# Patient Record
Sex: FEMALE | Race: WHITE | NOT HISPANIC OR LATINO | Employment: OTHER | URBAN - METROPOLITAN AREA
[De-identification: names, ages, dates, MRNs, and addresses within clinical notes are randomized per-mention and may not be internally consistent; named-entity substitution may affect disease eponyms.]

---

## 2017-01-03 ENCOUNTER — ALLSCRIPTS OFFICE VISIT (OUTPATIENT)
Dept: OTHER | Facility: OTHER | Age: 77
End: 2017-01-03

## 2017-06-16 ENCOUNTER — HOSPITAL ENCOUNTER (OUTPATIENT)
Dept: RADIOLOGY | Facility: HOSPITAL | Age: 77
Discharge: HOME | End: 2017-06-16
Payer: MEDICARE

## 2017-06-16 ENCOUNTER — TRANSCRIBE ORDERS (OUTPATIENT)
Dept: RADIOLOGY | Facility: HOSPITAL | Age: 77
End: 2017-06-16

## 2017-06-16 ENCOUNTER — ALLSCRIPTS OFFICE VISIT (OUTPATIENT)
Dept: OTHER | Facility: OTHER | Age: 77
End: 2017-06-16

## 2017-06-16 DIAGNOSIS — R10.32 LLQ ABDOMINAL PAIN: Primary | ICD-10-CM

## 2017-06-16 DIAGNOSIS — R10.32 LLQ ABDOMINAL PAIN: ICD-10-CM

## 2017-06-16 DIAGNOSIS — M54.10 RADICULOPATHY, UNSPECIFIED SPINAL REGION: ICD-10-CM

## 2017-06-16 PROCEDURE — 72110 X-RAY EXAM L-2 SPINE 4/>VWS: CPT

## 2017-06-16 PROCEDURE — 73521 X-RAY EXAM HIPS BI 2 VIEWS: CPT

## 2017-06-20 ENCOUNTER — GENERIC CONVERSION - ENCOUNTER (OUTPATIENT)
Dept: OTHER | Facility: OTHER | Age: 77
End: 2017-06-20

## 2017-06-20 ENCOUNTER — TRANSCRIBE ORDERS (OUTPATIENT)
Dept: ADMINISTRATIVE | Facility: HOSPITAL | Age: 77
End: 2017-06-20

## 2017-06-20 DIAGNOSIS — K80.20 CALCULUS OF GALLBLADDER WITHOUT CHOLECYSTITIS WITHOUT OBSTRUCTION: Primary | ICD-10-CM

## 2017-06-26 ENCOUNTER — APPOINTMENT (OUTPATIENT)
Dept: PHYSICAL THERAPY | Facility: REHABILITATION | Age: 77
End: 2017-06-26
Payer: MEDICARE

## 2017-06-26 PROCEDURE — G8978 MOBILITY CURRENT STATUS: HCPCS

## 2017-06-26 PROCEDURE — 97161 PT EVAL LOW COMPLEX 20 MIN: CPT

## 2017-06-26 PROCEDURE — G8979 MOBILITY GOAL STATUS: HCPCS

## 2017-06-27 ENCOUNTER — GENERIC CONVERSION - ENCOUNTER (OUTPATIENT)
Dept: OTHER | Facility: OTHER | Age: 77
End: 2017-06-27

## 2017-06-28 ENCOUNTER — HOSPITAL ENCOUNTER (OUTPATIENT)
Dept: RADIOLOGY | Facility: HOSPITAL | Age: 77
Discharge: HOME | End: 2017-06-28
Payer: MEDICARE

## 2017-06-28 ENCOUNTER — APPOINTMENT (OUTPATIENT)
Dept: PHYSICAL THERAPY | Facility: REHABILITATION | Age: 77
End: 2017-06-28
Payer: MEDICARE

## 2017-06-28 DIAGNOSIS — K80.20 CALCULUS OF GALLBLADDER WITHOUT CHOLECYSTITIS WITHOUT OBSTRUCTION: ICD-10-CM

## 2017-06-28 PROCEDURE — 97140 MANUAL THERAPY 1/> REGIONS: CPT

## 2017-06-28 PROCEDURE — 97110 THERAPEUTIC EXERCISES: CPT

## 2017-06-28 PROCEDURE — 76705 ECHO EXAM OF ABDOMEN: CPT

## 2017-07-03 ENCOUNTER — GENERIC CONVERSION - ENCOUNTER (OUTPATIENT)
Dept: OTHER | Facility: OTHER | Age: 77
End: 2017-07-03

## 2017-07-05 ENCOUNTER — APPOINTMENT (OUTPATIENT)
Dept: PHYSICAL THERAPY | Facility: REHABILITATION | Age: 77
End: 2017-07-05
Payer: MEDICARE

## 2017-07-05 PROCEDURE — 97110 THERAPEUTIC EXERCISES: CPT

## 2017-07-05 PROCEDURE — 97140 MANUAL THERAPY 1/> REGIONS: CPT

## 2017-07-07 ENCOUNTER — APPOINTMENT (OUTPATIENT)
Dept: PHYSICAL THERAPY | Facility: REHABILITATION | Age: 77
End: 2017-07-07
Payer: MEDICARE

## 2017-07-07 PROCEDURE — 97140 MANUAL THERAPY 1/> REGIONS: CPT

## 2017-07-07 PROCEDURE — 97110 THERAPEUTIC EXERCISES: CPT

## 2017-07-11 ENCOUNTER — APPOINTMENT (OUTPATIENT)
Dept: PHYSICAL THERAPY | Facility: REHABILITATION | Age: 77
End: 2017-07-11
Payer: MEDICARE

## 2017-07-11 PROCEDURE — 97110 THERAPEUTIC EXERCISES: CPT

## 2017-07-11 PROCEDURE — 97140 MANUAL THERAPY 1/> REGIONS: CPT

## 2017-07-14 ENCOUNTER — APPOINTMENT (OUTPATIENT)
Dept: PHYSICAL THERAPY | Facility: REHABILITATION | Age: 77
End: 2017-07-14
Payer: MEDICARE

## 2017-07-14 PROCEDURE — 97110 THERAPEUTIC EXERCISES: CPT

## 2017-07-17 ENCOUNTER — APPOINTMENT (OUTPATIENT)
Dept: PHYSICAL THERAPY | Facility: REHABILITATION | Age: 77
End: 2017-07-17
Payer: MEDICARE

## 2017-07-17 PROCEDURE — 97110 THERAPEUTIC EXERCISES: CPT

## 2017-07-17 PROCEDURE — 97140 MANUAL THERAPY 1/> REGIONS: CPT

## 2017-07-18 ENCOUNTER — ALLSCRIPTS OFFICE VISIT (OUTPATIENT)
Dept: OTHER | Facility: OTHER | Age: 77
End: 2017-07-18

## 2017-07-19 ENCOUNTER — GENERIC CONVERSION - ENCOUNTER (OUTPATIENT)
Dept: OTHER | Facility: OTHER | Age: 77
End: 2017-07-19

## 2017-07-19 ENCOUNTER — APPOINTMENT (OUTPATIENT)
Dept: PHYSICAL THERAPY | Facility: REHABILITATION | Age: 77
End: 2017-07-19
Payer: MEDICARE

## 2017-07-19 ENCOUNTER — TRANSCRIBE ORDERS (OUTPATIENT)
Dept: ADMINISTRATIVE | Facility: HOSPITAL | Age: 77
End: 2017-07-19

## 2017-07-19 DIAGNOSIS — M16.12 PRIMARY OSTEOARTHRITIS OF LEFT HIP: Primary | ICD-10-CM

## 2017-08-03 ENCOUNTER — HOSPITAL ENCOUNTER (OUTPATIENT)
Dept: RADIOLOGY | Facility: HOSPITAL | Age: 77
Discharge: HOME | End: 2017-08-03
Attending: ORTHOPAEDIC SURGERY
Payer: MEDICARE

## 2017-08-03 DIAGNOSIS — M16.12 PRIMARY OSTEOARTHRITIS OF LEFT HIP: ICD-10-CM

## 2017-08-03 PROCEDURE — 77002 NEEDLE LOCALIZATION BY XRAY: CPT

## 2017-08-03 PROCEDURE — 20610 DRAIN/INJ JOINT/BURSA W/O US: CPT

## 2017-08-03 RX ORDER — BUPIVACAINE HYDROCHLORIDE 2.5 MG/ML
30 INJECTION, SOLUTION EPIDURAL; INFILTRATION; INTRACAUDAL
Status: COMPLETED | OUTPATIENT
Start: 2017-08-03 | End: 2017-08-03

## 2017-08-03 RX ORDER — METHYLPREDNISOLONE ACETATE 80 MG/ML
80 INJECTION, SUSPENSION INTRA-ARTICULAR; INTRALESIONAL; INTRAMUSCULAR; SOFT TISSUE
Status: COMPLETED | OUTPATIENT
Start: 2017-08-03 | End: 2017-08-03

## 2017-08-03 RX ORDER — LIDOCAINE HYDROCHLORIDE 10 MG/ML
20 INJECTION, SOLUTION INFILTRATION; PERINEURAL
Status: COMPLETED | OUTPATIENT
Start: 2017-08-03 | End: 2017-08-03

## 2017-08-03 RX ADMIN — BUPIVACAINE HYDROCHLORIDE 2 ML: 2.5 INJECTION, SOLUTION EPIDURAL; INFILTRATION; INTRACAUDAL at 13:35

## 2017-08-03 RX ADMIN — METHYLPREDNISOLONE ACETATE 80 MG: 80 INJECTION, SUSPENSION INTRA-ARTICULAR; INTRALESIONAL; INTRAMUSCULAR; SOFT TISSUE at 13:35

## 2017-08-03 RX ADMIN — IOHEXOL 3 ML: 300 INJECTION, SOLUTION INTRAVENOUS at 13:35

## 2017-08-03 RX ADMIN — LIDOCAINE HYDROCHLORIDE 2 ML: 10 INJECTION, SOLUTION INFILTRATION; PERINEURAL at 13:35

## 2017-10-17 ENCOUNTER — ALLSCRIPTS OFFICE VISIT (OUTPATIENT)
Dept: OTHER | Facility: OTHER | Age: 77
End: 2017-10-17

## 2017-10-17 ENCOUNTER — TRANSCRIBE ORDERS (OUTPATIENT)
Dept: ADMINISTRATIVE | Facility: HOSPITAL | Age: 77
End: 2017-10-17

## 2017-10-17 DIAGNOSIS — M16.12 OSTEOARTHRITIS OF LEFT HIP, UNSPECIFIED OSTEOARTHRITIS TYPE: Primary | ICD-10-CM

## 2017-10-18 NOTE — PROGRESS NOTES
Assessment  1  Lumbar radiculopathy (724 4) (M54 16)   2  Primary osteoarthritis of left hip (715 15) (M16 12)    Plan  Primary osteoarthritis of left hip    · * MRI LUMBAR SPINE WO CONTRAST; Status:Need Information - Financial  Authorization; Requested for:17Oct2017;     Discussion/Summary  Left hip DJD, who symptoms have abated with sterile injections as well as lumbar radiculopathy  Plan is as follows:    Weightbearing as tolerated  MRI of the lumbar/sacral region  We'll contact patient after MRI has been obtained  Discussed treatment plan with patient and they are in agreement with treatment plan  Thank you        History of Present Illness  HPI: 63-year-old female with left hip DJD, who presents for follow-up  Patient had sterile injections to the left hip, which she states greatly help alleviate his symptoms  A shin  States that this, time  Pain involving the anterior aspect of the quadriceps and the knee  She lays down flat  She has significant amount of pain, but when she sits in implant position, the symptoms resolved  Has any numbness, tingling, fevers, chills  Describes pain and also as well as some risk of fall secondary to this pain over the knee region/quadriceps region      Review of Systems    Constitutional: No fever, no chills, feels well, no tiredness, no recent weight gain or loss  Eyes: No complaints of eyesight problems, no red eyes  ENT: no loss of hearing, no nosebleeds, no sore throat  Cardiovascular: No complaints of chest pain, no palpitations, no leg claudication or lower extremity edema  Respiratory: no compliants of shortness of breath, no wheezing, no cough  Gastrointestinal: no complaints of abdominal pain, no constipation, no nausea or diarrhea, no vomiting, no bloody stools  Genitourinary: no complaints of dysuria, no incontinence  Musculoskeletal: as noted in HPI  Integumentary: no complaints of skin rash or lesion, no itching or dry skin, no skin wounds  Neurological: no complaints of headache, no confusion, no numbness or tingling, no dizziness  Endocrine: No complaints of muscle weakness, no feelings of weakness, no frequent urination, no excessive thirst    Psychiatric: No suicidal thoughts, no anxiety, no feelings of depression  Active Problems  1  Acute bronchitis with bronchospasm (466 0) (J20 9)   2  Acute sinusitis (461 9) (J01 90)   3  Cholelithiasis (574 20) (K80 20)   4  Decreased platelet count (620 4) (D69 6)   5  Diverticulosis (562 10) (K57 90)   6  Fatigue (780 79) (R53 83)   7  Fever (780 60) (R50 9)   8  Hematuria (599 70) (R31 9)   9  History of allergy (V15 09) (Z88 9)   10  History of sinusitis (V12 69) (Z87 09)   11  Hyperlipidemia (272 4) (E78 5)   12  Left groin pain (789 09) (R10 32)   13  Left hip pain (719 45) (M25 552)   14  Leukocytes in urine (791 7) (R82 99)   15  Lower back pain (724 2) (M54 5)   16  Lumbar radiculopathy (724 4) (M54 16)   17  Microscopic hematuria (599 72) (R31 29)   18  Primary osteoarthritis of left hip (715 15) (M16 12)   19  Sciatica (724 3) (M54 30)   20  Seborrheic keratosis (702 19) (L82 1)   21  Sinusitis (473 9) (J32 9)   22  Upper respiratory infection (465 9) (J06 9)   23  Urinary frequency (788 41) (R35 0)   24  UTI (urinary tract infection) (599 0) (N39 0)   25  Visit for screening mammogram (V76 12) (Z12 31)    Past Medical History   · History of Shingles (053 9) (B02 9)    The active problems and past medical history were reviewed and updated today  Surgical History   · History of Cataract Surgery   · History of Partial Colectomy - Sigmoid    The surgical history was reviewed and updated today         Family History  Mother    · Family history of Colon Cancer (V16 0)   · Family history of Diverticulitis   · Family history of Hyperlipidemia   · Family history of Lung Cancer (V16 1)  Father    · Family history of Lung Cancer (V16 1)   · Family history of Tuberculosis    The family history was reviewed and updated today  Social History   · Never A Smoker   · Never Drank Alcohol  The social history was reviewed and updated today  Current Meds   1  Claritin 10 MG Oral Tablet; Therapy: [id number] to Recorded    The medication list was reviewed and updated today  Allergies  1  Bactrim TABS   2  Zocor TABS    Vitals   Recorded: 67UNY1993 10:34AM   Heart Rate 72   Respiration 18   Systolic 008   Diastolic 71   Weight 323 lb    BMI Calculated 22 44   BSA Calculated 1 46     Physical Exam  Left hip: No abrasions, no open wounds, slightly positive Stinchfield test, no pain with logrolling or axial loading, positive straight leg raise test over the anterior distal thigh region, no mediolateral joint line tenderness of the knee, stable to coronal and sagittal plane stress of the knee, neurologically and vascular intact  Right hip:No abrasions, no open wounds, no tenderness to palpation, no pain with flexion, internal rotation, and abduction, negative Stinchfield test, no pain with logrolling or axial loading, good anterior tibialis, EHL, FHL function, sensation, intact to superficial and deep peroneal nerves, palpable dorsalis pedis pulse        Results/Data  I personally reviewed the films/images/results in the office today  My interpretation follows  X-ray Review Lumbosacral spine films show syndesmophyte formation on the left side of the L2-3/L3-L4 regions        Signatures   Electronically signed by : LES Verdugo ; Oct 17 2017 11:02AM EST                       (Author)

## 2017-10-25 ENCOUNTER — HOSPITAL ENCOUNTER (OUTPATIENT)
Dept: RADIOLOGY | Facility: HOSPITAL | Age: 77
Discharge: HOME | End: 2017-10-25
Attending: ORTHOPAEDIC SURGERY
Payer: MEDICARE

## 2017-10-25 DIAGNOSIS — M16.12 OSTEOARTHRITIS OF LEFT HIP, UNSPECIFIED OSTEOARTHRITIS TYPE: ICD-10-CM

## 2017-10-25 PROCEDURE — 72148 MRI LUMBAR SPINE W/O DYE: CPT

## 2018-01-11 NOTE — PROGRESS NOTES
Chief Complaint  Pt came in today to drop off a urine sample  Active Problems    1  Acute bronchitis with bronchospasm (466 0) (J20 9)   2  Acute sinusitis (461 9) (J01 90)   3  Decreased platelet count (095 1) (D69 6)   4  Diverticulosis (562 10) (K57 90)   5  Fatigue (780 79) (R53 83)   6  Hematuria (599 70) (R31 9)   7  History of allergy (V15 09) (Z88 9)   8  History of sinusitis (V12 69) (Z87 09)   9  Hyperlipidemia (272 4) (E78 5)   10  Leukocytes in urine (791 7) (R82 99)   11  Lower back pain (724 2) (M54 5)   12  Lumbar radiculopathy (724 4) (M54 16)   13  Microscopic hematuria (599 72) (R31 2)   14  Sciatica (724 3) (M54 30)   15  Seborrheic keratosis (702 19) (L82 1)   16  Upper respiratory infection (465 9) (J06 9)   17  UTI (urinary tract infection) (599 0) (N39 0)   18  Visit for screening mammogram (V76 12) (Z12 31)    Current Meds   1  Claritin 10 MG Oral Tablet; Therapy: [id number] to Recorded   2  Ipratropium Bromide 0 06 % Nasal Solution; USE 2 SPRAYS IN EACH NOSTRIL 3   TIMES DAILY; Therapy: 12FRB7004 to ([id number]); Last Rx:[id number] Ordered   3  Nitrofurantoin Monohyd Macro 100 MG Oral Capsule; TAKE 1 CAPSULE TWICE DAILY   UNTIL GONE;   Therapy: [id number] to (Evaluate:05Jun2016)  Requested for: 33DJZ8186; Last   Rx:[id number] Ordered    Allergies    1  Bactrim TABS   2  Zocor TABS    Plan   Urine Dip Non-Automated- POC; Status:Resulted - Requires Verification;   Done: 54KZP7133 12:00AM  Due:14Jun2017; Last Updated Shreyas Mcfarlane; 6/14/2016 8:23:26 PM;Ordered;    Yosef Banana; Ordered By:Andrew Gooden;      Discussion/Summary    No microscopic blood noted in urine        Signatures   Electronically signed by : Kaushik Rush MD; Ed 15 2016  8:19AM EST                       (Author)

## 2018-01-12 NOTE — RESULT NOTES
Verified Results  US ABDOMEN LIMITED [id number] 08:12AM Savita Herr     Test Name Result Flag Reference   US ABDOMEN LIMITED (Report)     RIGHT UPPER QUADRANT ULTRASOUND     INDICATION: Cholelithiasis detected on recent lumbar spine x-rays  COMPARISON: Lumbar spine x-ray 6/16/2017, CT study 2009     TECHNIQUE:  Real-time ultrasound of the right upper quadrant was performed with a curvilinear transducer with both volumetric sweeps and still imaging techniques  FINDINGS:     PANCREAS: Visualized portions of the pancreas are within normal limits  AORTA AND IVC: Visualized portions are normal for patient age  LIVER:   Size: Within normal range  The liver measures 13 3 cm in the midclavicular line  Contour: Surface contour is smooth  Parenchyma: Echogenicity and echotexture are within normal limits  There is a hyperechoic nodule within the hepatic parenchyma in the upper portion of the subcapsular right hepatic lobe  Limited imaging of the main portal vein shows it to be patent and hepatopetal       BILIARY:   The gallbladder is normal in caliber  No wall thickening or pericholecystic fluid  Multiple gallstones are present   No sonographic Cabello's sign  No intrahepatic biliary dilatation  CBD measures 4 mm  No choledocholithiasis  KIDNEY:    Right kidney measures 10 7 x 4 3 cm  Within normal limits  ASCITES:  None  IMPRESSION:       1  Multiple gallstones, without gallbladder wall thickening or biliary ductal dilatation  2  The liver is unremarkable with the exception of a hyperechoic nodule in the right hepatic lobe measuring 7 mm in diameter  This is almost certainly benign in a patient without a history of primary malignancy, frequently representing a small    hemangioma or lipoma   There is a similar sized nodule in similar location on a CT study as far back as 2007 again suggesting benign etiology       Workstation performed: [id number]     Signed by:   Leonela Burton Dee Wood MD   7/2/17

## 2018-01-13 VITALS
DIASTOLIC BLOOD PRESSURE: 70 MMHG | TEMPERATURE: 100.4 F | WEIGHT: 119 LBS | SYSTOLIC BLOOD PRESSURE: 132 MMHG | OXYGEN SATURATION: 98 % | BODY MASS INDEX: 23.36 KG/M2 | HEART RATE: 90 BPM | HEIGHT: 60 IN

## 2018-01-13 NOTE — RESULT NOTES
Verified Results  * XR SPINE LUMBAR MINIMUM 4 VIEWS NON INJURY [id number] 01:30PM Savita Herr     Test Name Result Flag Reference   XR SPINE LUMBAR MINIMUM 4 VIEWS (Report)     LUMBAR SPINE     INDICATION: 2 weeks of left groin pain  COMPARISON: 2/3/2013     VIEWS: AP, lateral, bilateral oblique and coned down projections     IMAGES: 5     FINDINGS:     Stable alignment  No spondylolisthesis or spondylolysis  Mild dextroscoliosis of the lower lumbar spine  There is no radiographic evidence of acute fracture or destructive osseous lesion  Mild facet arthropathy within the lower lumbar spine  At L2-3 there is loss of disc height with anterior and lateral endplate osteophyte formation slightly more pronounced than the prior exam      There are numerous tiny peripherally calcified gallstones present in the right upper quadrant  Multiple phleboliths within the pelvis  IMPRESSION:     Slight progression of lumbar degenerative change most pronounced at L2-3 with worsening loss of disc height and endplate osteophyte formation  Stable mild facet arthropathy within the lower lumbar spine  Cholelithiasis  Workstation performed: [id number]     Signed by:   Braydon Kunz DO   6/20/17     * XR HIPS BILATERAL 2 VW W PELVIS IF PERFORMED [id number] 01:30PM Savita Herr     Test Name Result Flag Reference   XR HIPS BILATERAL WITH AP PELVIS (Report)     BILATERAL HIPS AND PELVIS     INDICATION: Left groin pain for 2 weeks  COMPARISON: None     VIEWS: AP pelvis and coned down views of each hip     IMAGES: 5      FINDINGS:     No acute pelvic fracture or pathologic bone lesions  Visualized bony pelvis appears intact  LEFT HIP:   Moderate degenerative osteoarthritis of the left hip  Bony alignment is maintained  Soft tissues are unremarkable  RIGHT HIP:   Mild degenerative osteoarthritis of the right hip  Bony alignment is maintained  Soft tissues are unremarkable  IMPRESSION:     Degenerative osteoarthritis of the hips, left greater than right         Workstation performed: [id number]     Signed by:   Diann Kingsley DO   6/20/17

## 2018-01-14 VITALS
BODY MASS INDEX: 22.09 KG/M2 | HEIGHT: 60 IN | TEMPERATURE: 99 F | SYSTOLIC BLOOD PRESSURE: 112 MMHG | WEIGHT: 112.5 LBS | HEART RATE: 64 BPM | DIASTOLIC BLOOD PRESSURE: 72 MMHG | RESPIRATION RATE: 16 BRPM

## 2018-01-14 VITALS
DIASTOLIC BLOOD PRESSURE: 81 MMHG | HEART RATE: 65 BPM | WEIGHT: 111.25 LBS | SYSTOLIC BLOOD PRESSURE: 133 MMHG | HEIGHT: 60 IN | BODY MASS INDEX: 21.84 KG/M2 | RESPIRATION RATE: 18 BRPM

## 2018-01-14 VITALS
RESPIRATION RATE: 18 BRPM | DIASTOLIC BLOOD PRESSURE: 71 MMHG | WEIGHT: 113 LBS | HEART RATE: 72 BPM | SYSTOLIC BLOOD PRESSURE: 147 MMHG | BODY MASS INDEX: 22.44 KG/M2

## 2018-01-14 NOTE — RESULT NOTES
Verified Results  (1) URINALYSIS w URINE C/S REFLEX (will reflex a microscopy if leukocytes, occult blood, or nitrites are not within normal limits) [id number] 08:45PM Savita Herr     Test Name Result Flag Reference   COLOR Dk Yellow     CLARITY Turbid     PH UA 5 0  4 5-8 0   LEUKOCYTE ESTERASE UA Large A Negative   NITRITE UA Negative  Negative   PROTEIN  (2+) mg/dl A Negative   GLUCOSE UA Negative mg/dl  Negative   KETONES UA Negative mg/dl  Negative   UROBILINOGEN UA 0 2 E U /dl  0 2, 1 0 E U /dl   BILIRUBIN UA  A Negative   Interference- unable to analyze   The dipstick result may be falsely positive do to interfering substances  We recommend reliance upon serum bilirubin, liver & kidney function tests to guide patient care if clinically indicated     BLOOD UA Large A Negative   SPECIFIC GRAVITY UA 1 023  1 003-1 030     (1) URINALYSIS w URINE C/S REFLEX (will reflex a microscopy if leukocytes, occult blood, or nitrites are not within normal limits) [id number] 08:45PM Nemesio Savita     Test Name Result Flag Reference   AMORPHOUS URATES Innumerable /hpf     BACTERIA Occasional /hpf  None Seen, Occasional   EPITHELIAL CELLS None Seen /hpf  None Seen, Occasional   RBC UA 10-20 /hpf A None Seen   WBC UA Innumerable /hpf A None Seen

## 2018-01-18 NOTE — RESULT NOTES
Verified Results  (1) URINALYSIS w URINE C/S REFLEX (will reflex a microscopy if leukocytes, occult blood, or nitrites are not within normal limits) [id number] 08:45PM Savita Herr     Test Name Result Flag Reference   CLINICAL REPORT (Report)     Test:        Urine culture  Specimen Type:   Urine  Specimen Date:   5/31/2016 8:45 PM  Result Date:    6/3/2016 10:27 AM  Result Status:   Final result  Resulting Lab:   [facility] [place] [address]            Tel: [phone]      CULTURE                                       ------------------                                   40,000-49,000 cfu/ml Klebsiella pneumoniae      SUSCEPTIBILITY                                   ------------------                                                       Klebsiella pneumoniae  METHOD                 LORENA  -------------------------------------  --------------------------  AMPICILLIN ($$)             >16 00 ug/ml Resistant  AMPICILLIN + SULBACTAM ($)       <=8/4 ug/ml  Susceptible  AZTREONAM ($$$)             <=8 ug/ml   Susceptible  CEFAZOLIN ($)              <=8 00 ug/ml Susceptible  CIPROFLOXACIN ($)            <=1 00 ug/ml Susceptible  GENTAMICIN ($$)             <=4 ug/ml   Susceptible  LEVOFLOXACIN ($)            <=2 00 ug/ml Susceptible  NITROFURANTOIN             64 ug/ml   Intermediate  PIPERACILLIN + TAZOBACTAM ($$$)     <=16 ug/ml  Susceptible  TETRACYCLINE              >8 ug/ml   Resistant  TOBRAMYCIN ($)             <=4 ug/ml   Susceptible  TRIMETHOPRIM + SULFAMETHOXAZOLE ($$$)  <=2/38 ug/ml Susceptible

## 2019-03-26 ENCOUNTER — OFFICE VISIT (OUTPATIENT)
Dept: FAMILY MEDICINE CLINIC | Facility: CLINIC | Age: 79
End: 2019-03-26
Payer: MEDICARE

## 2019-03-26 VITALS
HEIGHT: 60 IN | HEART RATE: 80 BPM | BODY MASS INDEX: 21.28 KG/M2 | DIASTOLIC BLOOD PRESSURE: 82 MMHG | RESPIRATION RATE: 16 BRPM | WEIGHT: 108.4 LBS | TEMPERATURE: 98.4 F | SYSTOLIC BLOOD PRESSURE: 140 MMHG

## 2019-03-26 DIAGNOSIS — E78.00 ELEVATED CHOLESTEROL: ICD-10-CM

## 2019-03-26 DIAGNOSIS — R20.0 NUMBNESS: ICD-10-CM

## 2019-03-26 DIAGNOSIS — J32.9 SINUSITIS, UNSPECIFIED CHRONICITY, UNSPECIFIED LOCATION: Primary | ICD-10-CM

## 2019-03-26 DIAGNOSIS — R10.32 LEFT GROIN PAIN: ICD-10-CM

## 2019-03-26 DIAGNOSIS — M81.0 OSTEOPOROSIS, UNSPECIFIED OSTEOPOROSIS TYPE, UNSPECIFIED PATHOLOGICAL FRACTURE PRESENCE: ICD-10-CM

## 2019-03-26 DIAGNOSIS — R53.83 FATIGUE, UNSPECIFIED TYPE: ICD-10-CM

## 2019-03-26 PROCEDURE — 99214 OFFICE O/P EST MOD 30 MIN: CPT | Performed by: FAMILY MEDICINE

## 2019-03-26 RX ORDER — AMOXICILLIN 500 MG/1
500 CAPSULE ORAL EVERY 12 HOURS SCHEDULED
Qty: 14 CAPSULE | Refills: 0 | Status: SHIPPED | OUTPATIENT
Start: 2019-03-26 | End: 2019-04-02

## 2019-03-26 RX ORDER — LORATADINE 10 MG/1
TABLET ORAL
COMMUNITY
Start: 2014-10-08

## 2019-03-26 NOTE — PROGRESS NOTES
FAMILY PRACTICE OFFICE VISIT       NAME: Alexandro Jasso  AGE: 66 y o  SEX: female       : 1940        MRN: [id number]    DATE: 3/28/2019  TIME: 8:09 PM    Assessment and Plan     Problem List Items Addressed This Visit        Respiratory    Sinusitis - Primary    Relevant Medications    amoxicillin (AMOXIL) 500 mg capsule       Musculoskeletal and Integument    Osteoporosis    Relevant Orders    DXA bone density spine hip and pelvis       Other    Left groin pain    Relevant Orders    Ambulatory referral to Orthopedic Surgery    Numbness    Relevant Orders    TSH, 3rd generation with Free T4 reflex    Vitamin B12    Ambulatory referral to Orthopedic Surgery    Elevated cholesterol    Relevant Orders    Comprehensive metabolic panel    Lipid Panel with Direct LDL reflex      Other Visit Diagnoses     Fatigue, unspecified type        Relevant Orders    CBC and differential    Comprehensive metabolic panel    TSH, 3rd generation with Free T4 reflex        Sinusitis:  Patient presents with symptoms that appear consistent with sinusitis  Has been exposed to sick contacts  Will start patient on amoxicillin  Recommend nasal saline rinses  Continue with symptomatic treatment and supportive measures including adequate hydration  Return parameters discussed  History of left groin pain and right heel numbness:  Patient does not wish to have any further workup  She would like to 1st be evaluated Dr Pritchard  Referral provided  Denies bowel or bladder incontinence  History of elevated cholesterol: Will check lipid panel  Continue lifestyle modifications  History of osteoporosis:  Will check DEXA scan  Recommend muscle strengthening, weight-bearing exercises as well as calcium and vitamin-D supplementation  There are no Patient Instructions on file for this visit          Chief Complaint     Chief Complaint   Patient presents with    Cold Like Symptoms     Pt is here for headaches, sinus pressure, chills 5 + days       History of Present Illness     HPI  70-year-old female presents with a 5 day h/o sinus pressure, nasal congestion, chills, nasal drainage     has taken sudafed pe for 3 days, last taken this am at 6 30    has been sick   drinking enough water      Right heel numbness, notices it when she takes a bath  Would like to see dr Michelle Ramires first   does not want any testing at present    Review of Systems   Review of Systems   Constitutional: Positive for chills  HENT: Positive for congestion, rhinorrhea and sinus pressure  Musculoskeletal:        Right heel numbness   Neurological: Positive for headaches  Active Problem List     Patient Active Problem List   Diagnosis    Sinusitis    Left groin pain    Numbness    Elevated cholesterol    Osteoporosis       Past Medical History:  History reviewed  No pertinent past medical history  Past Surgical History:  History reviewed  No pertinent surgical history  Family History:  History reviewed  No pertinent family history      Social History:  Social History     Socioeconomic History    Marital status: /Civil Union     Spouse name: Not on file    Number of children: Not on file    Years of education: Not on file    Highest education level: Not on file   Occupational History    Not on file   Social Needs    Financial resource strain: Not on file    Food insecurity:     Worry: Not on file     Inability: Not on file    Transportation needs:     Medical: Not on file     Non-medical: Not on file   Tobacco Use    Smoking status: Never Smoker    Smokeless tobacco: Never Used   Substance and Sexual Activity    Alcohol use: Yes     Comment: occasional    Drug use: Never    Sexual activity: Not on file   Lifestyle    Physical activity:     Days per week: Not on file     Minutes per session: Not on file    Stress: Not on file   Relationships    Social connections:     Talks on phone: Not on file     Gets together: Not on file     Attends Gnosticism service: Not on file     Active member of club or organization: Not on file     Attends meetings of clubs or organizations: Not on file     Relationship status: Not on file    Intimate partner violence:     Fear of current or ex partner: Not on file     Emotionally abused: Not on file     Physically abused: Not on file     Forced sexual activity: Not on file   Other Topics Concern    Not on file   Social History Narrative    Not on file     I have reviewed the patient's medical history in detail; there are no changes to the history as noted in the electronic medical record  Objective     Vitals:    03/26/19 1506   BP: 140/82   Pulse:    Resp:    Temp:      Wt Readings from Last 3 Encounters:   03/26/19 49 2 kg (108 lb 6 4 oz)   10/25/17 51 3 kg (113 lb)   10/17/17 51 3 kg (113 lb)     Vitals:    03/26/19 1414 03/26/19 1506   BP: 162/90 140/82   Pulse: 80    Resp: 16    Temp: 98 4 °F (36 9 °C)    TempSrc: Tympanic    Weight: 49 2 kg (108 lb 6 4 oz)    Height: 5' (1 524 m)          Physical Exam   Constitutional: She appears well-developed and well-nourished  HENT:   Head: Normocephalic and atraumatic  Mouth/Throat: Oropharynx is clear and moist    Mild left ear effusion  TMs intact and clear  Nares with edema noted  Posterior oropharynx with drainage erythema noted  Tender palpation of maxillary, ethmoid and frontal sinuses bilaterally  Eyes: Pupils are equal, round, and reactive to light  Conjunctivae and EOM are normal    Neck: Normal range of motion  Neck supple  No thyromegaly present  Cardiovascular: Normal rate, regular rhythm and normal heart sounds  Pulmonary/Chest: Effort normal and breath sounds normal  She has no wheezes  Musculoskeletal: Normal range of motion  Lymphadenopathy:     She has no cervical adenopathy  Nursing note and vitals reviewed        Pertinent Laboratory/Diagnostic Studies:  Lab Results   Component Value Date    GLUCOSE 98 10/09/2014 BUN 17 10/09/2014    CREATININE 0 58 (L) 10/09/2014    CALCIUM 9 9 10/09/2014     10/09/2014    K 4 0 10/09/2014    CO2 29 10/09/2014     10/09/2014     Lab Results   Component Value Date    ALT 31 10/09/2014    AST 26 10/09/2014    ALKPHOS 106 10/09/2014    BILITOT 0 80 10/09/2014       Lab Results   Component Value Date    WBC 5 53 10/09/2014    HGB 14 6 10/09/2014    HCT 44 0 10/09/2014    MCV 92 10/09/2014     (L) 10/09/2014       No results found for: TSH    Lab Results   Component Value Date    CHOL 264 10/09/2014     Lab Results   Component Value Date    TRIG 183 10/09/2014     Lab Results   Component Value Date    HDL 55 10/09/2014     Lab Results   Component Value Date    LDLCALC 172 (H) 10/09/2014     No results found for: HGBA1C    Results for orders placed or performed in visit on 12/03/16   Urine culture   Result Value Ref Range    Urine Culture >100,000 cfu/ml Escherichia coli        Susceptibility    Escherichia coli - LORENA     Ampicillin ($$) <=8 00 Susceptible ug/ml     Aztreonam ($$$)  <=8 Susceptible ug/ml     Cefazolin ($) <=8 00 Susceptible ug/ml     Ciprofloxacin ($)  <=1 00 Susceptible ug/ml     Gentamicin ($$) <=4 Susceptible ug/ml     Levofloxacin ($) <=2 00 Susceptible ug/ml     Nitrofurantoin <=32 Susceptible ug/ml     Piperacillin + Tazobactam ($$$) <=16 Susceptible ug/ml     Tetracycline <=4 Susceptible ug/ml     Tobramycin ($) <=4 Susceptible ug/ml     Trimethoprim + Sulfamethoxazole ($$$) <=2/38 Susceptible ug/ml   Urinalysis with culture and sensitivity reflex   Result Value Ref Range    Color, UA Yellow     Clarity, UA Cloudy     Specific [place], UA 1 011 1 003 - 1 030    pH, UA 6 0 4 5 - 8 0    Leukocytes, UA Large (A) Negative    Nitrite, UA Negative Negative    Protein, UA 30 (1+) (A) Negative mg/dl    Glucose, UA Negative Negative mg/dl    Ketones, UA Negative Negative mg/dl    Urobilinogen, UA 0 2 0 2, 1 0 E U /dl E U /dl    Bilirubin, UA Negative Negative Blood, UA Large (A) Negative   Urine Microscopic   Result Value Ref Range    RBC, UA 30-50 (A) None Seen /hpf    WBC, UA Innumerable (A) None Seen /hpf    Epithelial Cells None Seen None Seen, Occasional /hpf    Bacteria, UA Occasional None Seen, Occasional /hpf       Orders Placed This Encounter   Procedures    DXA bone density spine hip and pelvis    CBC and differential    Comprehensive metabolic panel    Lipid Panel with Direct LDL reflex    TSH, 3rd generation with Free T4 reflex    Vitamin B12    Ambulatory referral to Orthopedic Surgery       ALLERGIES:  Allergies   Allergen Reactions    Bactrim [Sulfamethoxazole-Trimethoprim]     Simvastatin Myalgia       Current Medications     Current Outpatient Medications   Medication Sig Dispense Refill    loratadine (CLARITIN) 10 mg tablet Take by mouth      amoxicillin (AMOXIL) 500 mg capsule Take 1 capsule (500 mg total) by mouth every 12 (twelve) hours for 7 days 14 capsule 0     No current facility-administered medications for this visit            Health Maintenance     Health Maintenance   Topic Date Due    Depression Screening PHQ  1940    Medicare Annual Wellness Visit (AWV)  1940    DTaP,Tdap,and Td Vaccines (1 - Tdap) 09/22/1961    Fall Risk  09/22/2005    Urinary Incontinence Screening  09/22/2005    Pneumococcal PPSV23/PCV13 65+ Years / High and Highest Risk (2 of 2 - PCV13) 10/10/2009    BMI: Adult  03/26/2020    INFLUENZA VACCINE  Completed    HEPATITIS B VACCINES  Aged Out     Immunization History   Administered Date(s) Administered    INFLUENZA 09/27/2013, 11/11/2014, 10/28/2015, 10/05/2016, 10/23/2017, 10/17/2018    Influenza TIV (IM) 09/01/2009    Pneumococcal Polysaccharide PPV23 10/10/2008       Goyo Saha MD

## 2019-03-28 PROBLEM — J32.9 SINUSITIS: Status: ACTIVE | Noted: 2019-03-28

## 2019-03-28 PROBLEM — R10.32 LEFT GROIN PAIN: Status: ACTIVE | Noted: 2019-03-28

## 2019-03-28 PROBLEM — E78.00 ELEVATED CHOLESTEROL: Status: ACTIVE | Noted: 2019-03-28

## 2019-03-28 PROBLEM — M81.0 OSTEOPOROSIS: Status: ACTIVE | Noted: 2019-03-28

## 2019-03-28 PROBLEM — R20.0 NUMBNESS: Status: ACTIVE | Noted: 2019-03-28

## 2019-04-20 ENCOUNTER — TRANSCRIBE ORDERS (OUTPATIENT)
Dept: LAB | Facility: CLINIC | Age: 79
End: 2019-04-20

## 2019-04-20 ENCOUNTER — LAB (OUTPATIENT)
Dept: LAB | Facility: CLINIC | Age: 79
End: 2019-04-20
Payer: MEDICARE

## 2019-04-20 DIAGNOSIS — R53.83 FATIGUE, UNSPECIFIED TYPE: ICD-10-CM

## 2019-04-20 DIAGNOSIS — R20.0 NUMBNESS: ICD-10-CM

## 2019-04-20 DIAGNOSIS — E78.00 ELEVATED CHOLESTEROL: ICD-10-CM

## 2019-04-20 LAB
ALBUMIN SERPL BCP-MCNC: 3.9 G/DL (ref 3.5–5)
ALP SERPL-CCNC: 89 U/L (ref 46–116)
ALT SERPL W P-5'-P-CCNC: 18 U/L (ref 12–78)
ANION GAP SERPL CALCULATED.3IONS-SCNC: 5 MMOL/L (ref 4–13)
AST SERPL W P-5'-P-CCNC: 16 U/L (ref 5–45)
BASOPHILS # BLD AUTO: 0.07 THOUSANDS/ΜL (ref 0–0.1)
BASOPHILS NFR BLD AUTO: 1 % (ref 0–1)
BILIRUB SERPL-MCNC: 0.68 MG/DL (ref 0.2–1)
BUN SERPL-MCNC: 17 MG/DL (ref 5–25)
CALCIUM SERPL-MCNC: 9.1 MG/DL (ref 8.3–10.1)
CHLORIDE SERPL-SCNC: 109 MMOL/L (ref 100–108)
CHOLEST SERPL-MCNC: 249 MG/DL (ref 50–200)
CO2 SERPL-SCNC: 28 MMOL/L (ref 21–32)
CREAT SERPL-MCNC: 0.59 MG/DL (ref 0.6–1.3)
EOSINOPHIL # BLD AUTO: 0.23 THOUSAND/ΜL (ref 0–0.61)
EOSINOPHIL NFR BLD AUTO: 4 % (ref 0–6)
ERYTHROCYTE [DISTWIDTH] IN BLOOD BY AUTOMATED COUNT: 13 % (ref 11.6–15.1)
GFR SERPL CREATININE-BSD FRML MDRD: 88 ML/MIN/1.73SQ M
GLUCOSE P FAST SERPL-MCNC: 84 MG/DL (ref 65–99)
HCT VFR BLD AUTO: 44.8 % (ref 34.8–46.1)
HDLC SERPL-MCNC: 54 MG/DL (ref 40–60)
HGB BLD-MCNC: 14.5 G/DL (ref 11.5–15.4)
IMM GRANULOCYTES # BLD AUTO: 0.03 THOUSAND/UL (ref 0–0.2)
IMM GRANULOCYTES NFR BLD AUTO: 1 % (ref 0–2)
LDLC SERPL CALC-MCNC: 170 MG/DL (ref 0–100)
LYMPHOCYTES # BLD AUTO: 0.95 THOUSANDS/ΜL (ref 0.6–4.47)
LYMPHOCYTES NFR BLD AUTO: 16 % (ref 14–44)
MCH RBC QN AUTO: 30.2 PG (ref 26.8–34.3)
MCHC RBC AUTO-ENTMCNC: 32.4 G/DL (ref 31.4–37.4)
MCV RBC AUTO: 93 FL (ref 82–98)
MONOCYTES # BLD AUTO: 0.74 THOUSAND/ΜL (ref 0.17–1.22)
MONOCYTES NFR BLD AUTO: 13 % (ref 4–12)
NEUTROPHILS # BLD AUTO: 3.87 THOUSANDS/ΜL (ref 1.85–7.62)
NEUTS SEG NFR BLD AUTO: 65 % (ref 43–75)
NRBC BLD AUTO-RTO: 0 /100 WBCS
PLATELET # BLD AUTO: 147 THOUSANDS/UL (ref 149–390)
PMV BLD AUTO: 10.4 FL (ref 8.9–12.7)
POTASSIUM SERPL-SCNC: 3.7 MMOL/L (ref 3.5–5.3)
PROT SERPL-MCNC: 6.7 G/DL (ref 6.4–8.2)
RBC # BLD AUTO: 4.8 MILLION/UL (ref 3.81–5.12)
SODIUM SERPL-SCNC: 142 MMOL/L (ref 136–145)
T4 FREE SERPL-MCNC: 0.81 NG/DL (ref 0.76–1.46)
TRIGL SERPL-MCNC: 124 MG/DL
TSH SERPL DL<=0.05 MIU/L-ACNC: 4.81 UIU/ML (ref 0.36–3.74)
VIT B12 SERPL-MCNC: 522 PG/ML (ref 100–900)
WBC # BLD AUTO: 5.89 THOUSAND/UL (ref 4.31–10.16)

## 2019-04-20 PROCEDURE — 85025 COMPLETE CBC W/AUTO DIFF WBC: CPT

## 2019-04-20 PROCEDURE — 82607 VITAMIN B-12: CPT

## 2019-04-20 PROCEDURE — 84439 ASSAY OF FREE THYROXINE: CPT

## 2019-04-20 PROCEDURE — 80053 COMPREHEN METABOLIC PANEL: CPT

## 2019-04-20 PROCEDURE — 36415 COLL VENOUS BLD VENIPUNCTURE: CPT

## 2019-04-20 PROCEDURE — 84443 ASSAY THYROID STIM HORMONE: CPT

## 2019-04-20 PROCEDURE — 80061 LIPID PANEL: CPT

## 2019-04-21 DIAGNOSIS — E78.00 ELEVATED CHOLESTEROL: Primary | ICD-10-CM

## 2019-04-21 DIAGNOSIS — R79.89 ELEVATED TSH: ICD-10-CM

## 2019-04-22 ENCOUNTER — TELEPHONE (OUTPATIENT)
Dept: FAMILY MEDICINE CLINIC | Facility: CLINIC | Age: 79
End: 2019-04-22

## 2019-04-30 ENCOUNTER — CONSULT (OUTPATIENT)
Dept: OBGYN CLINIC | Facility: HOSPITAL | Age: 79
End: 2019-04-30
Payer: MEDICARE

## 2019-04-30 ENCOUNTER — HOSPITAL ENCOUNTER (OUTPATIENT)
Dept: RADIOLOGY | Facility: HOSPITAL | Age: 79
Discharge: HOME | End: 2019-04-30
Attending: ORTHOPAEDIC SURGERY
Payer: MEDICARE

## 2019-04-30 VITALS
BODY MASS INDEX: 21.01 KG/M2 | DIASTOLIC BLOOD PRESSURE: 80 MMHG | SYSTOLIC BLOOD PRESSURE: 143 MMHG | WEIGHT: 107 LBS | HEART RATE: 69 BPM | HEIGHT: 60 IN

## 2019-04-30 DIAGNOSIS — M16.12 PRIMARY OSTEOARTHRITIS OF ONE HIP, LEFT: ICD-10-CM

## 2019-04-30 DIAGNOSIS — M25.552 LEFT HIP PAIN: ICD-10-CM

## 2019-04-30 DIAGNOSIS — R20.0 NUMBNESS: ICD-10-CM

## 2019-04-30 DIAGNOSIS — R10.32 LEFT GROIN PAIN: ICD-10-CM

## 2019-04-30 DIAGNOSIS — M16.12 PRIMARY OSTEOARTHRITIS OF ONE HIP, LEFT: Primary | ICD-10-CM

## 2019-04-30 DIAGNOSIS — M54.16 RADICULOPATHY, LUMBAR REGION: ICD-10-CM

## 2019-04-30 PROCEDURE — 73502 X-RAY EXAM HIP UNI 2-3 VIEWS: CPT

## 2019-04-30 PROCEDURE — 99214 OFFICE O/P EST MOD 30 MIN: CPT | Performed by: ORTHOPAEDIC SURGERY

## 2019-05-03 ENCOUNTER — HOSPITAL ENCOUNTER (OUTPATIENT)
Dept: RADIOLOGY | Facility: HOSPITAL | Age: 79
Discharge: HOME | End: 2019-05-03
Payer: MEDICARE

## 2019-05-03 DIAGNOSIS — R20.0 NUMBNESS: ICD-10-CM

## 2019-05-03 PROCEDURE — 72148 MRI LUMBAR SPINE W/O DYE: CPT

## 2019-05-14 ENCOUNTER — HOSPITAL ENCOUNTER (OUTPATIENT)
Dept: RADIOLOGY | Facility: HOSPITAL | Age: 79
Discharge: HOME | End: 2019-05-14
Attending: ORTHOPAEDIC SURGERY
Payer: MEDICARE

## 2019-05-14 ENCOUNTER — HOSPITAL ENCOUNTER (OUTPATIENT)
Dept: RADIOLOGY | Facility: HOSPITAL | Age: 79
Discharge: HOME | End: 2019-05-14
Payer: MEDICARE

## 2019-05-14 DIAGNOSIS — M16.12 PRIMARY OSTEOARTHRITIS OF ONE HIP, LEFT: ICD-10-CM

## 2019-05-14 DIAGNOSIS — R10.32 LEFT GROIN PAIN: ICD-10-CM

## 2019-05-14 PROCEDURE — 77002 NEEDLE LOCALIZATION BY XRAY: CPT

## 2019-05-14 PROCEDURE — 20610 DRAIN/INJ JOINT/BURSA W/O US: CPT

## 2019-05-14 RX ORDER — BUPIVACAINE HYDROCHLORIDE 2.5 MG/ML
10 INJECTION, SOLUTION EPIDURAL; INFILTRATION; INTRACAUDAL
Status: DISCONTINUED | OUTPATIENT
Start: 2019-05-14 | End: 2019-05-15 | Stop reason: HOSPADM

## 2019-05-14 RX ORDER — LIDOCAINE HYDROCHLORIDE 10 MG/ML
20 INJECTION, SOLUTION INFILTRATION; PERINEURAL
Status: DISCONTINUED | OUTPATIENT
Start: 2019-05-14 | End: 2019-05-15 | Stop reason: HOSPADM

## 2019-05-14 RX ORDER — METHYLPREDNISOLONE ACETATE 80 MG/ML
80 INJECTION, SUSPENSION INTRA-ARTICULAR; INTRALESIONAL; INTRAMUSCULAR; SOFT TISSUE
Status: DISCONTINUED | OUTPATIENT
Start: 2019-05-14 | End: 2019-05-15 | Stop reason: HOSPADM

## 2019-05-14 RX ADMIN — IOHEXOL 3 ML: 300 INJECTION, SOLUTION INTRAVENOUS at 14:00

## 2019-05-20 ENCOUNTER — CLINICAL SUPPORT (OUTPATIENT)
Dept: PAIN MEDICINE | Facility: CLINIC | Age: 79
End: 2019-05-20
Payer: MEDICARE

## 2019-05-20 VITALS
HEIGHT: 60 IN | HEART RATE: 72 BPM | SYSTOLIC BLOOD PRESSURE: 143 MMHG | BODY MASS INDEX: 20.22 KG/M2 | WEIGHT: 103 LBS | DIASTOLIC BLOOD PRESSURE: 69 MMHG | TEMPERATURE: 98.1 F

## 2019-05-20 DIAGNOSIS — M54.16 RADICULOPATHY, LUMBAR REGION: Primary | ICD-10-CM

## 2019-05-20 DIAGNOSIS — M51.26 LUMBAR DISC HERNIATION: ICD-10-CM

## 2019-05-20 DIAGNOSIS — M47.816 LUMBAR SPONDYLOSIS: ICD-10-CM

## 2019-05-20 PROCEDURE — 99204 OFFICE O/P NEW MOD 45 MIN: CPT | Performed by: ANESTHESIOLOGY

## 2019-06-12 ENCOUNTER — HOSPITAL ENCOUNTER (OUTPATIENT)
Dept: RADIOLOGY | Facility: CLINIC | Age: 79
Discharge: HOME | End: 2019-06-12
Attending: ANESTHESIOLOGY | Admitting: ANESTHESIOLOGY
Payer: MEDICARE

## 2019-06-12 VITALS
SYSTOLIC BLOOD PRESSURE: 136 MMHG | TEMPERATURE: 98 F | DIASTOLIC BLOOD PRESSURE: 76 MMHG | RESPIRATION RATE: 20 BRPM | OXYGEN SATURATION: 98 % | HEART RATE: 64 BPM

## 2019-06-12 DIAGNOSIS — M54.16 RADICULOPATHY, LUMBAR REGION: ICD-10-CM

## 2019-06-12 PROCEDURE — 64483 NJX AA&/STRD TFRM EPI L/S 1: CPT | Performed by: ANESTHESIOLOGY

## 2019-06-12 RX ORDER — LIDOCAINE HYDROCHLORIDE 10 MG/ML
5 INJECTION, SOLUTION EPIDURAL; INFILTRATION; INTRACAUDAL; PERINEURAL ONCE
Status: COMPLETED | OUTPATIENT
Start: 2019-06-12 | End: 2019-06-12

## 2019-06-12 RX ORDER — PAPAVERINE HCL 150 MG
10 CAPSULE, EXTENDED RELEASE ORAL ONCE
Status: COMPLETED | OUTPATIENT
Start: 2019-06-12 | End: 2019-06-12

## 2019-06-12 RX ADMIN — DEXAMETHASONE SODIUM PHOSPHATE 10 MG: 10 INJECTION, SOLUTION INTRAMUSCULAR; INTRAVENOUS at 09:47

## 2019-06-12 RX ADMIN — LIDOCAINE HYDROCHLORIDE 2 ML: 20 INJECTION, SOLUTION EPIDURAL; INFILTRATION; INTRACAUDAL; PERINEURAL at 09:47

## 2019-06-12 RX ADMIN — IOHEXOL 1 ML: 300 INJECTION, SOLUTION INTRAVENOUS at 09:44

## 2019-06-12 RX ADMIN — LIDOCAINE HYDROCHLORIDE 2 ML: 10 INJECTION, SOLUTION EPIDURAL; INFILTRATION; INTRACAUDAL; PERINEURAL at 09:42

## 2019-06-19 ENCOUNTER — TELEPHONE (OUTPATIENT)
Dept: PAIN MEDICINE | Facility: CLINIC | Age: 79
End: 2019-06-19

## 2019-07-10 ENCOUNTER — OFFICE VISIT (OUTPATIENT)
Dept: PAIN MEDICINE | Facility: CLINIC | Age: 79
End: 2019-07-10
Payer: MEDICARE

## 2019-07-10 VITALS
SYSTOLIC BLOOD PRESSURE: 134 MMHG | HEIGHT: 60 IN | WEIGHT: 104 LBS | HEART RATE: 65 BPM | DIASTOLIC BLOOD PRESSURE: 66 MMHG | BODY MASS INDEX: 20.42 KG/M2

## 2019-07-10 DIAGNOSIS — R20.0 NUMBNESS AND TINGLING OF BOTH FEET: ICD-10-CM

## 2019-07-10 DIAGNOSIS — R10.32 LEFT GROIN PAIN: Primary | ICD-10-CM

## 2019-07-10 DIAGNOSIS — R20.2 NUMBNESS AND TINGLING OF BOTH FEET: ICD-10-CM

## 2019-07-10 DIAGNOSIS — M25.552 LEFT HIP PAIN: ICD-10-CM

## 2019-07-10 PROCEDURE — 99213 OFFICE O/P EST LOW 20 MIN: CPT | Performed by: NURSE PRACTITIONER

## 2019-07-10 RX ORDER — IBUPROFEN 600 MG/1
600 TABLET ORAL EVERY 8 HOURS PRN
Qty: 90 TABLET | Refills: 1 | Status: SHIPPED | OUTPATIENT
Start: 2019-07-10 | End: 2020-12-07 | Stop reason: HOSPADM

## 2019-07-10 NOTE — PROGRESS NOTES
Assessment:  1  Left groin pain    2  Left hip pain    3  Numbness and tingling of both feet        Plan:  Patient presents to office today as a follow-up after receiving a left L3 TFESI with Dr Ricky Lindo on June 12, 2019  The patient reports that she is unsure if the epidural helped with her pain because she was not having much pain at the time of her injection because her left intra-articular hip injection was providing relief to her pain  She denies pain at this time  1   Patient will continue to follow with orthopedics  2  Patient will continue ibuprofen 600 mg q 8 hours p r n   3  Patient is not interested in physical therapy  4  I offered to schedule the patient for EMG of the BLE for further evaluation of the patient's foot symptoms, however she declines  She states the pain is not bothersome enough to treat with a daily medication such as gabapentin at this time  5   We can repeat left intra-articular hip injection as needed should she wish to have the procedure scheduled with our office  If she does not find relief from repeat, may then considering repeating left L3 TFESI  6  At this time, the patient wishes to follow up on an as-needed basis  The patient was advised to contact the office should their symptoms worsen in the interim  The patient was agreeable and verbalized an understanding  Other than as stated above, the patient denies any interval changes in medications, medical condition, mental condition, symptoms, or allergies since the last office visit  M*Modal software was used to dictate this note  It may contain errors with dictating incorrect words or incorrect spelling  Please contact the provider directly with any questions  History of Present Illness:     The patient is a 66 y o  female last seen on 5/20/19 who presents for a follow up office visit in regards to chronic left groin pain and numbness and tingling in both feet secondary to left hip osteoarthritis, lumbar degenerative disc disease, lumbar spondylosis and stenosis  The patient denies bowel or bladder incontinence or saddle anesthesia  At today's office visit, the patient denies pain  She is being seen by our practice for pain that radiates into her left groin along with numbness and tingling in her feet  She has had intra-articular hip injections x2 which she states have helped significantly reduced her left groin pain  She is also status post left L3 TFESI with Dr Joelle Lion on June 12, 2019  She is unable to tell me if the injection helped improve her pain because she states the pain was already negligible at the time of the epidural injection from having the hip injection a month prior  She continues to not have any significant groin or back pain at this time  She also complains of bilateral foot numbness and tingling, right greater than left  She denies any radicular symptoms elsewhere in the legs or significant back pain  MRI of the lumbar spine reveals degenerative disc disease and spondylosis with a disc herniation at L3-4 eccentric to the left approximating the left L3 nerve root  She does have a disc bulge at L2-3 and L4-5 without any significant central or foraminal stenosis  There is a disc bulge at L5-S1 causing some slight right foraminal stenosis  Current pain medications includes:  Ibuprofen p r n  And Tylenol p r n     I have personally reviewed and/or updated the patient's past medical history, past surgical history, family history, social history, current medications, allergies, and vital signs today  Review of Systems:    Review of Systems   Respiratory: Negative for shortness of breath  Cardiovascular: Negative for chest pain  Gastrointestinal: Negative for constipation, diarrhea, nausea and vomiting  Musculoskeletal: Negative for arthralgias, gait problem, joint swelling and myalgias  Skin: Negative for rash     Neurological: Negative for dizziness, seizures and weakness  All other systems reviewed and are negative  Past Medical History:   Diagnosis Date    High cholesterol        Past Surgical History:   Procedure Laterality Date    FL INJECTION LEFT HIP (NON ARTHROGRAM)  5/14/2019       No family history on file  Social History     Occupational History    Not on file   Tobacco Use    Smoking status: Never Smoker    Smokeless tobacco: Never Used   Substance and Sexual Activity    Alcohol use: Yes     Comment: occasional    Drug use: Never    Sexual activity: Not on file         Current Outpatient Medications:     ibuprofen (MOTRIN) 600 mg tablet, Take 1 tablet (600 mg total) by mouth every 8 (eight) hours as needed for mild pain, Disp: 90 tablet, Rfl: 1    loratadine (CLARITIN) 10 mg tablet, Take by mouth, Disp: , Rfl:     Allergies   Allergen Reactions    Bactrim [Sulfamethoxazole-Trimethoprim]     Simvastatin Myalgia       Physical Exam:    /66   Pulse 65   Ht 5' (1 524 m)   Wt 47 2 kg (104 lb)   BMI 20 31 kg/m²     Constitutional:normal, well developed, well nourished, alert, in no distress and non-toxic and no overt pain behavior  Eyes:anicteric  HEENT:grossly intact  Neck:supple, symmetric, trachea midline and no masses   Pulmonary:even and unlabored  Cardiovascular:No edema or pitting edema present  Skin:Normal without rashes or lesions and well hydrated  Psychiatric:Mood and affect appropriate  Neurologic:Cranial Nerves II-XII grossly intact  Musculoskeletal:normal gait  Negative seated straight leg raise bilaterally      Imaging  No orders to display   MRI LUMBAR SPINE WITHOUT CONTRAST     INDICATION: R20 0: Anesthesia of skin     Bilateral leg paresthesia with low back pain and new right foot numbness      COMPARISON:  MRI of the lumbar spine from October 25, 2017      TECHNIQUE:  Sagittal T1, sagittal T2, sagittal inversion recovery, axial T1 and axial T2, coronal T2        IMAGE QUALITY:  Diagnostic     FINDINGS:     VERTEBRAL BODIES:  Mild dextroscoliotic curvature to the lumbar spine with the apex at the L3 level  There is mild leftward lateral subluxation of L2 on L3  Minimal degenerative retrolisthesis of L3 on L4 is noted  Vertebral bodies are maintained in   stature  Small hemangiomas are identified within the T11 vertebral body  Degenerative type II endplate Modic signal is noted at L2-L3 with Schmorl's node deformities  SACRUM:  Normal signal within the sacrum  No evidence of insufficiency or stress   fracture      DISTAL CORD AND CONUS:  Normal size and signal within the distal cord and conus        PARASPINAL SOFT TISSUES:  Dorsal lower lumbar and upper sacral paraspinal muscular atrophy  Cystic changes within the left kidney      LOWER THORACIC DISC SPACES:  Normal disc height and signal   No disc herniation, canal stenosis or foraminal narrowing      LUMBAR DISC SPACES:  Multilevel disc desiccation with disc height loss most notably at L2-L3      L1-L2:  Normal      L2-L3:  Minimal retrolisthesis, endplate osteophytic ridging, circumferential disc bulge without spinal canal or significant foraminal stenosis      L3-L4:  Slight uncovering the posterior disc margin, mild circumferential disc bulge, mild bilateral facet arthropathy and inflammatory fluid within the facet joints  Left foraminal and far lateral protrusion in close proximity to the exited left L3   nerve root, correlate for ipsilateral radiculopathy  There is mild bilateral foraminal stenosis      L4-L5:  Mild circumferential disc bulge and bilateral facet arthropathy  Patent spinal canal   No significant foraminal stenosis      L5-S1:  Left greater than right facet arthropathy, mild circumferential disc bulge  No significant spinal stenosis    Mild right inferior foraminal encroachment      Miscellaneous: Redemonstrated gallstones      IMPRESSION:     Stable mild scoliosis and multilevel degenerative discogenic disease and osseous spondylosis as described above with no greater than mild spinal stenosis at L2-L3 and mild inferior foraminal stenosis with a left foraminal and far lateral protrusion at   L3-L4 and close proximity to the exited left L3 nerve root, correlate for ipsilateral radiculopathy  The findings are not significantly changed when compared to the prior study  LEFT HIP     INDICATION:   M16 12: Unilateral primary osteoarthritis, left hip      COMPARISON:  6/16/2017     VIEWS:  XR HIP/PELV 2-3 VWS LEFT  W PELVIS IF PERFORMED         FINDINGS:     There is no acute fracture or dislocation      Osteoarthritis of the hips is noted  No significant change compared with the prior study  There is osteophytosis at the joint margins and joint narrowing      No lytic or blastic osseous lesions      Soft tissues are unremarkable      The visualized lumbar spine is unremarkable      IMPRESSION:     Grossly stable appearance of osteoarthritis of the hips compared with 6/16/2017        No orders of the defined types were placed in this encounter

## 2020-10-24 ENCOUNTER — OFFICE VISIT (OUTPATIENT)
Dept: URGENT CARE | Age: 80
End: 2020-10-24
Payer: MEDICARE

## 2020-10-24 VITALS — SYSTOLIC BLOOD PRESSURE: 138 MMHG | DIASTOLIC BLOOD PRESSURE: 64 MMHG | HEART RATE: 85 BPM | TEMPERATURE: 98.3 F

## 2020-10-24 DIAGNOSIS — H10.11 ALLERGIC CONJUNCTIVITIS OF RIGHT EYE: Primary | ICD-10-CM

## 2020-10-24 PROCEDURE — 99203 OFFICE O/P NEW LOW 30 MIN: CPT | Performed by: PHYSICIAN ASSISTANT

## 2020-10-24 PROCEDURE — G0463 HOSPITAL OUTPT CLINIC VISIT: HCPCS | Performed by: PHYSICIAN ASSISTANT

## 2020-10-24 RX ORDER — KETOROLAC TROMETHAMINE 5 MG/ML
1 SOLUTION OPHTHALMIC 2 TIMES DAILY
Qty: 0.7 ML | Refills: 0 | Status: SHIPPED | OUTPATIENT
Start: 2020-10-24 | End: 2020-12-16 | Stop reason: HOSPADM

## 2020-12-01 ENCOUNTER — HOSPITAL ENCOUNTER (INPATIENT)
Facility: HOSPITAL | Age: 80
LOS: 6 days | Discharge: HOME HEALTH | DRG: 375 | End: 2020-12-07
Attending: EMERGENCY MEDICINE | Admitting: FAMILY MEDICINE
Payer: MEDICARE

## 2020-12-01 ENCOUNTER — APPOINTMENT (EMERGENCY)
Dept: RADIOLOGY | Facility: HOSPITAL | Age: 80
DRG: 375 | End: 2020-12-01
Payer: MEDICARE

## 2020-12-01 DIAGNOSIS — M81.0 OSTEOPOROSIS, UNSPECIFIED OSTEOPOROSIS TYPE, UNSPECIFIED PATHOLOGICAL FRACTURE PRESENCE: ICD-10-CM

## 2020-12-01 DIAGNOSIS — K31.89 MASS OF STOMACH: Primary | ICD-10-CM

## 2020-12-01 DIAGNOSIS — M54.16 RADICULOPATHY, LUMBAR REGION: ICD-10-CM

## 2020-12-01 DIAGNOSIS — R55 NEAR SYNCOPE: ICD-10-CM

## 2020-12-01 DIAGNOSIS — R19.00 INTRAABDOMINAL MASS: ICD-10-CM

## 2020-12-01 DIAGNOSIS — K92.1 MELENA: ICD-10-CM

## 2020-12-01 DIAGNOSIS — D64.9 ANEMIA: ICD-10-CM

## 2020-12-01 DIAGNOSIS — M51.26 LUMBAR DISC HERNIATION: ICD-10-CM

## 2020-12-01 DIAGNOSIS — T80.1XXA IV INFILTRATE, INITIAL ENCOUNTER: ICD-10-CM

## 2020-12-01 LAB
ABO GROUP BLD: NORMAL
ALBUMIN SERPL BCP-MCNC: 2.7 G/DL (ref 3.5–5)
ALP SERPL-CCNC: 75 U/L (ref 46–116)
ALT SERPL W P-5'-P-CCNC: 19 U/L (ref 12–78)
ANION GAP SERPL CALCULATED.3IONS-SCNC: 5 MMOL/L (ref 4–13)
AST SERPL W P-5'-P-CCNC: 9 U/L (ref 5–45)
BASOPHILS # BLD AUTO: 0.07 THOUSANDS/ΜL (ref 0–0.1)
BASOPHILS NFR BLD AUTO: 1 % (ref 0–1)
BILIRUB SERPL-MCNC: 0.22 MG/DL (ref 0.2–1)
BLD GP AB SCN SERPL QL: NEGATIVE
BUN SERPL-MCNC: 41 MG/DL (ref 5–25)
CALCIUM ALBUM COR SERPL-MCNC: 9.6 MG/DL (ref 8.3–10.1)
CALCIUM SERPL-MCNC: 8.6 MG/DL (ref 8.3–10.1)
CHLORIDE SERPL-SCNC: 112 MMOL/L (ref 100–108)
CO2 SERPL-SCNC: 25 MMOL/L (ref 21–32)
CREAT SERPL-MCNC: 0.47 MG/DL (ref 0.6–1.3)
D DIMER PPP FEU-MCNC: 1.17 UG/ML FEU
EOSINOPHIL # BLD AUTO: 0.04 THOUSAND/ΜL (ref 0–0.61)
EOSINOPHIL NFR BLD AUTO: 0 % (ref 0–6)
ERYTHROCYTE [DISTWIDTH] IN BLOOD BY AUTOMATED COUNT: 12.8 % (ref 11.6–15.1)
GFR SERPL CREATININE-BSD FRML MDRD: 94 ML/MIN/1.73SQ M
GLUCOSE SERPL-MCNC: 131 MG/DL (ref 65–140)
HCT VFR BLD AUTO: 21.3 % (ref 34.8–46.1)
HGB BLD-MCNC: 6.6 G/DL (ref 11.5–15.4)
IMM GRANULOCYTES # BLD AUTO: 0.09 THOUSAND/UL (ref 0–0.2)
IMM GRANULOCYTES NFR BLD AUTO: 1 % (ref 0–2)
INR PPP: 1.07 (ref 0.84–1.19)
LIPASE SERPL-CCNC: 111 U/L (ref 73–393)
LYMPHOCYTES # BLD AUTO: 0.97 THOUSANDS/ΜL (ref 0.6–4.47)
LYMPHOCYTES NFR BLD AUTO: 7 % (ref 14–44)
MCH RBC QN AUTO: 28.1 PG (ref 26.8–34.3)
MCHC RBC AUTO-ENTMCNC: 31 G/DL (ref 31.4–37.4)
MCV RBC AUTO: 91 FL (ref 82–98)
MONOCYTES # BLD AUTO: 0.88 THOUSAND/ΜL (ref 0.17–1.22)
MONOCYTES NFR BLD AUTO: 7 % (ref 4–12)
NEUTROPHILS # BLD AUTO: 11.15 THOUSANDS/ΜL (ref 1.85–7.62)
NEUTS SEG NFR BLD AUTO: 84 % (ref 43–75)
NRBC BLD AUTO-RTO: 0 /100 WBCS
PLATELET # BLD AUTO: 284 THOUSANDS/UL (ref 149–390)
PMV BLD AUTO: 9.9 FL (ref 8.9–12.7)
POTASSIUM SERPL-SCNC: 4 MMOL/L (ref 3.5–5.3)
PROT SERPL-MCNC: 5.2 G/DL (ref 6.4–8.2)
PROTHROMBIN TIME: 13.9 SECONDS (ref 11.6–14.5)
RBC # BLD AUTO: 2.35 MILLION/UL (ref 3.81–5.12)
RH BLD: POSITIVE
SODIUM SERPL-SCNC: 142 MMOL/L (ref 136–145)
SPECIMEN EXPIRATION DATE: NORMAL
TROPONIN I SERPL-MCNC: <0.02 NG/ML
TSH SERPL DL<=0.05 MIU/L-ACNC: 2.89 UIU/ML (ref 0.36–3.74)
WBC # BLD AUTO: 13.2 THOUSAND/UL (ref 4.31–10.16)

## 2020-12-01 PROCEDURE — 84443 ASSAY THYROID STIM HORMONE: CPT | Performed by: EMERGENCY MEDICINE

## 2020-12-01 PROCEDURE — 84484 ASSAY OF TROPONIN QUANT: CPT | Performed by: EMERGENCY MEDICINE

## 2020-12-01 PROCEDURE — 85025 COMPLETE CBC W/AUTO DIFF WBC: CPT | Performed by: EMERGENCY MEDICINE

## 2020-12-01 PROCEDURE — 86923 COMPATIBILITY TEST ELECTRIC: CPT

## 2020-12-01 PROCEDURE — 85610 PROTHROMBIN TIME: CPT | Performed by: INTERNAL MEDICINE

## 2020-12-01 PROCEDURE — 74177 CT ABD & PELVIS W/CONTRAST: CPT

## 2020-12-01 PROCEDURE — 1123F ACP DISCUSS/DSCN MKR DOCD: CPT | Performed by: EMERGENCY MEDICINE

## 2020-12-01 PROCEDURE — 36415 COLL VENOUS BLD VENIPUNCTURE: CPT | Performed by: EMERGENCY MEDICINE

## 2020-12-01 PROCEDURE — 85379 FIBRIN DEGRADATION QUANT: CPT | Performed by: EMERGENCY MEDICINE

## 2020-12-01 PROCEDURE — 93005 ELECTROCARDIOGRAM TRACING: CPT

## 2020-12-01 PROCEDURE — 86901 BLOOD TYPING SEROLOGIC RH(D): CPT | Performed by: EMERGENCY MEDICINE

## 2020-12-01 PROCEDURE — P9016 RBC LEUKOCYTES REDUCED: HCPCS

## 2020-12-01 PROCEDURE — 83690 ASSAY OF LIPASE: CPT | Performed by: EMERGENCY MEDICINE

## 2020-12-01 PROCEDURE — 86850 RBC ANTIBODY SCREEN: CPT | Performed by: EMERGENCY MEDICINE

## 2020-12-01 PROCEDURE — 99285 EMERGENCY DEPT VISIT HI MDM: CPT

## 2020-12-01 PROCEDURE — 30233N1 TRANSFUSION OF NONAUTOLOGOUS RED BLOOD CELLS INTO PERIPHERAL VEIN, PERCUTANEOUS APPROACH: ICD-10-PCS | Performed by: EMERGENCY MEDICINE

## 2020-12-01 PROCEDURE — 96365 THER/PROPH/DIAG IV INF INIT: CPT

## 2020-12-01 PROCEDURE — 36430 TRANSFUSION BLD/BLD COMPNT: CPT

## 2020-12-01 PROCEDURE — 96361 HYDRATE IV INFUSION ADD-ON: CPT

## 2020-12-01 PROCEDURE — 80053 COMPREHEN METABOLIC PANEL: CPT | Performed by: EMERGENCY MEDICINE

## 2020-12-01 PROCEDURE — G1004 CDSM NDSC: HCPCS

## 2020-12-01 PROCEDURE — 99291 CRITICAL CARE FIRST HOUR: CPT | Performed by: EMERGENCY MEDICINE

## 2020-12-01 PROCEDURE — 96366 THER/PROPH/DIAG IV INF ADDON: CPT

## 2020-12-01 PROCEDURE — C9113 INJ PANTOPRAZOLE SODIUM, VIA: HCPCS | Performed by: EMERGENCY MEDICINE

## 2020-12-01 PROCEDURE — 86900 BLOOD TYPING SEROLOGIC ABO: CPT | Performed by: EMERGENCY MEDICINE

## 2020-12-01 RX ORDER — HEPARIN SODIUM 5000 [USP'U]/ML
5000 INJECTION, SOLUTION INTRAVENOUS; SUBCUTANEOUS EVERY 8 HOURS SCHEDULED
Status: DISCONTINUED | OUTPATIENT
Start: 2020-12-01 | End: 2020-12-02

## 2020-12-01 RX ADMIN — SODIUM CHLORIDE 8 MG/HR: 9 INJECTION, SOLUTION INTRAVENOUS at 21:34

## 2020-12-01 RX ADMIN — SODIUM CHLORIDE 80 MG: 9 INJECTION, SOLUTION INTRAVENOUS at 22:00

## 2020-12-01 RX ADMIN — IOHEXOL 100 ML: 350 INJECTION, SOLUTION INTRAVENOUS at 21:36

## 2020-12-01 RX ADMIN — SODIUM CHLORIDE 1000 ML: 0.9 INJECTION, SOLUTION INTRAVENOUS at 20:10

## 2020-12-01 NOTE — Clinical Note
Case was discussed with surgery and the patient's admission status was agreed to be Admission Status: inpatient status to the service of Dr Justin Albarado

## 2020-12-02 ENCOUNTER — APPOINTMENT (INPATIENT)
Dept: RADIOLOGY | Facility: HOSPITAL | Age: 80
DRG: 375 | End: 2020-12-02
Payer: MEDICARE

## 2020-12-02 PROBLEM — K25.5 GASTRIC PERFORATION (HCC): Status: ACTIVE | Noted: 2020-12-02

## 2020-12-02 PROBLEM — E78.5 HYPERLIPIDEMIA: Chronic | Status: ACTIVE | Noted: 2019-03-28

## 2020-12-02 PROBLEM — R19.00 INTRAABDOMINAL MASS: Status: ACTIVE | Noted: 2020-12-02

## 2020-12-02 PROBLEM — K92.1 MELENA: Status: ACTIVE | Noted: 2020-12-02

## 2020-12-02 PROBLEM — R55 SYNCOPE: Status: ACTIVE | Noted: 2020-12-02

## 2020-12-02 PROBLEM — E78.5 HYPERLIPIDEMIA: Status: ACTIVE | Noted: 2019-03-28

## 2020-12-02 PROBLEM — D64.9 ANEMIA: Status: ACTIVE | Noted: 2020-12-02

## 2020-12-02 LAB
ABO GROUP BLD BPU: NORMAL
ABO GROUP BLD BPU: NORMAL
ALBUMIN SERPL BCP-MCNC: 2.1 G/DL (ref 3.5–5)
ALP SERPL-CCNC: 57 U/L (ref 46–116)
ALT SERPL W P-5'-P-CCNC: 12 U/L (ref 12–78)
ANION GAP SERPL CALCULATED.3IONS-SCNC: 5 MMOL/L (ref 4–13)
AST SERPL W P-5'-P-CCNC: 9 U/L (ref 5–45)
ATRIAL RATE: 107 BPM
ATRIAL RATE: 108 BPM
ATRIAL RATE: 112 BPM
ATRIAL RATE: 116 BPM
BASOPHILS # BLD AUTO: 0.04 THOUSANDS/ΜL (ref 0–0.1)
BASOPHILS NFR BLD AUTO: 0 % (ref 0–1)
BILIRUB SERPL-MCNC: 0.95 MG/DL (ref 0.2–1)
BPU ID: NORMAL
BPU ID: NORMAL
BUN SERPL-MCNC: 36 MG/DL (ref 5–25)
CA-I BLD-SCNC: 1.08 MMOL/L (ref 1.12–1.32)
CA-I BLD-SCNC: 1.14 MMOL/L (ref 1.12–1.32)
CALCIUM ALBUM COR SERPL-MCNC: 9 MG/DL (ref 8.3–10.1)
CALCIUM SERPL-MCNC: 7.5 MG/DL (ref 8.3–10.1)
CHLORIDE SERPL-SCNC: 121 MMOL/L (ref 100–108)
CO2 SERPL-SCNC: 23 MMOL/L (ref 21–32)
CREAT SERPL-MCNC: 0.39 MG/DL (ref 0.6–1.3)
CROSSMATCH: NORMAL
CROSSMATCH: NORMAL
EOSINOPHIL # BLD AUTO: 0.01 THOUSAND/ΜL (ref 0–0.61)
EOSINOPHIL NFR BLD AUTO: 0 % (ref 0–6)
ERYTHROCYTE [DISTWIDTH] IN BLOOD BY AUTOMATED COUNT: 13.5 % (ref 11.6–15.1)
GFR SERPL CREATININE-BSD FRML MDRD: 100 ML/MIN/1.73SQ M
GLUCOSE SERPL-MCNC: 111 MG/DL (ref 65–140)
GLUCOSE SERPL-MCNC: 127 MG/DL (ref 65–140)
GLUCOSE SERPL-MCNC: 94 MG/DL (ref 65–140)
HCT VFR BLD AUTO: 24.1 % (ref 34.8–46.1)
HGB BLD-MCNC: 7.6 G/DL (ref 11.5–15.4)
HGB BLD-MCNC: 7.8 G/DL (ref 11.5–15.4)
HGB BLD-MCNC: 8.1 G/DL (ref 11.5–15.4)
IMM GRANULOCYTES # BLD AUTO: 0.12 THOUSAND/UL (ref 0–0.2)
IMM GRANULOCYTES NFR BLD AUTO: 1 % (ref 0–2)
INR PPP: 1.22 (ref 0.84–1.19)
LACTATE SERPL-SCNC: 1.1 MMOL/L (ref 0.5–2)
LYMPHOCYTES # BLD AUTO: 1.33 THOUSANDS/ΜL (ref 0.6–4.47)
LYMPHOCYTES NFR BLD AUTO: 11 % (ref 14–44)
MAGNESIUM SERPL-MCNC: 2.1 MG/DL (ref 1.6–2.6)
MAGNESIUM SERPL-MCNC: 2.1 MG/DL (ref 1.6–2.6)
MCH RBC QN AUTO: 29.1 PG (ref 26.8–34.3)
MCHC RBC AUTO-ENTMCNC: 33.6 G/DL (ref 31.4–37.4)
MCV RBC AUTO: 87 FL (ref 82–98)
MONOCYTES # BLD AUTO: 1.2 THOUSAND/ΜL (ref 0.17–1.22)
MONOCYTES NFR BLD AUTO: 10 % (ref 4–12)
NEUTROPHILS # BLD AUTO: 9.4 THOUSANDS/ΜL (ref 1.85–7.62)
NEUTS SEG NFR BLD AUTO: 78 % (ref 43–75)
NRBC BLD AUTO-RTO: 0 /100 WBCS
P AXIS: 78 DEGREES
P AXIS: 79 DEGREES
P AXIS: 82 DEGREES
P AXIS: 90 DEGREES
PHOSPHATE SERPL-MCNC: 2.3 MG/DL (ref 2.3–4.1)
PHOSPHATE SERPL-MCNC: 2.6 MG/DL (ref 2.3–4.1)
PLATELET # BLD AUTO: 177 THOUSANDS/UL (ref 149–390)
PLATELET # BLD AUTO: 221 THOUSANDS/UL (ref 149–390)
PMV BLD AUTO: 9.7 FL (ref 8.9–12.7)
PMV BLD AUTO: 9.9 FL (ref 8.9–12.7)
POTASSIUM SERPL-SCNC: 3.8 MMOL/L (ref 3.5–5.3)
PR INTERVAL: 122 MS
PR INTERVAL: 129 MS
PR INTERVAL: 133 MS
PR INTERVAL: 142 MS
PROT SERPL-MCNC: 4.1 G/DL (ref 6.4–8.2)
PROTHROMBIN TIME: 15.4 SECONDS (ref 11.6–14.5)
QRS AXIS: 49 DEGREES
QRS AXIS: 52 DEGREES
QRS AXIS: 55 DEGREES
QRS AXIS: 80 DEGREES
QRSD INTERVAL: 64 MS
QRSD INTERVAL: 71 MS
QRSD INTERVAL: 75 MS
QRSD INTERVAL: 75 MS
QT INTERVAL: 326 MS
QT INTERVAL: 354 MS
QT INTERVAL: 367 MS
QT INTERVAL: 392 MS
QTC INTERVAL: 453 MS
QTC INTERVAL: 475 MS
QTC INTERVAL: 490 MS
QTC INTERVAL: 536 MS
RBC # BLD AUTO: 2.78 MILLION/UL (ref 3.81–5.12)
SARS-COV-2 N GENE RESP QL NAA+PROBE: NEGATIVE
SODIUM SERPL-SCNC: 149 MMOL/L (ref 136–145)
T WAVE AXIS: 74 DEGREES
T WAVE AXIS: 80 DEGREES
T WAVE AXIS: 81 DEGREES
T WAVE AXIS: 82 DEGREES
UNIT DISPENSE STATUS: NORMAL
UNIT DISPENSE STATUS: NORMAL
UNIT PRODUCT CODE: NORMAL
UNIT PRODUCT CODE: NORMAL
UNIT RH: NORMAL
UNIT RH: NORMAL
VENTRICULAR RATE: 107 BPM
VENTRICULAR RATE: 108 BPM
VENTRICULAR RATE: 112 BPM
VENTRICULAR RATE: 116 BPM
WBC # BLD AUTO: 12.1 THOUSAND/UL (ref 4.31–10.16)

## 2020-12-02 PROCEDURE — 82330 ASSAY OF CALCIUM: CPT | Performed by: SURGERY

## 2020-12-02 PROCEDURE — 93010 ELECTROCARDIOGRAM REPORT: CPT | Performed by: INTERNAL MEDICINE

## 2020-12-02 PROCEDURE — 85049 AUTOMATED PLATELET COUNT: CPT | Performed by: SURGERY

## 2020-12-02 PROCEDURE — 85018 HEMOGLOBIN: CPT | Performed by: PHYSICIAN ASSISTANT

## 2020-12-02 PROCEDURE — 83735 ASSAY OF MAGNESIUM: CPT | Performed by: SURGERY

## 2020-12-02 PROCEDURE — C9113 INJ PANTOPRAZOLE SODIUM, VIA: HCPCS | Performed by: EMERGENCY MEDICINE

## 2020-12-02 PROCEDURE — 84100 ASSAY OF PHOSPHORUS: CPT | Performed by: SURGERY

## 2020-12-02 PROCEDURE — 74177 CT ABD & PELVIS W/CONTRAST: CPT

## 2020-12-02 PROCEDURE — 80053 COMPREHEN METABOLIC PANEL: CPT | Performed by: SURGERY

## 2020-12-02 PROCEDURE — 36415 COLL VENOUS BLD VENIPUNCTURE: CPT | Performed by: SURGERY

## 2020-12-02 PROCEDURE — 85025 COMPLETE CBC W/AUTO DIFF WBC: CPT | Performed by: SURGERY

## 2020-12-02 PROCEDURE — 99222 1ST HOSP IP/OBS MODERATE 55: CPT | Performed by: INTERNAL MEDICINE

## 2020-12-02 PROCEDURE — NC001 PR NO CHARGE

## 2020-12-02 PROCEDURE — U0003 INFECTIOUS AGENT DETECTION BY NUCLEIC ACID (DNA OR RNA); SEVERE ACUTE RESPIRATORY SYNDROME CORONAVIRUS 2 (SARS-COV-2) (CORONAVIRUS DISEASE [COVID-19]), AMPLIFIED PROBE TECHNIQUE, MAKING USE OF HIGH THROUGHPUT TECHNOLOGIES AS DESCRIBED BY CMS-2020-01-R: HCPCS | Performed by: PHYSICIAN ASSISTANT

## 2020-12-02 PROCEDURE — 82948 REAGENT STRIP/BLOOD GLUCOSE: CPT

## 2020-12-02 PROCEDURE — 87040 BLOOD CULTURE FOR BACTERIA: CPT | Performed by: SURGERY

## 2020-12-02 PROCEDURE — 99223 1ST HOSP IP/OBS HIGH 75: CPT | Performed by: SURGERY

## 2020-12-02 PROCEDURE — 93005 ELECTROCARDIOGRAM TRACING: CPT

## 2020-12-02 PROCEDURE — 83605 ASSAY OF LACTIC ACID: CPT | Performed by: SURGERY

## 2020-12-02 PROCEDURE — P9016 RBC LEUKOCYTES REDUCED: HCPCS

## 2020-12-02 PROCEDURE — G1004 CDSM NDSC: HCPCS

## 2020-12-02 PROCEDURE — 85610 PROTHROMBIN TIME: CPT | Performed by: SURGERY

## 2020-12-02 RX ORDER — ONDANSETRON 2 MG/ML
4 INJECTION INTRAMUSCULAR; INTRAVENOUS EVERY 4 HOURS PRN
Status: DISCONTINUED | OUTPATIENT
Start: 2020-12-02 | End: 2020-12-07 | Stop reason: HOSPADM

## 2020-12-02 RX ORDER — SODIUM CHLORIDE, SODIUM GLUCONATE, SODIUM ACETATE, POTASSIUM CHLORIDE, MAGNESIUM CHLORIDE, SODIUM PHOSPHATE, DIBASIC, AND POTASSIUM PHOSPHATE .53; .5; .37; .037; .03; .012; .00082 G/100ML; G/100ML; G/100ML; G/100ML; G/100ML; G/100ML; G/100ML
100 INJECTION, SOLUTION INTRAVENOUS CONTINUOUS
Status: DISCONTINUED | OUTPATIENT
Start: 2020-12-02 | End: 2020-12-04

## 2020-12-02 RX ORDER — HYDROMORPHONE HCL/PF 1 MG/ML
0.2 SYRINGE (ML) INJECTION
Status: DISCONTINUED | OUTPATIENT
Start: 2020-12-02 | End: 2020-12-05

## 2020-12-02 RX ORDER — HYDROMORPHONE HCL/PF 1 MG/ML
0.5 SYRINGE (ML) INJECTION
Status: DISCONTINUED | OUTPATIENT
Start: 2020-12-02 | End: 2020-12-05

## 2020-12-02 RX ORDER — CALCIUM GLUCONATE 20 MG/ML
2 INJECTION, SOLUTION INTRAVENOUS ONCE
Status: COMPLETED | OUTPATIENT
Start: 2020-12-02 | End: 2020-12-02

## 2020-12-02 RX ORDER — POTASSIUM CHLORIDE 14.9 MG/ML
20 INJECTION INTRAVENOUS ONCE
Status: COMPLETED | OUTPATIENT
Start: 2020-12-02 | End: 2020-12-02

## 2020-12-02 RX ADMIN — METRONIDAZOLE 500 MG: 500 INJECTION, SOLUTION INTRAVENOUS at 23:11

## 2020-12-02 RX ADMIN — CEFEPIME HYDROCHLORIDE 2000 MG: 2 INJECTION, POWDER, FOR SOLUTION INTRAVENOUS at 11:55

## 2020-12-02 RX ADMIN — METRONIDAZOLE 500 MG: 500 INJECTION, SOLUTION INTRAVENOUS at 02:45

## 2020-12-02 RX ADMIN — MICAFUNGIN SODIUM 100 MG: 50 INJECTION, POWDER, LYOPHILIZED, FOR SOLUTION INTRAVENOUS at 02:02

## 2020-12-02 RX ADMIN — POTASSIUM CHLORIDE 20 MEQ: 14.9 INJECTION, SOLUTION INTRAVENOUS at 08:58

## 2020-12-02 RX ADMIN — HYDROMORPHONE HYDROCHLORIDE 0.5 MG: 1 INJECTION, SOLUTION INTRAMUSCULAR; INTRAVENOUS; SUBCUTANEOUS at 22:28

## 2020-12-02 RX ADMIN — CEFEPIME HYDROCHLORIDE 2000 MG: 2 INJECTION, POWDER, FOR SOLUTION INTRAVENOUS at 03:15

## 2020-12-02 RX ADMIN — SODIUM CHLORIDE 500 ML: 0.9 INJECTION, SOLUTION INTRAVENOUS at 03:00

## 2020-12-02 RX ADMIN — HYDROMORPHONE HYDROCHLORIDE 0.2 MG: 1 INJECTION, SOLUTION INTRAMUSCULAR; INTRAVENOUS; SUBCUTANEOUS at 11:56

## 2020-12-02 RX ADMIN — METRONIDAZOLE 500 MG: 500 INJECTION, SOLUTION INTRAVENOUS at 07:56

## 2020-12-02 RX ADMIN — SODIUM CHLORIDE, SODIUM GLUCONATE, SODIUM ACETATE, POTASSIUM CHLORIDE, MAGNESIUM CHLORIDE, SODIUM PHOSPHATE, DIBASIC, AND POTASSIUM PHOSPHATE 125 ML/HR: .53; .5; .37; .037; .03; .012; .00082 INJECTION, SOLUTION INTRAVENOUS at 16:08

## 2020-12-02 RX ADMIN — ONDANSETRON 4 MG: 2 INJECTION INTRAMUSCULAR; INTRAVENOUS at 12:04

## 2020-12-02 RX ADMIN — Medication 1 SPRAY: at 17:12

## 2020-12-02 RX ADMIN — SODIUM CHLORIDE 8 MG/HR: 9 INJECTION, SOLUTION INTRAVENOUS at 06:10

## 2020-12-02 RX ADMIN — IOHEXOL 100 ML: 350 INJECTION, SOLUTION INTRAVENOUS at 13:08

## 2020-12-02 RX ADMIN — METRONIDAZOLE 500 MG: 500 INJECTION, SOLUTION INTRAVENOUS at 16:13

## 2020-12-02 RX ADMIN — CALCIUM GLUCONATE 2 G: 20 INJECTION, SOLUTION INTRAVENOUS at 08:51

## 2020-12-02 RX ADMIN — Medication 1 SPRAY: at 13:33

## 2020-12-02 RX ADMIN — HEPARIN SODIUM 5000 UNITS: 5000 INJECTION INTRAVENOUS; SUBCUTANEOUS at 06:44

## 2020-12-02 RX ADMIN — SODIUM CHLORIDE, SODIUM GLUCONATE, SODIUM ACETATE, POTASSIUM CHLORIDE, MAGNESIUM CHLORIDE, SODIUM PHOSPHATE, DIBASIC, AND POTASSIUM PHOSPHATE 125 ML/HR: .53; .5; .37; .037; .03; .012; .00082 INJECTION, SOLUTION INTRAVENOUS at 01:45

## 2020-12-02 RX ADMIN — SODIUM CHLORIDE 8 MG/HR: 9 INJECTION, SOLUTION INTRAVENOUS at 16:07

## 2020-12-02 NOTE — ED ATTENDING ATTESTATION
12/1/2020  ITiarra MD, saw and evaluated the patient  I have discussed the patient with the resident/non-physician practitioner and agree with the resident's/non-physician practitioner's findings, Plan of Care, and MDM as documented in the resident's/non-physician practitioner's note, except where noted  All available labs and Radiology studies were reviewed  I was present for key portions of any procedure(s) performed by the resident/non-physician practitioner and I was immediately available to provide assistance  At this point I agree with the current assessment done in the Emergency Department  I have conducted an independent evaluation of this patient a history and physical is as follows:    [de-identified] y/o F presenting after 2 syncopal vs near-syncopal episodes this morning, witnessed by  who lowered her to floor, no head strike  No seizure-like activity  Patient has had generalized weakness, LUQ abd pain, nausea, and dark tarry stool as well  She has recently been taking motrin and ASA for her abd pain  No chest pain or dyspnea  On exam patient is tired and pale appearing  Heart tachycardic, regular, no M/R/G  Lungs CTA/B  Abd soft/ND  There is epigastric tenderness with no rebound or guarding  Rectal exam by resident with melana, hemoccult positive  Hgb 6 6  Patient consented for transfusion, two large-bore IV's placed and fluids initiated  Started on pantoprazole  CT scan done which shows large mass in the stomach with air tracking from mass into fluid collection, with likely perforation  GI had planned for endoscopy in the morning, but we will now consult surgery as well      ED Course         Critical Care Time  CriticalCare Time  Performed by: Tiarra Santiago MD  Authorized by: Tiarra Santiago MD     Critical care provider statement:     Critical care time (minutes):  33    Critical care time was exclusive of:  Separately billable procedures and treating other patients and teaching time    Critical care was necessary to treat or prevent imminent or life-threatening deterioration of the following conditions:  Shock and circulatory failure    Critical care was time spent personally by me on the following activities:  Obtaining history from patient or surrogate, development of treatment plan with patient or surrogate, discussions with consultants, evaluation of patient's response to treatment, examination of patient, interpretation of cardiac output measurements, ordering and review of laboratory studies, ordering and performing treatments and interventions, ordering and review of radiographic studies and re-evaluation of patient's condition    I assumed direction of critical care for this patient from another provider in my specialty: no

## 2020-12-02 NOTE — PROGRESS NOTES
Pastoral Care Progress Note    2020  Patient: Mayuri Chinchilla : 1940  Admission Date & Time: 2020 1901  MRN: [id number] CSN: [id number]                     Chaplaincy Interventions Utilized:   Empowerment: Encouraged focus on present, Provided anxiety containment and Provided chaplaincy education    Exploration: Explored hope, Explored emotional needs & resources and Explored spiritual needs & resources    Relationship Building: Cultivated a relationship of care and support and Listened empathically    Ritual: Provided prayer    Chaplaincy Outcomes Achieved:  Expressed intermediate hope    Spiritual Coping Strategies Utilized:   Connectedness and Spiritual comfort       20 1400   Clinical Encounter Type   Visited With Patient   Routine Visit Introduction   Oriental orthodox Encounters   Oriental orthodox Needs Prayer

## 2020-12-02 NOTE — CONSULTS
Consultation - [facility]  Jenney Kehr [de-identified] y o  female MRN: [id number]  Unit/Bed#: ICU 04 Encounter: [id number]        Consults    Reason for Consult / Principal Problem:     Melena      ASSESSMENT AND PLAN:      80-year-old with diverticulosis/diverticulitis status post partial bowel resection 20 years ago,   Family history of colon cancer, presented with melena, weight loss  CT abdomen showed large perigastric mass  In the retroperitoneum with fistulous communication with the stomach  GI consulted for further management  Labs at admission show hemoglobin decreased from  14 5 in 20 19-6 6 admission  Patient received 2 units PRBC and hemoglobin increased to 8 1  Platelet normal   Normal renal function  INR 1 2     1  Melena with anemia  Likely source of bleed is fistulous communication between mass and stomach  And subsequent erosions/ ulceration of gastric mucosa  No signs, symptoms or imaging evidence of perforation/ peritonitis  Patient on PPI drip  Currently hemodynamically stable and appropriate response of hemoglobin to blood transfusions  We plan to do EGD later today pending results of repeat CT scan  2  Perigastric mass  Likely this is cancer given significant weight loss and  features on imaging  Fistulous connection with the stomach  We will do EGD and get biopsy for tissue diagnosis  Kash Meelndez MD  Gastroenterology Fellow  [address]  Date: December 2, 2020      ______________________________________________________________________    HPI:   80-year-old with diverticulosis/diverticulitis status post partial bowel resection 20 years ago, presented with melena, weight loss  CT abdomen showed large perigastric mass  In the retroperitoneum with fistulous communication with the stomach  GI consulted for further management  Patient says that she has been losing weight about 10-15 lb    She developed black tarry stools for the last 3 days   She denies any abdominal pain, nausea  He did have dry heaves yesterday  She had episode of dizziness and almost blackening out at home along with near fall  Overnight she had NG tube placed with blood tinged output  She takes ibuprofen at home  She has family history of colon cancer in her mother  Her last colonoscopy was in 2014 done by Dr Raz Hays  No polyp was found  Repeat was recommended in 5 years  Labs at admission show hemoglobin decreased from  14 5 in 20 19-6 6 admission  Patient received 2 units PRBC and hemoglobin increased to 8 1  Platelet normal   Normal renal function  INR 1 2  REVIEW OF SYSTEMS:    CONSTITUTIONAL: Denies any fever, chills, rigors, and weight loss  HEENT: No earache or tinnitus  Denies hearing loss or visual disturbances  CARDIOVASCULAR: No chest pain or palpitations  RESPIRATORY: Denies any cough, hemoptysis, shortness of breath or dyspnea on exertion  GASTROINTESTINAL: As noted in the History of Present Illness  GENITOURINARY: No problems with urination  Denies any hematuria or dysuria  NEUROLOGIC: No dizziness or vertigo, denies headaches  MUSCULOSKELETAL: Denies any muscle or joint pain  SKIN: Denies skin rashes or itching  ENDOCRINE: Denies excessive thirst  Denies intolerance to heat or cold  PSYCHOSOCIAL: Denies depression or anxiety  Denies any recent memory loss  Historical Information   Past Medical History:   Diagnosis Date    High cholesterol      Past Surgical History:   Procedure Laterality Date    FL INJECTION LEFT HIP (NON ARTHROGRAM)  5/14/2019     Social History   Social History     Substance and Sexual Activity   Alcohol Use Yes    Comment: occasional     Social History     Substance and Sexual Activity   Drug Use Never     Social History     Tobacco Use   Smoking Status Never Smoker   Smokeless Tobacco Never Used     History reviewed  No pertinent family history      Meds/Allergies     Medications Prior to Admission Medication    ibuprofen (MOTRIN) 600 mg tablet    ketorolac (ACULAR) 0 5 % ophthalmic solution    loratadine (CLARITIN) 10 mg tablet     Current Facility-Administered Medications   Medication Dose Route Frequency    cefepime (MAXIPIME) 2 g/50 mL dextrose IVPB  2,000 mg Intravenous Q12H    heparin (porcine) subcutaneous injection 5,000 Units  5,000 Units Subcutaneous Q8H [address]    HYDROmorphone (DILAUDID) injection 0 2 mg  0 2 mg Intravenous Q3H PRN    HYDROmorphone (DILAUDID) injection 0 5 mg  0 5 mg Intravenous Q3H PRN    HYDROmorphone (DILAUDID) injection 0 5 mg  0 5 mg Intravenous Q3H PRN    iohexol (OMNIPAQUE) 240 MG/ML solution 50 mL  50 mL Oral 90 min pre-procedure    metroNIDAZOLE (FLAGYL) IVPB (premix) 500 mg 100 mL  500 mg Intravenous Q8H    micafungin (MYCAMINE) 100 mg in sodium chloride 0 9 % 100 mL IVPB  100 mg Intravenous Q24H    multi-electrolyte (PLASMALYTE-A/ISOLYTE-S PH 7 4) IV solution  125 mL/hr Intravenous Continuous    ondansetron (ZOFRAN) injection 4 mg  4 mg Intravenous Q4H PRN    pantoprazole (PROTONIX) 80 mg in sodium chloride 0 9 % 100 mL infusion  8 mg/hr Intravenous Continuous       Allergies   Allergen Reactions    Bactrim [Sulfamethoxazole-Trimethoprim]     Simvastatin Myalgia           Objective     Blood pressure 146/64, pulse (!) 112, temperature 98 8 °F (37 1 °C), temperature source Oral, resp  rate 15, weight 47 6 kg (105 lb), SpO2 100 %  Body mass index is 20 51 kg/m²        Intake/Output Summary (Last 24 hours) at 12/2/2020 1114  Last data filed at 12/2/2020 1059  Gross per 24 hour   Intake 3659 08 ml   Output 800 ml   Net 2859 08 ml         PHYSICAL EXAM:      General Appearance:   Alert, cooperative, no distress   HEENT:   Normocephalic, atraumatic, anicteric      Neck:  Supple, symmetrical, trachea midline   Lungs:   Clear to auscultation bilaterally; no rales, rhonchi or wheezing; respirations unlabored    Heart[de-identified]   Regular rate and rhythm; no murmur, rub, or gallop     Abdomen:   Soft, non-tender, non-distended; normal bowel sounds; no masses, no organomegaly    Genitalia:   Deferred    Rectal:   Deferred    Extremities:  No cyanosis, clubbing or edema    Pulses:  2+ and symmetric all extremities    Skin:  No jaundice, rashes, or lesions    Lymph nodes:  No palpable cervical lymphadenopathy        Lab Results:   Admission on 12/01/2020   Component Date Value    WBC 12/01/2020 13 20*    RBC 12/01/2020 2 35*    Hemoglobin 12/01/2020 6 6*    Hematocrit 12/01/2020 21 3*    MCV 12/01/2020 91     MCH 12/01/2020 28 1     MCHC 12/01/2020 31 0*    RDW 12/01/2020 12 8     MPV 12/01/2020 9 9     Platelets 17/52/0879 284     nRBC 12/01/2020 0     Neutrophils Relative 12/01/2020 84*    Immat GRANS % 12/01/2020 1     Lymphocytes Relative 12/01/2020 7*    Monocytes Relative 12/01/2020 7     Eosinophils Relative 12/01/2020 0     Basophils Relative 12/01/2020 1     Neutrophils Absolute 12/01/2020 11 15*    Immature Grans Absolute 12/01/2020 0 09     Lymphocytes Absolute 12/01/2020 0 97     Monocytes Absolute 12/01/2020 0 88     Eosinophils Absolute 12/01/2020 0 04     Basophils Absolute 12/01/2020 0 07     Sodium 12/01/2020 142     Potassium 12/01/2020 4 0     Chloride 12/01/2020 112*    CO2 12/01/2020 25     ANION GAP 12/01/2020 5     BUN 12/01/2020 41*    Creatinine 12/01/2020 0 47*    Glucose 12/01/2020 131     Calcium 12/01/2020 8 6     Corrected Calcium 12/01/2020 9 6     AST 12/01/2020 9     ALT 12/01/2020 19     Alkaline Phosphatase 12/01/2020 75     Total Protein 12/01/2020 5 2*    Albumin 12/01/2020 2 7*    Total Bilirubin 12/01/2020 0 22     eGFR 12/01/2020 94     Lipase 12/01/2020 111     D-Dimer, Quant 12/01/2020 1 17*    Troponin I 12/01/2020 <0 02     TSH 3RD GENERATON 12/01/2020 2 890     Antibody Screen 12/01/2020 Negative     Specimen Expiration Date 12/01/2020 88096050     Unit Product Code 12/02/2020 U0481R83     Unit Number 12/02/2020 [id number]     Unit ABO 12/02/2020 O     Unit RH 12/02/2020 POS     Crossmatch 12/02/2020 Compatible     Unit Dispense Status 12/02/2020 Presumed Trans     Unit Product Code 12/02/2020 [id number]     Unit Number 12/02/2020 [id number]     Unit ABO 12/02/2020 O     Unit RH 12/02/2020 POS     Crossmatch 12/02/2020 Compatible     Unit Dispense Status 12/02/2020 Presumed Trans     Protime 12/01/2020 13 9     INR 12/01/2020 1 07     ABO Grouping 12/01/2020 O     Rh Factor 12/01/2020 Positive     Platelets 36/56/5845 221     MPV 12/02/2020 9 9     LACTIC ACID 12/02/2020 1 1     Calcium, Ionized 12/02/2020 1 14     Magnesium 12/02/2020 2 1     Phosphorus 12/02/2020 2 6     Blood Culture 12/02/2020 Received in Microbiology Lab  Culture in Progress   Blood Culture 12/02/2020 Received in Microbiology Lab  Culture in Progress       WBC 12/02/2020 12 10*    RBC 12/02/2020 2 78*    Hemoglobin 12/02/2020 8 1*    Hematocrit 12/02/2020 24 1*    MCV 12/02/2020 87     MCH 12/02/2020 29 1     MCHC 12/02/2020 33 6     RDW 12/02/2020 13 5     MPV 12/02/2020 9 7     Platelets 77/92/6288 177     nRBC 12/02/2020 0     Neutrophils Relative 12/02/2020 78*    Immat GRANS % 12/02/2020 1     Lymphocytes Relative 12/02/2020 11*    Monocytes Relative 12/02/2020 10     Eosinophils Relative 12/02/2020 0     Basophils Relative 12/02/2020 0     Neutrophils Absolute 12/02/2020 9 40*    Immature Grans Absolute 12/02/2020 0 12     Lymphocytes Absolute 12/02/2020 1 33     Monocytes Absolute 12/02/2020 1 20     Eosinophils Absolute 12/02/2020 0 01     Basophils Absolute 12/02/2020 0 04     Protime 12/02/2020 15 4*    INR 12/02/2020 1 22*    Sodium 12/02/2020 149*    Potassium 12/02/2020 3 8     Chloride 12/02/2020 121*    CO2 12/02/2020 23     ANION GAP 12/02/2020 5     BUN 12/02/2020 36*    Creatinine 12/02/2020 0 39*    Glucose 12/02/2020 127     Calcium 12/02/2020 7 5*  Corrected Calcium 12/02/2020 9 0     AST 12/02/2020 9     ALT 12/02/2020 12     Alkaline Phosphatase 12/02/2020 57     Total Protein 12/02/2020 4 1*    Albumin 12/02/2020 2 1*    Total Bilirubin 12/02/2020 0 95     eGFR 12/02/2020 100     Phosphorus 12/02/2020 2 3     Magnesium 12/02/2020 2 1     Calcium, Ionized 12/02/2020 1 08*    Ventricular Rate 12/02/2020 107     Atrial Rate 12/02/2020 107     ID Interval 12/02/2020 142     QRSD Interval 12/02/2020 75     QT Interval 12/02/2020 367     QTC Interval 12/02/2020 490     P Axis 12/02/2020 79     QRS Axis 12/02/2020 55     T Wave Axis 12/02/2020 82     Ventricular Rate 12/02/2020 112     Atrial Rate 12/02/2020 112     ID Interval 12/02/2020 133     QRSD Interval 12/02/2020 71     QT Interval 12/02/2020 392     QTC Interval 12/02/2020 536     P Axis 12/02/2020 78     QRS Axis 12/02/2020 49     T Wave Axis 12/02/2020 81     Ventricular Rate 12/02/2020 108     Atrial Rate 12/02/2020 108     ID Interval 12/02/2020 129     QRSD Interval 12/02/2020 75     QT Interval 12/02/2020 354     QTC Interval 12/02/2020 475     P Axis 12/02/2020 82     QRS Axis 12/02/2020 52     T Wave Axis 12/02/2020 74     Ventricular Rate 12/01/2020 116     Atrial Rate 12/01/2020 116     ID Interval 12/01/2020 122     QRSD Interval 12/01/2020 64     QT Interval 12/01/2020 326     QTC Interval 12/01/2020 453     P Axis 12/01/2020 90     QRS Axis 12/01/2020 80     T Wave Axis 12/01/2020 80        Imaging Studies: I have personally reviewed pertinent imaging studies

## 2020-12-02 NOTE — CONSULTS
Acceptance Note - Surgical Critical Care   Anand Saldaña [de-identified] y o  female MRN: [id number]  Unit/Bed#: ED 10 Encounter: [id number]      -------------------------------------------------------------------------------------------------------------  Chief Complaint:   Dizziness, abdominal pain, nausea    History of Present Illness     Anand Saldaña is a [de-identified] y o  female with PMHx of diverticulitis, prior partial bowel resection > 21 y ago w Dr Sujey García, HLD, back pain, neuropathy, who presents with syncopal episode earlier this afternoon  Pt also endorsed recent episodes of melena, night sweats, and approximately 15 lb weight loss over the past few months  Noted left sided abd pain over the past few months which comes and goes  Noted some dry heaving today, but denied any vomiting  Noted h/o colon cancer in her family  CT imaging demonstrating large 14 x 9 9cm mass involving lesser sac/ stomach assoc w free fluid free air  On arrival pts abd is completely soft, nontender, non distended  Pt is tachycardic, but otherwise stable  Hgb 6 6 on arrival  Pt receiving 1 u prbc tonight  Will plan operative intervention per surgical oncology in the AM  Pt agreeable to intubation but would like to withhold on chest compressions  History obtained from the patient   -------------------------------------------------------------------------------------------------------------  Assessment and Plan:    Neuro:    Diagnosis: no active issues   Analgesia: iv dilaudid prn  0 25; 0 5 for mild and moderate pain  · Delirium PPx  ? 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, w perforation- free fluid- free air  o Plan: NPO, OR in AM   Bowel Regimen: NO    :    Diagnosis: no active issues  o Plan: monitor strict I/Os  o Place moran  - Monitor UOP and renal function    F/E/N:    F: isolyte @ 100cc/h   E: monitor electrolytes and replete PRN   N: NPO      Heme/Onc:    Diagnosis: chronic blood loss anemia  o Plan: transf   DVT PPx: SQH, SCDs      Endo:    Diagnosis: no active issues  Plan: monitor BG q 6 h, will add SSI if needed for -180 perioperative setting    ID:    Diagnosis: upper GI perforation  o Plan: broad spectrum abx, and antifungal  Cefepime, flagyl, micafungin    - Monitor fever curve and WBC count  MSK/Skin:    Diagnosis: at risk for pressure ulcer  o Plan: frequent turning/repositioning and pressure offloading      Disposition: Admit to Critical Care   Code Status: No Order  --------------------------------------------------------------------------------------------------------------  Review of Systems   Constitutional: Positive for activity change and unexpected weight change  Negative for chills, fatigue and fever  Fatigue   HENT: Negative  Eyes: Negative  Respiratory: Negative  Cardiovascular: Negative  Gastrointestinal: Positive for abdominal pain and nausea  Negative for abdominal distention, constipation and diarrhea  Endocrine: Negative  Genitourinary: Negative  Musculoskeletal: Negative  Skin: Negative  Allergic/Immunologic: Negative  Neurological: Negative  Hematological: Negative  Psychiatric/Behavioral: Negative  A 12-point, complete review of systems was reviewed and negative except as stated above     Physical Exam  Vitals signs reviewed  HENT:      Head: Normocephalic and atraumatic  Nose: Nose normal       Mouth/Throat:      Mouth: Mucous membranes are moist    Eyes:      General: No scleral icterus  Pupils: Pupils are equal, round, and reactive to light  Neck:      Musculoskeletal: Normal range of motion and neck supple     Cardiovascular: Rate and Rhythm: Normal rate  Pulses: Normal pulses  Pulmonary:      Effort: Pulmonary effort is normal    Abdominal:      General: There is no distension  Palpations: Abdomen is soft  There is no mass  Tenderness: There is no abdominal tenderness  There is no guarding or rebound  Hernia: No hernia is present  Genitourinary:     Comments: deferred  Musculoskeletal: Normal range of motion  General: No swelling  Skin:     General: Skin is warm  Capillary Refill: Capillary refill takes 2 to 3 seconds  Neurological:      General: No focal deficit present  Mental Status: She is alert and oriented to person, place, and time  Psychiatric:         Mood and Affect: Mood normal        Gen:  NAD  HENT: NC/AT, MMM, EOMI  CV:  RRR  Lung:  nl effort  Abd:  soft, NT/ND  : Place moran  Ext:  no edema  Skin:  no rashes  Neuro: A&Ox3, M/S grossly intact  Lines: Peripheral IV  --------------------------------------------------------------------------------------------------------------  Vitals:   Vitals:    12/01/20 1911 12/01/20 2121 12/01/20 2304   BP: 135/55 131/66 109/56   BP Location:  Left arm    Pulse: (!) 120 (!) 113 (!) 126   Resp: 18 20 21   Temp:   98 2 °F (36 8 °C)   TempSrc:   Oral   SpO2: 93% 99% 99%   Weight: 47 6 kg (105 lb)       Temp  Min: 98 2 °F (36 8 °C)  Max: 98 2 °F (36 8 °C)  IBW: -92 5 kg     Body mass index is 20 51 kg/m²      Laboratory and Diagnostics:  Results from last 7 days   Lab Units 12/01/20 2009   WBC Thousand/uL 13 20*   HEMOGLOBIN g/dL 6 6*   HEMATOCRIT % 21 3*   PLATELETS Thousands/uL 284   NEUTROS PCT % 84*   MONOS PCT % 7     Results from last 7 days   Lab Units 12/01/20 2009   SODIUM mmol/L 142   POTASSIUM mmol/L 4 0   CHLORIDE mmol/L 112*   CO2 mmol/L 25   ANION GAP mmol/L 5   BUN mg/dL 41*   CREATININE mg/dL 0 47*   CALCIUM mg/dL 8 6   GLUCOSE RANDOM mg/dL 131   ALT U/L 19   AST U/L 9   ALK PHOS U/L 75   ALBUMIN g/dL 2 7*   TOTAL BILIRUBIN mg/dL 0 22          Results from last 7 days   Lab Units 12/01/20 2009   INR  1 07      Results from last 7 days   Lab Units 12/01/20 2009   TROPONIN I ng/mL <0 02         ABG:    VBG:          Micro:        EKG: I have personally reviewed pertinent reports  Imaging: I have personally reviewed pertinent reports  Historical Information   Past Medical History:   Diagnosis Date    High cholesterol      Past Surgical History:   Procedure Laterality Date    FL INJECTION LEFT HIP (NON ARTHROGRAM)  5/14/2019     Social History   Social History     Substance and Sexual Activity   Alcohol Use Yes    Comment: occasional     Social History     Substance and Sexual Activity   Drug Use Never     Social History     Tobacco Use   Smoking Status Never Smoker   Smokeless Tobacco Never Used       Family History:   History reviewed  No pertinent family history  I have reviewed this patient's family history and commented on sigificant items within the HPI      Medications:  Current Facility-Administered Medications   Medication Dose Route Frequency    pantoprazole (PROTONIX) 80 mg in sodium chloride 0 9 % 100 mL infusion  8 mg/hr Intravenous Continuous     Home medications:  Prior to Admission Medications   Prescriptions Last Dose Informant Patient Reported? Taking?   ibuprofen (MOTRIN) 600 mg tablet   No No   Sig: Take 1 tablet (600 mg total) by mouth every 8 (eight) hours as needed for mild pain   ketorolac (ACULAR) 0 5 % ophthalmic solution   No No   Sig: Administer 1 drop to the right eye 2 (two) times a day for 7 days   loratadine (CLARITIN) 10 mg tablet   Yes No   Sig: Take by mouth      Facility-Administered Medications: None     Allergies:   Allergies   Allergen Reactions    Bactrim [Sulfamethoxazole-Trimethoprim]     Simvastatin Myalgia     ------------------------------------------------------------------------------------------------------------  Advance Directive and Living Will:      Power of : POLST:    ------------------------------------------------------------------------------------------------------------  Anticipated Length of Stay is > 2 midnights      Elijah Clark MD        Portions of the record may have been created with voice recognition software  Occasional wrong word or "sound a like" substitutions may have occurred due to the inherent limitations of voice recognition software    Read the chart carefully and recognize, using context, where substitutions have occurred

## 2020-12-02 NOTE — PROGRESS NOTES
STUDENT NOTE  Progress Note  ---------------------------------------------------------------------------------------  HPI/24hr events: Ms Wesley Stahl is an [de-identified] yo F with PMH diverticulitis (bowel resection > 20 yrs ago), HLD, chronic back pain, and neuropathy admitted to ICU following presentation to ED for syncopal episode in the context of recent nausea, vomiting, melanna, and 15 lb weight loss  CT reveals 14x10 cm mass of stomach and lesser sac with free fluid and air suggesting perforation  Upon arrival to ED, HgB of 6 6  Patient has received 2 units packed RBCs  Patient has been persistently tachycardic, likely in the context of dehydration  She received 1 IV bolus and has been on continuous IV fluids  NG tube placed this morning, draining 300 cc of reddish fluid  Repeat radiology today, possible surgical candidate    ---------------------------------------------------------------------------------------  SUBJECTIVE  Patient is visited at bedside this morning  She is pleasant and resting comfortably in bed  Patient is oriented to person, place, and time  She denies abdominal pain, nausea, vomiting  She has been out of bed and to the bathroom once  She denies any issues with urination  She denies any episodes of melanna since arrival at the hospital  She has no appetite, but reports being very thirsty  Ms Wesley Stahl is concerned about the parameters under which she will be or remain intubated  She is ok with being intubated "as long as it takes to heal from the surgery" but does not want to be on it once that time has passed     ---------------------------------------------------------------------------------------    OBJECTIVE     24 Hour Vitals    - Temp: 98 6 (97 9-98 6)  - BP: 114/58 ((114-135)/(51-68))  - HR: 104 (104-132)  - RR: 15 (13-20)  - SpO2: 99 ()    Pertinent Laboratory and Diagnostics:  - HgB: 8 1 > 6 6  - WBCs: 12 1 > 13 2  - Neutrophils: 78 > 84  - BUN 36 > 41  - Cr 0 39 > 0 47    Micro  - Blood culture: pending     Imaging:  - CT abdomen w/ contrast: mass of lesser sac with free air and fluid collection suggestive of perforation    Physical Exam  - General Appearance: thin, pale, resting comfortably in bed  - Psych: well oriented, not anxious or depressed at this time  - CVS  o HR: rapid  o Rhythm: regular  o Pulses: +2 in bilateral wrists  - Pulm  o Work of breathing: normal  o Ascultation: clear bilaterally  - Abdominal  o No distention, tenderness, rebound tenderness  o Bowel sounds present  - MSK  o No LE edema    Intake and Output    Intake/Output Summary (Last 24 hours) at 12/2/2020 1015  Last data filed at 12/2/2020 0801  Gross per 24 hour   Intake 3309 08 ml   Output 800 ml   Net 2509 08 ml        Scheduled Meds:  Current Facility-Administered Medications   Medication Dose Route Frequency Provider Last Rate    cefepime  2,000 mg Intravenous Q12H Kateryna Bruno MD 2,000 mg (12/02/20 0315)    heparin (porcine)  5,000 Units Subcutaneous Q8H [facility] Kateryna Bruno MD      HYDROmorphone  0 2 mg Intravenous Q3H PRN Kateryna Bruno MD      HYDROmorphone  0 5 mg Intravenous Q3H PRN Kateryna Bruno MD      HYDROmorphone  0 5 mg Intravenous Q3H PRN Kateryna Bruno MD      iohexol  50 mL Oral 90 min pre-procedure Angela Reis PA-C      metroNIDAZOLE  500 mg Intravenous Q8H Kateryna Bruno MD Stopped (12/02/20 0851)    micafungin  100 mg Intravenous Q24H Kateryna Bruno  mg (12/02/20 0202)    multi-electrolyte  125 mL/hr Intravenous Continuous Kateryna Bruno  mL/hr (12/02/20 0756)    ondansetron  4 mg Intravenous Q4H PRN Kateryna Bruno MD      pantoprozole (PROTONIX) infusion (Continuous)  8 mg/hr Intravenous Continuous Michelle Loredo MD 8 mg/hr (12/02/20 0610)    potassium chloride  20 mEq Intravenous Once Kateryna Bruno MD 20 mEq (12/02/20 0858)     PRN Meds:    HYDROmorphone    HYDROmorphone    HYDROmorphone    ondansetron     Allergies   Allergies   Allergen Reactions  Bactrim [Sulfamethoxazole-Trimethoprim]     Simvastatin Myalgia     ---------------------------------------------------------------------------------------    ASSESSMENT AND PLAN    Neuro:   - Diagnosis: delirium prevention  o Regulate sleep wake cycle  o Continue to orient patient   - Diagnosis: pain management  o Moderate: IV hydromorphone 0 3 mg PRN  o Severe: IV hydromorphone 0 5 mg PRN  o Breakthrough: IV hydromorphone 0 5 mg PRN     Pulm:  - No acute diagnoses  o Nasal cannula PRN for SpO2 <90%    GI:   - Diagnosis: 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; 14x10 cm mass of lesser sac with likely perforation  o NPO  o Repeat CT imaging  - Diagnosis: melena  o Continuous protonix    :   - No acute diagnoses  o Continue to monitor I/Os  o Continue to monitor renal function    Fluids/Electrolytes/N:   - Fluids: continuous isolyte 125 mL/hr  - Electrolytes: Replete electrolytes as needed  - Diet: NPO     Heme/Onc:   - Diagnosis: DVT prophylaxis  o SCDs  o SQH following surgery     Endo:   - No acute diagnoses   - Monitor blood glucose; consider sliding scale insulin     ID:  - Diagnosis: likely upper GI perforation   -  Trend WBCs   -  Trend temperature   -  Cefepime    -  Flagyl   -  Micafugin  - Diagnosis: unknow COVID status   - COVID swab    MSK/Skin:   - Diagnosis: decubitus ulcer prophylaxis   - frequent repositioning    Other:  - Contact son for updates  - Patient does not wish for chest compressions   - Patient is agreeable to intubation and ventilation while healing from surgery, but not indefinitely      ---------------------------------------------------------------------------------------    Jorden Flores

## 2020-12-02 NOTE — ED PROVIDER NOTES
History  Chief Complaint   Patient presents with    Vomiting     pt states that starting around 0800 this morning she had nausea, vomiting and diarrhea 2 episodes of near syncope one this morning and once prior to arrival       80-year-old female with history of hyperlipidemia, chronic back pain, neuropathy who presents for evaluation of near syncope  Patient reports that she felt at baseline upon awakening morning, however, proceeded to have a near syncopal event while walking into the kitchen  She reports feeling lightheaded that is unsure if she completely lost consciousness or not  She reports that her  was standing nearby and he saw her starting to fall to the ground was able to catch her  She then returned to her baseline mental status but continued to feel generally weak throughout the day today  This evening, her son reported that she had a similar episode while sitting on the couch  He told her that her head fell backwards and her eyes seemed to roll to the back of her head  This lasted for a few seconds and she then awoke returning to her baseline  After this episode, she began to feel nauseous and had multiple episodes of dry heaving  She also had multiple episodes of loose, nonbloody stools  She also reports a history of approximately 6 months of intermittent left-sided abdominal discomfort that has occurred approximately 2-3 times per week  She denies chest pain, shortness of breath, fever  Prior to Admission Medications   Prescriptions Last Dose Informant Patient Reported?  Taking?   ibuprofen (MOTRIN) 600 mg tablet   No No   Sig: Take 1 tablet (600 mg total) by mouth every 8 (eight) hours as needed for mild pain   ketorolac (ACULAR) 0 5 % ophthalmic solution   No No   Sig: Administer 1 drop to the right eye 2 (two) times a day for 7 days   loratadine (CLARITIN) 10 mg tablet   Yes No   Sig: Take by mouth      Facility-Administered Medications: None       Past Medical History: Diagnosis Date    High cholesterol        Past Surgical History:   Procedure Laterality Date    FL INJECTION LEFT HIP (NON ARTHROGRAM)  5/14/2019       History reviewed  No pertinent family history  I have reviewed and agree with the history as documented  E-Cigarette/Vaping     E-Cigarette/Vaping Substances     Social History     Tobacco Use    Smoking status: Never Smoker    Smokeless tobacco: Never Used   Substance Use Topics    Alcohol use: Yes     Comment: occasional    Drug use: Never        Review of Systems   Constitutional: Negative for chills and fever  HENT: Negative for congestion, rhinorrhea and sore throat  Eyes: Negative for visual disturbance  Respiratory: Negative for cough, chest tightness and shortness of breath  Cardiovascular: Negative for chest pain and leg swelling  Gastrointestinal: Positive for diarrhea and nausea  Negative for abdominal distention, abdominal pain, blood in stool, constipation and vomiting  Genitourinary: Negative for dysuria, flank pain, frequency, hematuria and urgency  Musculoskeletal: Negative for back pain and gait problem  Skin: Negative for pallor and rash  Neurological: Positive for syncope, weakness (Generalized) and light-headedness  Negative for headaches  All other systems reviewed and are negative        Physical Exam  ED Triage Vitals   Temperature Pulse Respirations Blood Pressure SpO2   12/01/20 2304 12/01/20 1911 12/01/20 1911 12/01/20 1911 12/01/20 1911   98 2 °F (36 8 °C) (!) 120 18 135/55 93 %      Temp Source Heart Rate Source Patient Position - Orthostatic VS BP Location FiO2 (%)   12/01/20 2304 12/01/20 2121 12/01/20 2121 12/01/20 2121 --   Oral Monitor Lying Left arm       Pain Score       12/01/20 2121       No Pain             Orthostatic Vital Signs  Vitals:    12/01/20 2304 12/01/20 2322 12/02/20 0022 12/02/20 0100   BP: 109/56 115/60 121/57 121/57   Pulse: (!) 126 (!) 124 (!) 120 (!) 112   Patient Position - Orthostatic VS:  Lying Lying Lying       Physical Exam  Vitals signs and nursing note reviewed  Constitutional:       General: She is not in acute distress  Appearance: She is well-developed  She is not ill-appearing  HENT:      Head: Normocephalic and atraumatic  Nose: Nose normal       Mouth/Throat:      Mouth: Mucous membranes are moist       Pharynx: No oropharyngeal exudate or posterior oropharyngeal erythema  Eyes:      Extraocular Movements: Extraocular movements intact  Conjunctiva/sclera: Conjunctivae normal       Pupils: Pupils are equal, round, and reactive to light  Neck:      Musculoskeletal: Normal range of motion and neck supple  No neck rigidity  Cardiovascular:      Rate and Rhythm: Regular rhythm  Tachycardia present  Heart sounds: No murmur  No friction rub  No gallop  Pulmonary:      Effort: Pulmonary effort is normal       Breath sounds: Normal breath sounds  No wheezing, rhonchi or rales  Abdominal:      General: Bowel sounds are normal  There is no distension  Palpations: Abdomen is soft  Tenderness: There is no abdominal tenderness  Musculoskeletal: Normal range of motion  General: No swelling or tenderness  Skin:     General: Skin is warm and dry  Findings: No rash  Comments: Patient appears slightly pale  Neurological:      Mental Status: She is alert and oriented to person, place, and time  Comments: Speech normal, no dysarthria  GCS 15  Cranial nerves 2-12 intact  4/5 strength in bilateral upper and lower extremities  No sensory deficits in the upper or lower extremities  Normal finger-nose       Psychiatric:         Behavior: Behavior normal          ED Medications  Medications   pantoprazole (PROTONIX) 80 mg in sodium chloride 0 9 % 100 mL infusion (8 mg/hr Intravenous New Bag 12/1/20 6923)   heparin (porcine) subcutaneous injection 5,000 Units (5,000 Units Subcutaneous Not Given 12/2/20 0024) multi-electrolyte (PLASMALYTE-A/ISOLYTE-S PH 7 4) IV solution (has no administration in time range)   cefepime (MAXIPIME) 2 g/50 mL dextrose IVPB (has no administration in time range)   metroNIDAZOLE (FLAGYL) IVPB (premix) 500 mg 100 mL (has no administration in time range)   micafungin (MYCAMINE) 100 mg in sodium chloride 0 9 % 100 mL IVPB (has no administration in time range)   HYDROmorphone (DILAUDID) injection 0 2 mg (has no administration in time range)   HYDROmorphone (DILAUDID) injection 0 5 mg (has no administration in time range)   HYDROmorphone (DILAUDID) injection 0 5 mg (has no administration in time range)   ondansetron (ZOFRAN) injection 4 mg (has no administration in time range)   sodium chloride 0 9 % bolus 1,000 mL (has no administration in time range)   sodium chloride 0 9 % bolus 1,000 mL (0 mL Intravenous Stopped 12/1/20 2156)   pantoprazole (PROTONIX) 80 mg in sodium chloride 0 9 % 100 mL IVPB (0 mg Intravenous Stopped 12/1/20 2219)   iohexol (OMNIPAQUE) 350 MG/ML injection (MULTI-DOSE) 100 mL (100 mL Intravenous Given 12/1/20 2136)       Diagnostic Studies  Results Reviewed     Procedure Component Value Units Date/Time    Lactic acid, plasma [[id number]]  (Normal) Collected: 12/02/20 0012    Lab Status: Final result Specimen: Blood from Arm, Left Updated: 12/02/20 0049     LACTIC ACID 1 1 mmol/L     Narrative:      Result may be elevated if tourniquet was used during collection      Magnesium [[id number]]  (Normal) Collected: 12/02/20 0012    Lab Status: Final result Specimen: Blood from Arm, Left Updated: 12/02/20 0044     Magnesium 2 1 mg/dL     Phosphorus [[id number]]  (Normal) Collected: 12/02/20 0012    Lab Status: Final result Specimen: Blood from Arm, Left Updated: 12/02/20 0044     Phosphorus 2 6 mg/dL     Calcium, ionized [[id number]]  (Normal) Collected: 12/02/20 0012    Lab Status: Final result Specimen: Blood from Arm, Left Updated: 12/02/20 0036     Calcium, Ionized 1 14 mmol/L Platelet count [[id number]]  (Normal) Collected: 12/02/20 0012    Lab Status: Final result Specimen: Blood from Arm, Left Updated: 12/02/20 0028     Platelets 642 Thousands/uL      MPV 9 9 fL     Blood culture [[id number]]     Lab Status: No result Specimen: Blood     Blood culture [[id number]]     Lab Status: No result Specimen: Blood     Protime-INR [[id number]]  (Normal) Collected: 12/01/20 2009    Lab Status: Final result Specimen: Blood from Arm, Left Updated: 12/01/20 2227     Protime 13 9 seconds      INR 1 07    TSH, 3rd generation with Free T4 reflex [[id number]]  (Normal) Collected: 12/01/20 2009    Lab Status: Final result Specimen: Blood from Arm, Left Updated: 12/01/20 2108     TSH 3RD GENERATON 2 890 uIU/mL     Narrative:      Patients undergoing fluorescein dye angiography may retain small amounts of fluorescein in the body for 48-72 hours post procedure  Samples containing fluorescein can produce falsely depressed TSH values  If the patient had this procedure,a specimen should be resubmitted post fluorescein clearance        Troponin I [[id number]]  (Normal) Collected: 12/01/20 2009    Lab Status: Final result Specimen: Blood from Arm, Left Updated: 12/01/20 2102     Troponin I <0 02 ng/mL     Comprehensive metabolic panel [[id number]]  (Abnormal) Collected: 12/01/20 2009    Lab Status: Final result Specimen: Blood from Arm, Left Updated: 12/01/20 2101     Sodium 142 mmol/L      Potassium 4 0 mmol/L      Chloride 112 mmol/L      CO2 25 mmol/L      ANION GAP 5 mmol/L      BUN 41 mg/dL      Creatinine 0 47 mg/dL      Glucose 131 mg/dL      Calcium 8 6 mg/dL      Corrected Calcium 9 6 mg/dL      AST 9 U/L      ALT 19 U/L      Alkaline Phosphatase 75 U/L      Total Protein 5 2 g/dL      Albumin 2 7 g/dL      Total Bilirubin 0 22 mg/dL      eGFR 94 ml/min/1 73sq m     Narrative:      [place] guidelines for Chronic Kidney Disease (CKD):     Stage 1 with normal or high GFR (GFR > 90 mL/min/1 73 square meters)    Stage 2 Mild CKD (GFR = 60-89 mL/min/1 73 square meters)    Stage 3A Moderate CKD (GFR = 45-59 mL/min/1 73 square meters)    Stage 3B Moderate CKD (GFR = 30-44 mL/min/1 73 square meters)    Stage 4 Severe CKD (GFR = 15-29 mL/min/1 73 square meters)    Stage 5 End Stage CKD (GFR <15 mL/min/1 73 square meters)  Note: GFR calculation is accurate only with a steady state creatinine    Lipase [[id number]]  (Normal) Collected: 12/01/20 2009    Lab Status: Final result Specimen: Blood from Arm, Left Updated: 12/01/20 2101     Lipase 111 u/L     D-Dimer [[id number]]  (Abnormal) Collected: 12/01/20 2009    Lab Status: Final result Specimen: Blood from Arm, Left Updated: 12/01/20 2101     D-Dimer, Quant 1 17 ug/ml FEU     CBC and differential [[id number]]  (Abnormal) Collected: 12/01/20 2009    Lab Status: Final result Specimen: Blood from Arm, Left Updated: 12/01/20 2041     WBC 13 20 Thousand/uL      RBC 2 35 Million/uL      Hemoglobin 6 6 g/dL      Hematocrit 21 3 %      MCV 91 fL      MCH 28 1 pg      MCHC 31 0 g/dL      RDW 12 8 %      MPV 9 9 fL      Platelets 167 Thousands/uL      nRBC 0 /100 WBCs      Neutrophils Relative 84 %      Immat GRANS % 1 %      Lymphocytes Relative 7 %      Monocytes Relative 7 %      Eosinophils Relative 0 %      Basophils Relative 1 %      Neutrophils Absolute 11 15 Thousands/µL      Immature Grans Absolute 0 09 Thousand/uL      Lymphocytes Absolute 0 97 Thousands/µL      Monocytes Absolute 0 88 Thousand/µL      Eosinophils Absolute 0 04 Thousand/µL      Basophils Absolute 0 07 Thousands/µL     POCT urinalysis dipstick [[id number]]     Lab Status: No result Specimen: Urine                  CT abdomen pelvis with contrast   Final Result by Pura Ballard DO (12/01 2227)      Large mass involving the lesser sac of the stomach with air tracking from the superior portion of the mass into a fluid collection suggestive of a perforation          I personally discussed this study with Ladonna Marie on 12/1/2020 at 10:24 PM                             Workstation performed: [id number]               Procedures  Procedures      ED Course  ED Course as of Dec 02 0107   Tue Dec 01, 2020   2042 Hemoglobin(!!): 6 6   2043 CBC and differential(!!)   2147 D/w Dr Xochitl Yeung from GI  He agrees with current management  Scheduled for EGD in the AM       0728 RAD: mass in stomach, air tracking outside the mass into a fluid collection, appears perforated      2233 Red surgery tiger texted regarding patient  They will evaluate  2246 Patient and her son updated on CT read  Informed that general surgery will be down to evaluate her and discuss options  All questions answered  MDM  Number of Diagnoses or Management Options  Anemia: new and requires workup  Mass of stomach: new and requires workup  Near syncope: new and requires workup  Diagnosis management comments: 26-year-old female who presents for evaluation near syncope  Patient tachycardic but otherwise stable on arrival to the emergency department  Differential diagnoses include but not limited to anemia, electrolyte abnormality, PE, cardiac arrhythmia  Plan to obtain cardiac workup, D-dimer, CBC  Will also obtain abdominal labs and CT abdomen pelvis given patient's nausea and diarrhea tonight  Labs show anemia with hemoglobin of 6 6 which is significantly decreased from her baseline  Patient consented for blood transfusion, will transfuse 2 units of PRBCs  This likely accounts for her near syncopal symptoms  On further questioning, patient admits to dark stools recently and reports use of ibuprofen as well as aspirin to control her pain  Hemoccult-positive with gross melena  Started on Protonix  GI made aware of patient  D-dimer elevated, however, given patient's significant anemia likely accounting for her symptoms low suspicion of PE at this time  Will refrain from CT PE study    CT abdomen pelvis showing a large mass within the stomach with possible perforation  This was discussed with red surgery  Patient evaluated by them and subsequently admitted to surgical Oncology  Amount and/or Complexity of Data Reviewed  Clinical lab tests: ordered and reviewed  Tests in the radiology section of CPT®: ordered and reviewed  Decide to obtain previous medical records or to obtain history from someone other than the patient: yes  Review and summarize past medical records: yes  Discuss the patient with other providers: yes    Risk of Complications, Morbidity, and/or Mortality  Presenting problems: high  Diagnostic procedures: low  Management options: high    Patient Progress  Patient progress: stable        Disposition  Final diagnoses:   Near syncope   Anemia   Mass of stomach     Time reflects when diagnosis was documented in both MDM as applicable and the Disposition within this note     Time User Action Codes Description Comment    12/1/2020  9:18 PM Gianna Luis Antonio Add [K92 1] Melena     12/2/2020  1:03 AM Colt Pheasant Add [R55] Near syncope     12/2/2020  1:04 AM Colt Pheasant Add [D64 9] Anemia     12/2/2020  1:04 AM Colt Pheasant Modify [K92 1] Melena     12/2/2020  1:04 AM Colt Pheasant Modify [D64 9] Anemia     12/2/2020  1:04 AM Colt Pheasant Add [K31 89] Mass of stomach       ED Disposition     ED Disposition Condition Date/Time Comment    Admit Stable Wed Dec 2, 2020  1:03 AM Case was discussed with surgery and the patient's admission status was agreed to be Admission Status: inpatient status to the service of Dr Marty Lott  Follow-up Information    None         Patient's Medications   Discharge Prescriptions    No medications on file     Outpatient Discharge Orders   EGD   Standing Status: Future Standing Exp  Date: 12/01/21       PDMP Review     None           ED Provider  Attending physically available and evaluated Jovani Nora   I managed the patient along with the ED Attending      Electronically Signed by         Cheng Choudhury MD  12/02/20 1169

## 2020-12-02 NOTE — PROGRESS NOTES
12/02/20 1500   Clinical Encounter Type   Visited With Patient   Yazidi Encounters   Yazidi Needs Prayer   Sacramental Encounters   Sacrament of Sick-Anointing Anointed

## 2020-12-02 NOTE — H&P
H&P Exam - Surgical Oncology    Mayuri Chinchilla [de-identified] y o  female MRN: [id number]  Unit/Bed#: ED 10 Encounter: [id number]    Assessment/Plan     Assessment:  Pt is an [de-identified] y/o F w melena, wt loss, syncopal event assoc w nausea and abd pain and CT demonstrating incidental 14x 10cm mass in lesser sac/ stomach complicated by perforation  VSS  Afebrile  Tachycardic  abd is soft, nontender, non distended  Labs:   Wbc 13 2  Hgb: 6 6  Cr: 0 47    Plan:  NPO/ NGT  Transfuse 2 U PRBC  IVF isolyte @ 125  protonix gtt  Broad spectrum abx and antifungals  SQH, DVT ppx  Possibly to OR for ex lap, resection of mass on 12/2    History of Present Illness   History, ROS and PFSH unobtainable from any source due to none  HPI:  Mayuri Chinchilla is a [de-identified] y o  female with PMHx of diverticulitis, prior partial bowel resection > 21 y ago w Dr Ramona Kaplan, HLD, back pain, neuropathy, who presents with syncopal episode earlier this afternoon  Pt also endorsed recent episodes of melena, night sweats, and approximately 15 lb weight loss over the past few months  Noted left sided abd pain over the past few months which comes and goes  Noted some dry heaving today, but denied any vomiting  Noted h/o colon cancer in her family  CT imaging demonstrating large 14 x 9 9cm mass involving lesser sac/ stomach assoc w free fluid free air  On arrival pts abd is completely soft, nontender, non distended  Pt is tachycardic, but otherwise stable  Hgb 6 6 on arrival  Pt receiving 1 u prbc tonight  Will plan operative intervention per surgical oncology in the AM  Pt agreeable to intubation but would like to withhold on chest compressions  Review of Systems   Constitutional: Positive for activity change and fatigue  HENT: Negative  Eyes: Negative  Respiratory: Negative  Cardiovascular: Negative  Gastrointestinal: Positive for abdominal pain, blood in stool and nausea  Endocrine: Negative  Genitourinary: Negative      Musculoskeletal: Negative  Skin: Negative  Allergic/Immunologic: Negative  Neurological: Positive for dizziness and syncope  Hematological: Negative  Psychiatric/Behavioral: Negative  Historical Information   Past Medical History:   Diagnosis Date    High cholesterol      Past Surgical History:   Procedure Laterality Date    FL INJECTION LEFT HIP (NON ARTHROGRAM)  5/14/2019     Social History   Social History     Substance and Sexual Activity   Alcohol Use Yes    Comment: occasional     Social History     Substance and Sexual Activity   Drug Use Never     Social History     Tobacco Use   Smoking Status Never Smoker   Smokeless Tobacco Never Used     E-Cigarette/Vaping     E-Cigarette/Vaping Substances     Family History: non-contributory    Meds/Allergies   all current active meds have been reviewed  Allergies   Allergen Reactions    Bactrim [Sulfamethoxazole-Trimethoprim]     Simvastatin Myalgia       Objective   Vitals: Blood pressure 121/57, pulse (!) 120, temperature 97 9 °F (36 6 °C), temperature source Oral, resp  rate 18, weight 47 6 kg (105 lb), SpO2 98 %  ,Body mass index is 20 51 kg/m²  Intake/Output Summary (Last 24 hours) at 12/2/2020 0038  Last data filed at 12/1/2020 2219  Gross per 24 hour   Intake 1100 ml   Output    Net 1100 ml     Invasive Devices     Peripheral Intravenous Line            Peripheral IV 12/01/20 Left Antecubital less than 1 day    Peripheral IV 12/01/20 Right Antecubital less than 1 day                Physical Exam  Vitals signs reviewed  Constitutional:       Appearance: Normal appearance  HENT:      Head: Normocephalic and atraumatic  Nose: Nose normal       Mouth/Throat:      Mouth: Mucous membranes are moist    Eyes:      General: No scleral icterus  Pupils: Pupils are equal, round, and reactive to light  Neck:      Musculoskeletal: Normal range of motion and neck supple  Cardiovascular:      Rate and Rhythm: Normal rate  Pulses: Normal pulses  Pulmonary:      Effort: Pulmonary effort is normal    Abdominal:      General: There is no distension  Palpations: Abdomen is soft  There is no mass  Tenderness: There is no abdominal tenderness  There is no guarding or rebound  Hernia: No hernia is present  Genitourinary:     Comments: deferred  Musculoskeletal: Normal range of motion  Skin:     Capillary Refill: Capillary refill takes 2 to 3 seconds  Neurological:      General: No focal deficit present  Mental Status: She is alert and oriented to person, place, and time  Psychiatric:         Mood and Affect: Mood normal          Lab Results:   I have personally reviewed pertinent reports  , Coags:   Lab Results   Component Value Date    INR 1 07 12/01/2020   , Creatinine:   Lab Results   Component Value Date    CREATININE 0 47 (L) 12/01/2020   , Lipid Panel: No results found for: CHOL, CBC with diff:   Lab Results   Component Value Date    WBC 13 20 (H) 12/01/2020    HGB 6 6 (LL) 12/01/2020    HCT 21 3 (L) 12/01/2020    MCV 91 12/01/2020     12/02/2020    MCH 28 1 12/01/2020    MCHC 31 0 (L) 12/01/2020    RDW 12 8 12/01/2020    MPV 9 9 12/02/2020    NRBC 0 12/01/2020   , BMP/CMP:   Lab Results   Component Value Date    SODIUM 142 12/01/2020    K 4 0 12/01/2020     (H) 12/01/2020    CO2 25 12/01/2020    BUN 41 (H) 12/01/2020    CREATININE 0 47 (L) 12/01/2020    CALCIUM 8 6 12/01/2020    AST 9 12/01/2020    ALT 19 12/01/2020    ALKPHOS 75 12/01/2020    EGFR 94 12/01/2020   , Coags:   Lab Results   Component Value Date    INR 1 07 12/01/2020     Imaging: I have personally reviewed pertinent reports  EKG, Pathology, and Other Studies: I have personally reviewed pertinent reports  Code Status: Level 2 - DNAR: but accepts endotracheal intubation  Advance Directive and Living Will:      Power of :    POLST:      Counseling / Coordination of Care  Counseling/Coordination of Care:  Total floor / unit time spent today 30 minutes  Greater than 50% of total time was spent with the patient and / or family counseling and / or coordination of care   A description of the counseling / coordination of care: 30

## 2020-12-03 ENCOUNTER — ANESTHESIA (INPATIENT)
Dept: GASTROENTEROLOGY | Facility: HOSPITAL | Age: 80
DRG: 375 | End: 2020-12-03
Payer: MEDICARE

## 2020-12-03 ENCOUNTER — APPOINTMENT (INPATIENT)
Dept: GASTROENTEROLOGY | Facility: HOSPITAL | Age: 80
DRG: 375 | End: 2020-12-03
Payer: MEDICARE

## 2020-12-03 ENCOUNTER — TELEPHONE (OUTPATIENT)
Dept: RADIOLOGY | Facility: HOSPITAL | Age: 80
End: 2020-12-03

## 2020-12-03 ENCOUNTER — ANESTHESIA EVENT (INPATIENT)
Dept: GASTROENTEROLOGY | Facility: HOSPITAL | Age: 80
DRG: 375 | End: 2020-12-03
Payer: MEDICARE

## 2020-12-03 VITALS — HEART RATE: 113 BPM

## 2020-12-03 LAB
ANION GAP SERPL CALCULATED.3IONS-SCNC: 6 MMOL/L (ref 4–13)
BASOPHILS # BLD AUTO: 0.04 THOUSANDS/ΜL (ref 0–0.1)
BASOPHILS NFR BLD AUTO: 0 % (ref 0–1)
BUN SERPL-MCNC: 19 MG/DL (ref 5–25)
CA-I BLD-SCNC: 1.11 MMOL/L (ref 1.12–1.32)
CALCIUM SERPL-MCNC: 7.8 MG/DL (ref 8.3–10.1)
CHLORIDE SERPL-SCNC: 114 MMOL/L (ref 100–108)
CO2 SERPL-SCNC: 25 MMOL/L (ref 21–32)
CREAT SERPL-MCNC: 0.39 MG/DL (ref 0.6–1.3)
EOSINOPHIL # BLD AUTO: 0.05 THOUSAND/ΜL (ref 0–0.61)
EOSINOPHIL NFR BLD AUTO: 1 % (ref 0–6)
ERYTHROCYTE [DISTWIDTH] IN BLOOD BY AUTOMATED COUNT: 14.5 % (ref 11.6–15.1)
ERYTHROCYTE [DISTWIDTH] IN BLOOD BY AUTOMATED COUNT: 14.6 % (ref 11.6–15.1)
ERYTHROCYTE [DISTWIDTH] IN BLOOD BY AUTOMATED COUNT: 14.7 % (ref 11.6–15.1)
GFR SERPL CREATININE-BSD FRML MDRD: 100 ML/MIN/1.73SQ M
GLUCOSE SERPL-MCNC: 100 MG/DL (ref 65–140)
GLUCOSE SERPL-MCNC: 103 MG/DL (ref 65–140)
GLUCOSE SERPL-MCNC: 111 MG/DL (ref 65–140)
GLUCOSE SERPL-MCNC: 91 MG/DL (ref 65–140)
GLUCOSE SERPL-MCNC: 98 MG/DL (ref 65–140)
HCT VFR BLD AUTO: 20.5 % (ref 34.8–46.1)
HCT VFR BLD AUTO: 26.3 % (ref 34.8–46.1)
HCT VFR BLD AUTO: 26.7 % (ref 34.8–46.1)
HGB BLD-MCNC: 6.9 G/DL (ref 11.5–15.4)
HGB BLD-MCNC: 8.8 G/DL (ref 11.5–15.4)
HGB BLD-MCNC: 9 G/DL (ref 11.5–15.4)
IMM GRANULOCYTES # BLD AUTO: 0.11 THOUSAND/UL (ref 0–0.2)
IMM GRANULOCYTES NFR BLD AUTO: 1 % (ref 0–2)
LYMPHOCYTES # BLD AUTO: 1.13 THOUSANDS/ΜL (ref 0.6–4.47)
LYMPHOCYTES NFR BLD AUTO: 12 % (ref 14–44)
MAGNESIUM SERPL-MCNC: 2.1 MG/DL (ref 1.6–2.6)
MCH RBC QN AUTO: 29.2 PG (ref 26.8–34.3)
MCH RBC QN AUTO: 29.3 PG (ref 26.8–34.3)
MCH RBC QN AUTO: 29.8 PG (ref 26.8–34.3)
MCHC RBC AUTO-ENTMCNC: 33.5 G/DL (ref 31.4–37.4)
MCHC RBC AUTO-ENTMCNC: 33.7 G/DL (ref 31.4–37.4)
MCHC RBC AUTO-ENTMCNC: 33.7 G/DL (ref 31.4–37.4)
MCV RBC AUTO: 87 FL (ref 82–98)
MCV RBC AUTO: 88 FL (ref 82–98)
MCV RBC AUTO: 88 FL (ref 82–98)
MONOCYTES # BLD AUTO: 0.87 THOUSAND/ΜL (ref 0.17–1.22)
MONOCYTES NFR BLD AUTO: 10 % (ref 4–12)
NEUTROPHILS # BLD AUTO: 6.93 THOUSANDS/ΜL (ref 1.85–7.62)
NEUTS SEG NFR BLD AUTO: 76 % (ref 43–75)
NRBC BLD AUTO-RTO: 0 /100 WBCS
PHOSPHATE SERPL-MCNC: 2.6 MG/DL (ref 2.3–4.1)
PLATELET # BLD AUTO: 152 THOUSANDS/UL (ref 149–390)
PLATELET # BLD AUTO: 155 THOUSANDS/UL (ref 149–390)
PLATELET # BLD AUTO: 168 THOUSANDS/UL (ref 149–390)
PMV BLD AUTO: 10.7 FL (ref 8.9–12.7)
PMV BLD AUTO: 9.6 FL (ref 8.9–12.7)
PMV BLD AUTO: 9.7 FL (ref 8.9–12.7)
POTASSIUM SERPL-SCNC: 3.8 MMOL/L (ref 3.5–5.3)
RBC # BLD AUTO: 2.36 MILLION/UL (ref 3.81–5.12)
RBC # BLD AUTO: 3 MILLION/UL (ref 3.81–5.12)
RBC # BLD AUTO: 3.02 MILLION/UL (ref 3.81–5.12)
SODIUM SERPL-SCNC: 145 MMOL/L (ref 136–145)
WBC # BLD AUTO: 11.06 THOUSAND/UL (ref 4.31–10.16)
WBC # BLD AUTO: 13.68 THOUSAND/UL (ref 4.31–10.16)
WBC # BLD AUTO: 9.13 THOUSAND/UL (ref 4.31–10.16)

## 2020-12-03 PROCEDURE — 0DJ08ZZ INSPECTION OF UPPER INTESTINAL TRACT, VIA NATURAL OR ARTIFICIAL OPENING ENDOSCOPIC: ICD-10-PCS | Performed by: INTERNAL MEDICINE

## 2020-12-03 PROCEDURE — 82330 ASSAY OF CALCIUM: CPT | Performed by: SURGERY

## 2020-12-03 PROCEDURE — C9113 INJ PANTOPRAZOLE SODIUM, VIA: HCPCS | Performed by: EMERGENCY MEDICINE

## 2020-12-03 PROCEDURE — NC001 PR NO CHARGE

## 2020-12-03 PROCEDURE — 80048 BASIC METABOLIC PNL TOTAL CA: CPT | Performed by: SURGERY

## 2020-12-03 PROCEDURE — 99232 SBSQ HOSP IP/OBS MODERATE 35: CPT | Performed by: SURGERY

## 2020-12-03 PROCEDURE — 82948 REAGENT STRIP/BLOOD GLUCOSE: CPT

## 2020-12-03 PROCEDURE — P9016 RBC LEUKOCYTES REDUCED: HCPCS

## 2020-12-03 PROCEDURE — 85025 COMPLETE CBC W/AUTO DIFF WBC: CPT | Performed by: SURGERY

## 2020-12-03 PROCEDURE — 99233 SBSQ HOSP IP/OBS HIGH 50: CPT | Performed by: SURGERY

## 2020-12-03 PROCEDURE — 30233N1 TRANSFUSION OF NONAUTOLOGOUS RED BLOOD CELLS INTO PERIPHERAL VEIN, PERCUTANEOUS APPROACH: ICD-10-PCS | Performed by: PHYSICIAN ASSISTANT

## 2020-12-03 PROCEDURE — 43235 EGD DIAGNOSTIC BRUSH WASH: CPT | Performed by: INTERNAL MEDICINE

## 2020-12-03 PROCEDURE — 99448 NTRPROF PH1/NTRNET/EHR 21-30: CPT | Performed by: PHYSICIAN ASSISTANT

## 2020-12-03 PROCEDURE — 85027 COMPLETE CBC AUTOMATED: CPT | Performed by: STUDENT IN AN ORGANIZED HEALTH CARE EDUCATION/TRAINING PROGRAM

## 2020-12-03 PROCEDURE — 85027 COMPLETE CBC AUTOMATED: CPT | Performed by: SURGERY

## 2020-12-03 PROCEDURE — 83735 ASSAY OF MAGNESIUM: CPT | Performed by: SURGERY

## 2020-12-03 PROCEDURE — 84100 ASSAY OF PHOSPHORUS: CPT | Performed by: SURGERY

## 2020-12-03 RX ORDER — HEPARIN SODIUM 5000 [USP'U]/ML
5000 INJECTION, SOLUTION INTRAVENOUS; SUBCUTANEOUS EVERY 8 HOURS SCHEDULED
Status: DISCONTINUED | OUTPATIENT
Start: 2020-12-03 | End: 2020-12-05

## 2020-12-03 RX ORDER — SUCCINYLCHOLINE/SOD CL,ISO/PF 100 MG/5ML
SYRINGE (ML) INTRAVENOUS AS NEEDED
Status: DISCONTINUED | OUTPATIENT
Start: 2020-12-03 | End: 2020-12-03

## 2020-12-03 RX ORDER — PANTOPRAZOLE SODIUM 40 MG/1
40 INJECTION, POWDER, FOR SOLUTION INTRAVENOUS DAILY
Status: DISCONTINUED | OUTPATIENT
Start: 2020-12-04 | End: 2020-12-05

## 2020-12-03 RX ORDER — HYDRALAZINE HYDROCHLORIDE 20 MG/ML
10 INJECTION INTRAMUSCULAR; INTRAVENOUS EVERY 4 HOURS PRN
Status: DISCONTINUED | OUTPATIENT
Start: 2020-12-03 | End: 2020-12-07 | Stop reason: HOSPADM

## 2020-12-03 RX ORDER — LIDOCAINE HYDROCHLORIDE 10 MG/ML
INJECTION, SOLUTION EPIDURAL; INFILTRATION; INTRACAUDAL; PERINEURAL AS NEEDED
Status: DISCONTINUED | OUTPATIENT
Start: 2020-12-03 | End: 2020-12-03

## 2020-12-03 RX ORDER — LABETALOL 20 MG/4 ML (5 MG/ML) INTRAVENOUS SYRINGE
20 ONCE
Status: DISCONTINUED | OUTPATIENT
Start: 2020-12-03 | End: 2020-12-03

## 2020-12-03 RX ORDER — FENTANYL CITRATE 50 UG/ML
INJECTION, SOLUTION INTRAMUSCULAR; INTRAVENOUS AS NEEDED
Status: DISCONTINUED | OUTPATIENT
Start: 2020-12-03 | End: 2020-12-03

## 2020-12-03 RX ORDER — LABETALOL 20 MG/4 ML (5 MG/ML) INTRAVENOUS SYRINGE
10 EVERY 4 HOURS PRN
Status: DISCONTINUED | OUTPATIENT
Start: 2020-12-03 | End: 2020-12-07 | Stop reason: HOSPADM

## 2020-12-03 RX ORDER — ONDANSETRON 2 MG/ML
INJECTION INTRAMUSCULAR; INTRAVENOUS AS NEEDED
Status: DISCONTINUED | OUTPATIENT
Start: 2020-12-03 | End: 2020-12-03

## 2020-12-03 RX ORDER — PROPOFOL 10 MG/ML
INJECTION, EMULSION INTRAVENOUS AS NEEDED
Status: DISCONTINUED | OUTPATIENT
Start: 2020-12-03 | End: 2020-12-03

## 2020-12-03 RX ORDER — LABETALOL 20 MG/4 ML (5 MG/ML) INTRAVENOUS SYRINGE
10 ONCE
Status: COMPLETED | OUTPATIENT
Start: 2020-12-03 | End: 2020-12-03

## 2020-12-03 RX ADMIN — ONDANSETRON 4 MG: 2 INJECTION INTRAMUSCULAR; INTRAVENOUS at 11:43

## 2020-12-03 RX ADMIN — CEFEPIME HYDROCHLORIDE 2000 MG: 2 INJECTION, POWDER, FOR SOLUTION INTRAVENOUS at 00:06

## 2020-12-03 RX ADMIN — SODIUM CHLORIDE, SODIUM GLUCONATE, SODIUM ACETATE, POTASSIUM CHLORIDE, MAGNESIUM CHLORIDE, SODIUM PHOSPHATE, DIBASIC, AND POTASSIUM PHOSPHATE 100 ML/HR: .53; .5; .37; .037; .03; .012; .00082 INJECTION, SOLUTION INTRAVENOUS at 15:30

## 2020-12-03 RX ADMIN — CEFEPIME HYDROCHLORIDE 2000 MG: 2 INJECTION, POWDER, FOR SOLUTION INTRAVENOUS at 12:13

## 2020-12-03 RX ADMIN — ONDANSETRON 4 MG: 2 INJECTION INTRAMUSCULAR; INTRAVENOUS at 14:53

## 2020-12-03 RX ADMIN — SODIUM CHLORIDE, SODIUM GLUCONATE, SODIUM ACETATE, POTASSIUM CHLORIDE, MAGNESIUM CHLORIDE, SODIUM PHOSPHATE, DIBASIC, AND POTASSIUM PHOSPHATE 125 ML/HR: .53; .5; .37; .037; .03; .012; .00082 INJECTION, SOLUTION INTRAVENOUS at 04:52

## 2020-12-03 RX ADMIN — MICAFUNGIN SODIUM 100 MG: 50 INJECTION, POWDER, LYOPHILIZED, FOR SOLUTION INTRAVENOUS at 00:52

## 2020-12-03 RX ADMIN — METRONIDAZOLE 500 MG: 500 INJECTION, SOLUTION INTRAVENOUS at 16:03

## 2020-12-03 RX ADMIN — SODIUM CHLORIDE 8 MG/HR: 9 INJECTION, SOLUTION INTRAVENOUS at 04:51

## 2020-12-03 RX ADMIN — HEPARIN SODIUM 5000 UNITS: 5000 INJECTION INTRAVENOUS; SUBCUTANEOUS at 23:26

## 2020-12-03 RX ADMIN — HYDROMORPHONE HYDROCHLORIDE 0.2 MG: 1 INJECTION, SOLUTION INTRAMUSCULAR; INTRAVENOUS; SUBCUTANEOUS at 08:11

## 2020-12-03 RX ADMIN — HYDROMORPHONE HYDROCHLORIDE 0.2 MG: 1 INJECTION, SOLUTION INTRAMUSCULAR; INTRAVENOUS; SUBCUTANEOUS at 16:04

## 2020-12-03 RX ADMIN — FENTANYL CITRATE 50 MCG: 50 INJECTION INTRAMUSCULAR; INTRAVENOUS at 14:53

## 2020-12-03 RX ADMIN — LIDOCAINE HYDROCHLORIDE 50 MG: 10 INJECTION, SOLUTION EPIDURAL; INFILTRATION; INTRACAUDAL; PERINEURAL at 14:43

## 2020-12-03 RX ADMIN — CEFEPIME HYDROCHLORIDE 2000 MG: 2 INJECTION, POWDER, FOR SOLUTION INTRAVENOUS at 19:52

## 2020-12-03 RX ADMIN — METRONIDAZOLE 500 MG: 500 INJECTION, SOLUTION INTRAVENOUS at 23:26

## 2020-12-03 RX ADMIN — SODIUM CHLORIDE 8 MG/HR: 9 INJECTION, SOLUTION INTRAVENOUS at 16:08

## 2020-12-03 RX ADMIN — LABETALOL 20 MG/4 ML (5 MG/ML) INTRAVENOUS SYRINGE 10 MG: at 20:06

## 2020-12-03 RX ADMIN — PROPOFOL 100 MG: 10 INJECTION, EMULSION INTRAVENOUS at 14:43

## 2020-12-03 RX ADMIN — Medication 70 MG: at 14:43

## 2020-12-03 RX ADMIN — HYDROMORPHONE HYDROCHLORIDE 0.2 MG: 1 INJECTION, SOLUTION INTRAMUSCULAR; INTRAVENOUS; SUBCUTANEOUS at 18:29

## 2020-12-03 RX ADMIN — METRONIDAZOLE 500 MG: 500 INJECTION, SOLUTION INTRAVENOUS at 08:05

## 2020-12-03 RX ADMIN — FENTANYL CITRATE 50 MCG: 50 INJECTION INTRAMUSCULAR; INTRAVENOUS at 14:43

## 2020-12-03 RX ADMIN — LABETALOL 20 MG/4 ML (5 MG/ML) INTRAVENOUS SYRINGE 10 MG: at 22:17

## 2020-12-03 RX ADMIN — HYDRALAZINE HYDROCHLORIDE 10 MG: 20 INJECTION INTRAMUSCULAR; INTRAVENOUS at 23:29

## 2020-12-03 NOTE — OCCUPATIONAL THERAPY NOTE
OT CANCEL NOTE    OT orders received  Chart reviewed  Pt is pending EGD this AM and has hgb of 6 9  Will hold OT evaluation  Will continue to follow pt on caseload and see pt when medically stable and as clinically appropriate         12/03/20 4571   Note Type   Cancel Reasons Patient off floor/test       Catrachita Pardo MS, OTR/L

## 2020-12-03 NOTE — PLAN OF CARE
Problem: Prexisting or High Potential for Compromised Skin Integrity  Goal: Skin integrity is maintained or improved  Description: INTERVENTIONS:  - Identify patients at risk for skin breakdown  - Assess and monitor skin integrity  - Assess and monitor nutrition and hydration status  - Monitor labs   - Assess for incontinence   - Turn and reposition patient  - Assist with mobility/ambulation  - Relieve pressure over bony prominences  - Avoid friction and shearing  - Provide appropriate hygiene as needed including keeping skin clean and dry  - Evaluate need for skin moisturizer/barrier cream  - Collaborate with interdisciplinary team   - Patient/family teaching  - Consider wound care consult   Outcome: Progressing     Problem: Potential for Falls  Goal: Patient will remain free of falls  Description: INTERVENTIONS:  - Assess patient frequently for physical needs  -  Identify cognitive and physical deficits and behaviors that affect risk of falls    -  [place] fall precautions as indicated by assessment   - Educate patient/family on patient safety including physical limitations  - Instruct patient to call for assistance with activity based on assessment  - Modify environment to reduce risk of injury  - Consider OT/PT consult to assist with strengthening/mobility  Outcome: Progressing     Problem: PAIN - ADULT  Goal: Verbalizes/displays adequate comfort level or baseline comfort level  Description: Interventions:  - Encourage patient to monitor pain and request assistance  - Assess pain using appropriate pain scale  - Administer analgesics based on type and severity of pain and evaluate response  - Implement non-pharmacological measures as appropriate and evaluate response  - Consider cultural and social influences on pain and pain management  - Notify physician/advanced practitioner if interventions unsuccessful or patient reports new pain  Outcome: Progressing     Problem: INFECTION - ADULT  Goal: Absence or prevention of progression during hospitalization  Description: INTERVENTIONS:  - Assess and monitor for signs and symptoms of infection  - Monitor lab/diagnostic results  - Monitor all insertion sites, i e  indwelling lines, tubes, and drains  - Monitor endotracheal if appropriate and nasal secretions for changes in amount and color  - [place] appropriate cooling/warming therapies per order  - Administer medications as ordered  - Instruct and encourage patient and family to use good hand hygiene technique  - Identify and instruct in appropriate isolation precautions for identified infection/condition  Outcome: Progressing     Problem: SAFETY ADULT  Goal: Patient will remain free of falls  Description: INTERVENTIONS:  - Assess patient frequently for physical needs  -  Identify cognitive and physical deficits and behaviors that affect risk of falls    -  [place] fall precautions as indicated by assessment   - Educate patient/family on patient safety including physical limitations  - Instruct patient to call for assistance with activity based on assessment  - Modify environment to reduce risk of injury  - Consider OT/PT consult to assist with strengthening/mobility  Outcome: Progressing  Goal: Maintain or return to baseline ADL function  Description: INTERVENTIONS:  -  Assess patient's ability to carry out ADLs; assess patient's baseline for ADL function and identify physical deficits which impact ability to perform ADLs (bathing, care of mouth/teeth, toileting, grooming, dressing, etc )  - Assess/evaluate cause of self-care deficits   - Assess range of motion  - Assess patient's mobility; develop plan if impaired  - Assess patient's need for assistive devices and provide as appropriate  - Encourage maximum independence but intervene and supervise when necessary  - Involve family in performance of ADLs  - Assess for home care needs following discharge   - Consider OT consult to assist with ADL evaluation and planning for discharge  - Provide patient education as appropriate  Outcome: Progressing  Goal: Maintain or return mobility status to optimal level  Description: INTERVENTIONS:  - Assess patient's baseline mobility status (ambulation, transfers, stairs, etc )    - Identify cognitive and physical deficits and behaviors that affect mobility  - Identify mobility aids required to assist with transfers and/or ambulation (gait belt, sit-to-stand, lift, walker, cane, etc )  - [place] fall precautions as indicated by assessment  - Record patient progress and toleration of activity level on Mobility SBAR; progress patient to next Phase/Stage  - Instruct patient to call for assistance with activity based on assessment  - Consider rehabilitation consult to assist with strengthening/weightbearing, etc   Outcome: Progressing     Problem: DISCHARGE PLANNING  Goal: Discharge to home or other facility with appropriate resources  Description: INTERVENTIONS:  - Identify barriers to discharge w/patient and caregiver  - Arrange for needed discharge resources and transportation as appropriate  - Identify discharge learning needs (meds, wound care, etc )  - Arrange for interpretive services to assist at discharge as needed  - Refer to Case Management Department for coordinating discharge planning if the patient needs post-hospital services based on physician/advanced practitioner order or complex needs related to functional status, cognitive ability, or social support system  Outcome: Progressing     Problem: Knowledge Deficit  Goal: Patient/family/caregiver demonstrates understanding of disease process, treatment plan, medications, and discharge instructions  Description: Complete learning assessment and assess knowledge base    Interventions:  - Provide teaching at level of understanding  - Provide teaching via preferred learning methods  Outcome: Progressing     Problem: GASTROINTESTINAL - ADULT  Goal: Minimal or absence of nausea and/or vomiting  Description: INTERVENTIONS:  - Administer IV fluids if ordered to ensure adequate hydration  - Maintain NPO status until nausea and vomiting are resolved  - Nasogastric tube if ordered  - Administer ordered antiemetic medications as needed  - Provide nonpharmacologic comfort measures as appropriate  - Advance diet as tolerated, if ordered  - Consider nutrition services referral to assist patient with adequate nutrition and appropriate food choices  Outcome: Progressing  Goal: Maintains or returns to baseline bowel function  Description: INTERVENTIONS:  - Assess bowel function  - Encourage oral fluids to ensure adequate hydration  - Administer IV fluids if ordered to ensure adequate hydration  - Administer ordered medications as needed  - Encourage mobilization and activity  - Consider nutritional services referral to assist patient with adequate nutrition and appropriate food choices  Outcome: Progressing     Problem: METABOLIC, FLUID AND ELECTROLYTES - ADULT  Goal: Electrolytes maintained within normal limits  Description: INTERVENTIONS:  - Monitor labs and assess patient for signs and symptoms of electrolyte imbalances  - Administer electrolyte replacement as ordered  - Monitor response to electrolyte replacements, including repeat lab results as appropriate  - Instruct patient on fluid and nutrition as appropriate  Outcome: Progressing  Goal: Fluid balance maintained  Description: INTERVENTIONS:  - Monitor labs   - Monitor I/O and WT  - Instruct patient on fluid and nutrition as appropriate  - Assess for signs & symptoms of volume excess or deficit  Outcome: Progressing     Problem: HEMATOLOGIC - ADULT  Goal: Maintains hematologic stability  Description: INTERVENTIONS  - Assess for signs and symptoms of bleeding or hemorrhage  - Monitor labs  - Administer supportive blood products/factors as ordered and appropriate  Outcome: Progressing

## 2020-12-03 NOTE — QUICK NOTE
Spoke with patient's son Rochelle Lopes and provided the him with an update on her care  I discussed with him about possible IR biopsy of the mass tomorrow Family asked questions and questions were answered

## 2020-12-03 NOTE — PROGRESS NOTES
Pastoral Care Progress Note    12/3/2020  Patient: Jenney Kehr : 1940  Admission Date & Time: 2020 1901  MRN: [id number] CSN: [id number]        Fr Mode Mcgrath visited Pt                 20 1300   Clinical Encounter Type   Visited With Patient   Crisis Visit Critical Care   Sacramental Encounters   Sacrament of Sick-Anointing Anointed

## 2020-12-03 NOTE — TELEMEDICINE
INTERPROFESSIONAL (PHONE) CONSULTATION - Interventional Radiology  Clyde Tripathi [de-identified] y o  female MRN: [id number]  Unit/Bed#: ICU 04 Encounter: [id number]    IR has been consulted to evaluate the patient, determine the appropriate procedure, and whether or not a procedure can and should be performed regarding the care of Clyde Tripathi  We were consulted by critical care concerning intraabdominal mass, and to possibly perform a biopsy of mass if medically appropriate for the patient  IP Consult to IR  Consult performed by: Rogelio Dobbs PA-C  Consult ordered by: Henry Gleason PA-C        12/03/20      Assessment/Recommendation:     [de-identified]year old female presenting with syncope, melena, wt loss, incidental abdominal mass finding 9 3x8 6x14cm    - will tentatively add patient for IR biopsy Friday 12/4  Potential for rescheduling due to IR schedule  - please hold morning anticoagulation  - please keep npo after midnight    Total time spent in review of data, discussion with requesting provider and rendering advice was 25 minutes    Patient or appropriate family member was verbally informed by critical care of this consultative service on their behalf to provide more timely access to specialty care in lieu of an in person consultation  They were informed it is a billable service unless the it was determined an in person follow up was medically necessary by me within the next 14 days at which time only the in person consult would be billed  Verbal consent was obtained  Thank you for allowing Interventional Radiology to participate in the care of Clyde Tripathi  Please don't hesitate to call or TigerText us with any questions       Rogelio Dobbs PA-C

## 2020-12-03 NOTE — QUICK NOTE
Spoke with patient's son and provided the son with an update on her care  Family told about EGD and awaiting discussion between GI and Surg onc  Family asked questions and questions were answered

## 2020-12-03 NOTE — PROGRESS NOTES
STUDENT NOTE  Progress Note    ---------------------------------------------------------------------------------------  HPI/24hr events: Ms Ron Davis is an [de-identified] yo F with PMH diverticulitis (bowel resection > 20 yrs ago), HLD, chronic back pain, and neuropathy admitted to ICU following presentation to ED for syncopal episode in the context of recent nausea, vomiting, melanna, and 15 lb weight loss  CT reveals 14x10 cm mass of stomach and lesser sac with free fluid and air suggesting perforation  Repeat CT yesterday reveals mass with free fluid, no free air  HgB has been downtrend since yesterday am  Another unit PRBCs this am  Plan for EGD with biopsy today     ---------------------------------------------------------------------------------------  SUBJECTIVE  Patient is visited at bedside this morning  She is pleasant and resting comfortably in bed  She reports feeling very weak, but can not tell if it is worse than yesterday  She reports pain of the left mid-back, which is dull, 4/10, and persistent for the last day  She has received PRN pain meds, which help  Patient also reports a dull headache since waking up this morning  She is sleeping well  She reports occasions of nausea (onadnestron helps) but she is not nauseous at this time  She reports no difficulty with urination  She reports diarrhea  She is unsure of quality  She has not been out of bed - bedpan has been brought for voiding  Patient continues to have concerns about her power of  and the indications under which she would be placed on a ventilator     ---------------------------------------------------------------------------------------    OBJECTIVE     24 Hour Vitals    - Temp: 98 4 (98 3-98 8)  - BP: 124/55 (113-146/50-74)  - HR: 90 (112-90)  - RR: 18 (12-24)  - SpO2: 98 ()    Pertinent Laboratory and Diagnostics:  - HgB downtrendin 9 > 7 6 > 7 8 8 1 > 6 6  - WBCs downtrending to within limits: 9 13 > 12 1 > 13 2  - BUN downtrending to within limits:  19> 36 > 41  - Cr stable: 0 39>  0 39 > 0 47    Micro  - Blood culture: pending  - COVID negative     Imaging:  - CT abdomen w/ contrast: gastric with fluid collection and no free air which abuts pancreas, L kidney, L adrenal    Physical Exam  - General Appearance: thin, pale, resting comfortably in bed  - Psych: well oriented, not anxious or depressed at this time  - CVS  o HR: regular  o Rhythm: regular  o Pulses: +2 in bilateral wrists  - Pulm  o Work of breathing: normal  o Ascultation: clear bilaterally  - Abdominal  o No distention, tenderness, rebound tenderness  o Bowel sounds present  - MSK  o No LE edema    Intake and Output    Intake/Output Summary (Last 24 hours) at 12/3/2020 0630  Last data filed at 12/3/2020 0200  Gross per 24 hour   Intake 2787 91 ml   Output 1975 ml   Net 812 91 ml        Scheduled Meds:  Current Facility-Administered Medications   Medication Dose Route Frequency Provider Last Rate    cefepime  2,000 mg Intravenous Q12H Lilly Martinez MD 2,000 mg (12/03/20 0006)    HYDROmorphone  0 2 mg Intravenous Q3H PRN Lilly Martinez MD      HYDROmorphone  0 5 mg Intravenous Q3H PRN Lilly Martinez MD      HYDROmorphone  0 5 mg Intravenous Q3H PRN Lilly Martinez MD      iohexol  50 mL Oral 90 min pre-procedure Angela Reis PA-C      metroNIDAZOLE  500 mg Intravenous Q8H Lilly Martinez MD Stopped (12/03/20 0013)    micafungin  100 mg Intravenous Q24H Lilly Martinez MD Stopped (12/03/20 0200)    multi-electrolyte  125 mL/hr Intravenous Continuous Lilly Martinez  mL/hr (12/03/20 0452)    ondansetron  4 mg Intravenous Q4H PRN Lilly Martinez MD      pantoprozole (PROTONIX) infusion (Continuous)  8 mg/hr Intravenous Continuous Luisa Guthrie MD 8 mg/hr (12/03/20 0451)    phenol  1 spray Mouth/Throat Q2H PRN Jonas Holguin PA-C       PRN Meds:    HYDROmorphone    HYDROmorphone    HYDROmorphone    ondansetron    phenol     Allergies   Allergies Allergen Reactions    Bactrim [Sulfamethoxazole-Trimethoprim]     Simvastatin Myalgia     ---------------------------------------------------------------------------------------    ASSESSMENT AND PLAN    Neuro:   - Diagnosis: delirium prevention  o Regulate sleep wake cycle  o Continue to orient patient   - Diagnosis: pain management  o Consider tylenol PRN pending EGD and surgical plan  o Moderate: IV hydromorphone 0 2 mg PRN  o Severe: IV hydromorphone 0 5 mg PRN  o Breakthrough: IV hydromorphone 0 5 mg PRN     Pulm:  - No acute diagnoses  o Nasal cannula PRN for SpO2 <90%  o Encourage incentive spirometry    CVS  - Diagnosis: tachycardia resolved   - continue to resuscitate fluids    GI:   - Diagnosis: 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; 14x10 cm mass of lesser sac with likely perforation  o NPO  o EGD today w/ GI  o Surg onc following  - Diagnosis: nausea  o onandestron 4 mg IV q4 PRN  - Diagnosis: melena  o Continuous protonix    :   - No acute diagnoses  o Continue to monitor I/Os  o Continue to monitor renal function    Fluids/Electrolytes/N:   - Fluids: continuous isolyte 100 mL/hour (decrease from 125 mL/hr)  - Electrolytes: Replete electrolytes as needed  - Diet: NPO     Heme/Onc:   - Diagnosis: anemia 2o chronic blood loss  o Replete w/ 1 unit PRBCs  - Diagnosis: DVT prophylaxis  o SCDs  o SQH following surgery     Endo:   - No acute diagnoses   - Continue to monitor blood glucose     ID:  - Diagnosis: likely upper GI perforation   -  Trend WBCs   -  Trend temperature   -  Cefepime    -  Flagyl   -  Micafugin    MSK/Skin:   - Diagnosis: decubitus ulcer prophylaxis   - frequent repositioning  ---------------------------------------------------------------------------------------    [address]

## 2020-12-03 NOTE — PHYSICAL THERAPY NOTE
PT orders received  Chart reviewed  Pt is pending EGD today  Will hold PT at this time   Will continue to follow  Owen Venegas, Pt, DPT     12/03/20 0850   PT Last Visit   PT Visit Date 12/03/20   Note Type   Cancel Reasons Patient off floor/test

## 2020-12-03 NOTE — PROGRESS NOTES
Daily Progress Note - Critical Care   Gia Blackmon [de-identified] y o  female MRN: [id number]  Unit/Bed#: ICU 04 Encounter: [id number]        ----------------------------------------------------------------------------------------  HPI/24hr events: Hgb dropped to 6 9 overnight  Patient given 1 unit pRBCs  Otherwise no acute events overnight     ---------------------------------------------------------------------------------------  SUBJECTIVE  [de-identified]year old female with a history of diverticulitis, partial bowel resection 20 yrs ago, who presented with a syncopal episode earlier this afternoon  Review of Systems   Constitutional: Negative for chills and fever  HENT: Negative for hearing loss  Eyes: Negative for visual disturbance  Respiratory: Negative for shortness of breath  Cardiovascular: Negative for chest pain  Gastrointestinal: Negative for constipation, diarrhea, nausea and vomiting  Possible for abdominal pressure   Genitourinary: Negative for difficulty urinating  Musculoskeletal: Negative for myalgias  Skin: Negative for color change  Neurological: Negative for dizziness and headaches  Psychiatric/Behavioral: Negative for agitation  Review of systems was reviewed and negative unless stated above in HPI/24-hour events   ---------------------------------------------------------------------------------------  Assessment and Plan:    Neuro:   · Diagnosis: no active issues  · Analgesia: iv dilaudid prn  0 25; 0 5 for mild and moderate pain  · Delirium PPx  ? Plan: regulate sleep-wake cycle, daily CAM-ICU, delirium precautions     CV:   · Diagnosis: tachycardia  ? Plan: likely 2/2 hypovolemia  Will resuscitate  ? Will continue to resuscitate, likely still hypovolemia as patient is requiring 1 unit of pRBCs      Pulm:  · Diagnosis: no active issues  ? Plan: nasal canula prn for spo2 < 90%     GI:   · Diagnosis: melena  ?  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  Plan: EGD by GI in the AM with biopsy  Surg onc following  · Bowel Regimen: NO     :   · Diagnosis: no active issues  ? Plan: monitor strict I/Os  ? Place moran  § Monitor UOP and renal function     F/E/N:   · F: isolyte @ 125cc/h  · E: monitor electrolytes and replete PRN  · N: NPO        Heme/Onc:   · Diagnosis: chronic blood loss anemia, repeat hgb in the AM 6 9  ? Plan: Transfuse 1 unit pRBCs and repeat CBC post unit  · DVT PPx: SCDs        Endo:   · Diagnosis: no active issues  Plan: monitor BG q 6 h, will add SSI if needed for -180 perioperative setting     ID:   · Diagnosis: upper GI perforation on presentation  No longer present after repeat CT scan  ? Plan: Continue broad spectrum abx, and antifungal  Cefepime, flagyl, micafungin  § Monitor fever curve and WBC count  MSK/Skin:   · Diagnosis: at risk for pressure ulcer  ? Plan: frequent turning/repositioning and pressure offloading       Disposition: Continue Critical Care   Code Status: Level 2 - DNAR: but accepts endotracheal intubation  ---------------------------------------------------------------------------------------  ICU CORE MEASURES    Prophylaxis   VTE Pharmacologic Prophylaxis: Pharmacologic VTE Prophylaxis contraindicated due to procedures  VTE Mechanical Prophylaxis: sequential compression device  Stress Ulcer Prophylaxis: Pantoprazole IV     ABCDE Protocol (if indicated)  Plan to perform spontaneous awakening trial today? Not applicable  Plan to perform spontaneous breathing trial today? Not applicable  Obvious barriers to extubation?  Not applicable  CAM-ICU: Negative    Invasive Devices Review  Invasive Devices     Peripheral Intravenous Line            Peripheral IV 12/01/20 Left Antecubital 1 day    Peripheral IV 12/01/20 Right Antecubital 1 day    Peripheral IV 12/02/20 Right Forearm 1 day          Drain            NG/OG/Enteral Tube Nasogastric Right nares less than 1 day              Can any invasive devices be discontinued today? Not applicable  ---------------------------------------------------------------------------------------  OBJECTIVE    Vitals   Vitals:    20 0200 20 0300 20 0400 20 0500   BP: 113/50 121/50 119/54 124/55   BP Location:       Pulse: 96 94 100 90   Resp: 12 18   Temp:       TempSrc:       SpO2: 97% 98% 98% 98%   Weight:         Temp (24hrs), Av 5 °F (36 9 °C), Min:98 3 °F (36 8 °C), Max:98 8 °F (37 1 °C)  Current: Temperature: 98 4 °F (36 9 °C)  HR: 90   BP: 124/55  RR: 18  SpO2: 98%    Respiratory:  SpO2: SpO2: 98 %       Invasive/non-invasive ventilation settings   Respiratory    Lab Data (Last 4 hours)    None         O2/Vent Data (Last 4 hours)    None                Physical Exam  Vitals signs and nursing note reviewed  Constitutional:       General: She is not in acute distress  Appearance: Normal appearance  She is well-developed  She is not ill-appearing  HENT:      Head: Normocephalic and atraumatic  Eyes:      Extraocular Movements: Extraocular movements intact  Neck:      Musculoskeletal: Normal range of motion and neck supple  Cardiovascular:      Rate and Rhythm: Normal rate and regular rhythm  Heart sounds: Normal heart sounds  No murmur  No friction rub  No gallop  Pulmonary:      Effort: Pulmonary effort is normal  No respiratory distress  Breath sounds: Normal breath sounds  No stridor  No wheezing  Abdominal:      General: Bowel sounds are normal  There is no distension  Palpations: Abdomen is soft  Tenderness: There is no abdominal tenderness  Musculoskeletal: Normal range of motion  Skin:     General: Skin is warm and dry  Neurological:      General: No focal deficit present  Mental Status: She is alert and oriented to person, place, and time     Psychiatric:         Mood and Affect: Mood normal          Behavior: Behavior normal          Laboratory and Diagnostics:  Results from last 7 days   Lab Units 12/03/20  0451 12/02/20  1750 12/02/20  1207 12/02/20  0520 12/02/20  0012 12/01/20 2009   WBC Thousand/uL 9 13  --   --  12 10*  --  13 20*   HEMOGLOBIN g/dL 6 9* 7 6* 7 8* 8 1*  --  6 6*   HEMATOCRIT % 20 5*  --   --  24 1*  --  21 3*   PLATELETS Thousands/uL 168  --   --  177 221 284   NEUTROS PCT % 76*  --   --  78*  --  84*   MONOS PCT % 10  --   --  10  --  7     Results from last 7 days   Lab Units 12/03/20  0451 12/02/20  0520 12/01/20 2009   SODIUM mmol/L 145 149* 142   POTASSIUM mmol/L 3 8 3 8 4 0   CHLORIDE mmol/L 114* 121* 112*   CO2 mmol/L 25 23 25   ANION GAP mmol/L 6 5 5   BUN mg/dL 19 36* 41*   CREATININE mg/dL 0 39* 0 39* 0 47*   CALCIUM mg/dL 7 8* 7 5* 8 6   GLUCOSE RANDOM mg/dL 98 127 131   ALT U/L  --  12 19   AST U/L  --  9 9   ALK PHOS U/L  --  57 75   ALBUMIN g/dL  --  2 1* 2 7*   TOTAL BILIRUBIN mg/dL  --  0 95 0 22     Results from last 7 days   Lab Units 12/03/20  0451 12/02/20  0520 12/02/20  0012   MAGNESIUM mg/dL 2 1 2 1 2 1   PHOSPHORUS mg/dL 2 6 2 3 2 6      Results from last 7 days   Lab Units 12/02/20  0520 12/01/20 2009   INR  1 22* 1 07      Results from last 7 days   Lab Units 12/01/20 2009   TROPONIN I ng/mL <0 02     Results from last 7 days   Lab Units 12/02/20  0012   LACTIC ACID mmol/L 1 1     ABG:    VBG:          Micro  Results from last 7 days   Lab Units 12/02/20  0152 12/02/20  0151   BLOOD CULTURE  Received in Microbiology Lab  Culture in Progress  Received in Microbiology Lab  Culture in Progress  EKG:   Imaging:  I have personally reviewed pertinent reports        Intake and Output  I/O       12/01 0701 - 12/02 0700 12/02 0701 - 12/03 0700    I V  (mL/kg) 459 3 (9 6) 2037 9 (42 8)    Blood 700     NG/GT  0    IV Piggyback 1900 750    Total Intake(mL/kg) 3059 3 (64 3) 2787 9 (58 6)    Urine (mL/kg/hr) 600 1975 (1 7)    Emesis/NG output  0    Total Output 600 1975    Net +2459 3 +812 9              UOP: 1 75 ml/hr     Height and Weights IBW: -92 5 kg  Body mass index is 20 51 kg/m²  Weight (last 2 days)     Date/Time   Weight    12/01/20 1911   47 6 (105)                Nutrition       Diet Orders   (From admission, onward)             Start     Ordered    12/03/20 0001  Diet NPO; Sips with meds  Diet effective midnight     Question Answer Comment   Diet Type NPO    NPO Except:  Sips with meds        12/02/20 1332                    Active Medications  Scheduled Meds:  Current Facility-Administered Medications   Medication Dose Route Frequency Provider Last Rate    cefepime  2,000 mg Intravenous Q12H Jerardo Sampson MD 2,000 mg (12/03/20 0006)    HYDROmorphone  0 2 mg Intravenous Q3H PRN Jerardo Sampson MD      HYDROmorphone  0 5 mg Intravenous Q3H PRN MD Iggy Farrismorphone  0 5 mg Intravenous Q3H PRN Jerardo Sampson MD      iohexol  50 mL Oral 90 min pre-procedure Angela Reis PA-C      metroNIDAZOLE  500 mg Intravenous Q8H Jerardo Sampson MD Stopped (12/03/20 0013)    micafungin  100 mg Intravenous Q24H Jerardo Sampson MD Stopped (12/03/20 0200)    multi-electrolyte  125 mL/hr Intravenous Continuous Jerardo Sampson  mL/hr (12/03/20 0452)    ondansetron  4 mg Intravenous Q4H PRN Jerardo Sampson MD      pantoprozole (PROTONIX) infusion (Continuous)  8 mg/hr Intravenous Continuous Oanh Nam MD 8 mg/hr (12/03/20 0451)    phenol  1 spray Mouth/Throat Q2H PRN Jayant Romo PA-C       Continuous Infusions:  multi-electrolyte, 125 mL/hr, Last Rate: 125 mL/hr (12/03/20 0452)  pantoprozole (PROTONIX) infusion (Continuous), 8 mg/hr, Last Rate: 8 mg/hr (12/03/20 0451)      PRN Meds:   HYDROmorphone, 0 2 mg, Q3H PRN  HYDROmorphone, 0 5 mg, Q3H PRN  HYDROmorphone, 0 5 mg, Q3H PRN  ondansetron, 4 mg, Q4H PRN  phenol, 1 spray, Q2H PRN        Allergies   Allergies   Allergen Reactions    Bactrim [Sulfamethoxazole-Trimethoprim]     Simvastatin Myalgia ---------------------------------------------------------------------------------------  Advance Directive and Living Will:      Power of :    POLST:    ---------------------------------------------------------------------------------------       David Li MD      Portions of the record may have been created with voice recognition software  Occasional wrong word or "sound a like" substitutions may have occurred due to the inherent limitations of voice recognition software    Read the chart carefully and recognize, using context, where substitutions have occurred

## 2020-12-03 NOTE — QUICK NOTE
Provided updates to patient's son Janee Ng: [phone]) regarding new imaging from yesterday and EGD procedure today  He is grateful for the call      [address] Student

## 2020-12-03 NOTE — PROGRESS NOTES
Progress Note - Surgery Oncology  Ji Choi [de-identified] y o  female MRN: [id number]  Unit/Bed#: ICU 04 Encounter: [id number]    Assessment:  Pt is an [de-identified] y/o F w melena, wt loss, syncopal event assoc w nausea and abd pain and CT demonstrating incidental 14x 10cm mass in lesser sac/ stomach   12/2 CTAP PO: ulcerated mass from stomach 9 3x8 6x14, exophytic, mass effect on pancrease, L adrenal, Left kidney  Small free fluid  Plan:  NPO/NGT  IVF  F/u GI for EGD with bx today  Cefepime/flagyl/micafungin  DVT ppx: SCDs    Subjective/Objective   Chief Complaint:     Subjective: REDD  Afebrile  No N/V, now feeling a mild soreness of her LUQ  Objective:     Blood pressure 121/50, pulse 94, temperature 98 4 °F (36 9 °C), temperature source Oral, resp  rate 12, weight 47 6 kg (105 lb), SpO2 98 %  ,Body mass index is 20 51 kg/m²        Intake/Output Summary (Last 24 hours) at 12/3/2020 0425  Last data filed at 12/3/2020 0200  Gross per 24 hour   Intake 3247 24 ml   Output 2300 ml   Net 947 24 ml       Invasive Devices     Peripheral Intravenous Line            Peripheral IV 12/01/20 Left Antecubital 1 day    Peripheral IV 12/01/20 Right Antecubital 1 day    Peripheral IV 12/02/20 Right Forearm 1 day          Drain            NG/OG/Enteral Tube Nasogastric Right nares less than 1 day                Physical Exam: NAD  Atraumatic/normocephalic  AAOX3  NGT in place- scant output  Normal mood and affect  Normal respiratory effort  Soft, non tender, ND  Skin warm/dry  Cap refil <2 sec

## 2020-12-04 ENCOUNTER — ANESTHESIA (INPATIENT)
Dept: GASTROENTEROLOGY | Facility: HOSPITAL | Age: 80
DRG: 375 | End: 2020-12-04
Payer: MEDICARE

## 2020-12-04 ENCOUNTER — APPOINTMENT (INPATIENT)
Dept: GASTROENTEROLOGY | Facility: HOSPITAL | Age: 80
DRG: 375 | End: 2020-12-04
Payer: MEDICARE

## 2020-12-04 ENCOUNTER — ANESTHESIA EVENT (INPATIENT)
Dept: GASTROENTEROLOGY | Facility: HOSPITAL | Age: 80
DRG: 375 | End: 2020-12-04
Payer: MEDICARE

## 2020-12-04 VITALS — HEART RATE: 84 BPM

## 2020-12-04 PROBLEM — I10 HTN (HYPERTENSION): Status: ACTIVE | Noted: 2020-12-04

## 2020-12-04 PROBLEM — R55 SYNCOPE: Status: RESOLVED | Noted: 2020-12-02 | Resolved: 2020-12-04

## 2020-12-04 PROBLEM — K25.5 GASTRIC PERFORATION (HCC): Status: RESOLVED | Noted: 2020-12-02 | Resolved: 2020-12-04

## 2020-12-04 LAB
ABO GROUP BLD BPU: NORMAL
ANION GAP SERPL CALCULATED.3IONS-SCNC: 6 MMOL/L (ref 4–13)
BPU ID: NORMAL
BUN SERPL-MCNC: 19 MG/DL (ref 5–25)
CA-I BLD-SCNC: 1.21 MMOL/L (ref 1.12–1.32)
CALCIUM SERPL-MCNC: 8.3 MG/DL (ref 8.3–10.1)
CHLORIDE SERPL-SCNC: 112 MMOL/L (ref 100–108)
CO2 SERPL-SCNC: 26 MMOL/L (ref 21–32)
CREAT SERPL-MCNC: 0.42 MG/DL (ref 0.6–1.3)
CROSSMATCH: NORMAL
ERYTHROCYTE [DISTWIDTH] IN BLOOD BY AUTOMATED COUNT: 14.6 % (ref 11.6–15.1)
GFR SERPL CREATININE-BSD FRML MDRD: 97 ML/MIN/1.73SQ M
GLUCOSE SERPL-MCNC: 115 MG/DL (ref 65–140)
GLUCOSE SERPL-MCNC: 137 MG/DL (ref 65–140)
GLUCOSE SERPL-MCNC: 140 MG/DL (ref 65–140)
HCT VFR BLD AUTO: 26.1 % (ref 34.8–46.1)
HGB BLD-MCNC: 8.7 G/DL (ref 11.5–15.4)
MAGNESIUM SERPL-MCNC: 2.4 MG/DL (ref 1.6–2.6)
MCH RBC QN AUTO: 29.7 PG (ref 26.8–34.3)
MCHC RBC AUTO-ENTMCNC: 33.3 G/DL (ref 31.4–37.4)
MCV RBC AUTO: 89 FL (ref 82–98)
PHOSPHATE SERPL-MCNC: 2.9 MG/DL (ref 2.3–4.1)
PLATELET # BLD AUTO: 172 THOUSANDS/UL (ref 149–390)
PMV BLD AUTO: 10.2 FL (ref 8.9–12.7)
POTASSIUM SERPL-SCNC: 3.6 MMOL/L (ref 3.5–5.3)
RBC # BLD AUTO: 2.93 MILLION/UL (ref 3.81–5.12)
SODIUM SERPL-SCNC: 144 MMOL/L (ref 136–145)
UNIT DISPENSE STATUS: NORMAL
UNIT PRODUCT CODE: NORMAL
UNIT RH: NORMAL
WBC # BLD AUTO: 12.35 THOUSAND/UL (ref 4.31–10.16)

## 2020-12-04 PROCEDURE — 0D968ZX DRAINAGE OF STOMACH, VIA NATURAL OR ARTIFICIAL OPENING ENDOSCOPIC, DIAGNOSTIC: ICD-10-PCS | Performed by: INTERNAL MEDICINE

## 2020-12-04 PROCEDURE — 97166 OT EVAL MOD COMPLEX 45 MIN: CPT

## 2020-12-04 PROCEDURE — NC001 PR NO CHARGE: Performed by: PHYSICIAN ASSISTANT

## 2020-12-04 PROCEDURE — 85027 COMPLETE CBC AUTOMATED: CPT | Performed by: PHYSICIAN ASSISTANT

## 2020-12-04 PROCEDURE — 82948 REAGENT STRIP/BLOOD GLUCOSE: CPT

## 2020-12-04 PROCEDURE — 88305 TISSUE EXAM BY PATHOLOGIST: CPT | Performed by: PATHOLOGY

## 2020-12-04 PROCEDURE — 82330 ASSAY OF CALCIUM: CPT | Performed by: SURGERY

## 2020-12-04 PROCEDURE — 83735 ASSAY OF MAGNESIUM: CPT | Performed by: SURGERY

## 2020-12-04 PROCEDURE — 43238 EGD US FINE NEEDLE BX/ASPIR: CPT | Performed by: INTERNAL MEDICINE

## 2020-12-04 PROCEDURE — C9113 INJ PANTOPRAZOLE SODIUM, VIA: HCPCS | Performed by: PHYSICIAN ASSISTANT

## 2020-12-04 PROCEDURE — 80048 BASIC METABOLIC PNL TOTAL CA: CPT | Performed by: PHYSICIAN ASSISTANT

## 2020-12-04 PROCEDURE — 97163 PT EVAL HIGH COMPLEX 45 MIN: CPT

## 2020-12-04 PROCEDURE — 88342 IMHCHEM/IMCYTCHM 1ST ANTB: CPT | Performed by: PATHOLOGY

## 2020-12-04 PROCEDURE — 84100 ASSAY OF PHOSPHORUS: CPT | Performed by: SURGERY

## 2020-12-04 PROCEDURE — 88341 IMHCHEM/IMCYTCHM EA ADD ANTB: CPT | Performed by: PATHOLOGY

## 2020-12-04 PROCEDURE — 99233 SBSQ HOSP IP/OBS HIGH 50: CPT | Performed by: SURGERY

## 2020-12-04 PROCEDURE — NC001 PR NO CHARGE: Performed by: SURGERY

## 2020-12-04 PROCEDURE — 99232 SBSQ HOSP IP/OBS MODERATE 35: CPT | Performed by: SURGERY

## 2020-12-04 RX ORDER — LIDOCAINE 50 MG/G
1 PATCH TOPICAL DAILY
Status: DISCONTINUED | OUTPATIENT
Start: 2020-12-04 | End: 2020-12-07 | Stop reason: HOSPADM

## 2020-12-04 RX ORDER — DEXTROSE AND SODIUM CHLORIDE 5; .45 G/100ML; G/100ML
75 INJECTION, SOLUTION INTRAVENOUS CONTINUOUS
Status: DISCONTINUED | OUTPATIENT
Start: 2020-12-04 | End: 2020-12-06

## 2020-12-04 RX ORDER — POTASSIUM CHLORIDE 14.9 MG/ML
20 INJECTION INTRAVENOUS
Status: COMPLETED | OUTPATIENT
Start: 2020-12-04 | End: 2020-12-04

## 2020-12-04 RX ORDER — PROPOFOL 10 MG/ML
INJECTION, EMULSION INTRAVENOUS CONTINUOUS PRN
Status: DISCONTINUED | OUTPATIENT
Start: 2020-12-04 | End: 2020-12-04

## 2020-12-04 RX ORDER — POTASSIUM CHLORIDE 20 MEQ/1
40 TABLET, EXTENDED RELEASE ORAL ONCE
Status: DISCONTINUED | OUTPATIENT
Start: 2020-12-04 | End: 2020-12-04

## 2020-12-04 RX ORDER — FENTANYL CITRATE 50 UG/ML
INJECTION, SOLUTION INTRAMUSCULAR; INTRAVENOUS AS NEEDED
Status: DISCONTINUED | OUTPATIENT
Start: 2020-12-04 | End: 2020-12-04

## 2020-12-04 RX ORDER — SODIUM CHLORIDE, SODIUM GLUCONATE, SODIUM ACETATE, POTASSIUM CHLORIDE, MAGNESIUM CHLORIDE, SODIUM PHOSPHATE, DIBASIC, AND POTASSIUM PHOSPHATE .53; .5; .37; .037; .03; .012; .00082 G/100ML; G/100ML; G/100ML; G/100ML; G/100ML; G/100ML; G/100ML
INJECTION, SOLUTION INTRAVENOUS CONTINUOUS PRN
Status: DISCONTINUED | OUTPATIENT
Start: 2020-12-04 | End: 2020-12-04

## 2020-12-04 RX ORDER — PROPOFOL 10 MG/ML
INJECTION, EMULSION INTRAVENOUS AS NEEDED
Status: DISCONTINUED | OUTPATIENT
Start: 2020-12-04 | End: 2020-12-04

## 2020-12-04 RX ADMIN — PROPOFOL 100 MCG/KG/MIN: 10 INJECTION, EMULSION INTRAVENOUS at 11:51

## 2020-12-04 RX ADMIN — CEFEPIME HYDROCHLORIDE 2000 MG: 2 INJECTION, POWDER, FOR SOLUTION INTRAVENOUS at 03:27

## 2020-12-04 RX ADMIN — HYDROMORPHONE HYDROCHLORIDE 0.2 MG: 1 INJECTION, SOLUTION INTRAMUSCULAR; INTRAVENOUS; SUBCUTANEOUS at 02:59

## 2020-12-04 RX ADMIN — METRONIDAZOLE 500 MG: 500 INJECTION, SOLUTION INTRAVENOUS at 08:42

## 2020-12-04 RX ADMIN — HEPARIN SODIUM 5000 UNITS: 5000 INJECTION INTRAVENOUS; SUBCUTANEOUS at 05:30

## 2020-12-04 RX ADMIN — HEPARIN SODIUM 5000 UNITS: 5000 INJECTION INTRAVENOUS; SUBCUTANEOUS at 14:34

## 2020-12-04 RX ADMIN — CEFEPIME HYDROCHLORIDE 2000 MG: 2 INJECTION, POWDER, FOR SOLUTION INTRAVENOUS at 12:58

## 2020-12-04 RX ADMIN — LIDOCAINE 1 PATCH: 50 PATCH TOPICAL at 13:00

## 2020-12-04 RX ADMIN — PROPOFOL 20 MG: 10 INJECTION, EMULSION INTRAVENOUS at 11:54

## 2020-12-04 RX ADMIN — ONDANSETRON 4 MG: 2 INJECTION INTRAMUSCULAR; INTRAVENOUS at 08:46

## 2020-12-04 RX ADMIN — ONDANSETRON 4 MG: 2 INJECTION INTRAMUSCULAR; INTRAVENOUS at 15:51

## 2020-12-04 RX ADMIN — CEFEPIME HYDROCHLORIDE 2000 MG: 2 INJECTION, POWDER, FOR SOLUTION INTRAVENOUS at 21:40

## 2020-12-04 RX ADMIN — HYDROMORPHONE HYDROCHLORIDE 0.2 MG: 1 INJECTION, SOLUTION INTRAMUSCULAR; INTRAVENOUS; SUBCUTANEOUS at 08:46

## 2020-12-04 RX ADMIN — POTASSIUM CHLORIDE 20 MEQ: 14.9 INJECTION, SOLUTION INTRAVENOUS at 13:02

## 2020-12-04 RX ADMIN — POTASSIUM CHLORIDE 20 MEQ: 14.9 INJECTION, SOLUTION INTRAVENOUS at 14:34

## 2020-12-04 RX ADMIN — HEPARIN SODIUM 5000 UNITS: 5000 INJECTION INTRAVENOUS; SUBCUTANEOUS at 21:40

## 2020-12-04 RX ADMIN — FENTANYL CITRATE 25 MCG: 50 INJECTION INTRAMUSCULAR; INTRAVENOUS at 11:59

## 2020-12-04 RX ADMIN — SODIUM CHLORIDE, SODIUM GLUCONATE, SODIUM ACETATE, POTASSIUM CHLORIDE, MAGNESIUM CHLORIDE, SODIUM PHOSPHATE, DIBASIC, AND POTASSIUM PHOSPHATE 100 ML/HR: .53; .5; .37; .037; .03; .012; .00082 INJECTION, SOLUTION INTRAVENOUS at 11:42

## 2020-12-04 RX ADMIN — ONDANSETRON 4 MG: 2 INJECTION INTRAMUSCULAR; INTRAVENOUS at 00:42

## 2020-12-04 RX ADMIN — DEXTROSE AND SODIUM CHLORIDE 75 ML/HR: 5; .45 INJECTION, SOLUTION INTRAVENOUS at 12:54

## 2020-12-04 RX ADMIN — MICAFUNGIN SODIUM 100 MG: 50 INJECTION, POWDER, LYOPHILIZED, FOR SOLUTION INTRAVENOUS at 00:40

## 2020-12-04 RX ADMIN — PROPOFOL 60 MG: 10 INJECTION, EMULSION INTRAVENOUS at 11:51

## 2020-12-04 RX ADMIN — METRONIDAZOLE 500 MG: 500 INJECTION, SOLUTION INTRAVENOUS at 15:50

## 2020-12-04 RX ADMIN — HYDROMORPHONE HYDROCHLORIDE 0.2 MG: 1 INJECTION, SOLUTION INTRAMUSCULAR; INTRAVENOUS; SUBCUTANEOUS at 18:30

## 2020-12-04 RX ADMIN — FENTANYL CITRATE 25 MCG: 50 INJECTION INTRAMUSCULAR; INTRAVENOUS at 12:16

## 2020-12-04 RX ADMIN — FENTANYL CITRATE 25 MCG: 50 INJECTION INTRAMUSCULAR; INTRAVENOUS at 11:56

## 2020-12-04 RX ADMIN — PANTOPRAZOLE SODIUM 40 MG: 40 INJECTION, POWDER, FOR SOLUTION INTRAVENOUS at 08:46

## 2020-12-04 RX ADMIN — FENTANYL CITRATE 25 MCG: 50 INJECTION INTRAMUSCULAR; INTRAVENOUS at 11:50

## 2020-12-04 NOTE — OCCUPATIONAL THERAPY NOTE
Occupational Therapy Evaluation     Patient Name: Kaity Balbuena  EJNVR'X Date: 12/4/2020  Problem List  Principal Problem:    Intraabdominal mass  Active Problems:    Hyperlipidemia    Melena    Syncope    Anemia    Gastric perforation [facility])    Past Medical History  Past Medical History:   Diagnosis Date    High cholesterol      Past Surgical History  Past Surgical History:   Procedure Laterality Date    FL INJECTION LEFT HIP (NON ARTHROGRAM)  5/14/2019 12/04/20 0914   OT Last Visit   OT Visit Date 12/04/20   Note Type   Note type Evaluation   Restrictions/Precautions   Weight Bearing Precautions Per Order No   Other Precautions Cognitive; Bed Alarm; Chair Alarm;Multiple lines;Telemetry; Fall Risk;Pain   Pain Assessment   Pain Assessment Tool 0-10   Pain Score 5   Pain Location/Orientation Orientation: Left; Location: Arm   Pain Onset/Description Onset: Ongoing   Patient's Stated Pain Goal No pain   Hospital Pain Intervention(s) Repositioned; Ambulation/increased activity; Emotional support   Home Living   Type of [address] [place] [address] Two level;Performs ADLs on one level; Able to live on main level with bedroom/bathroom  (3-4 LESLI)   Bathroom Shower/Tub Tub/shower unit   Bathroom Toilet Standard   Bathroom Equipment   (denies any)   Home Equipment   (denies any)   Additional Comments Pt reports living in 2 SH, 3-4 LESLI, 1st floor setup w/ full bed/bath, spouse sleeps upstairs   Prior Function   Level of [place] Independent with ADLs and functional mobility   Lives With Spouse   Receives Help From Family   ADL Assistance Independent   IADLs Independent   Falls in the last 6 months 0   Vocational Retired   Comments Pt reports being I w/ ADLS/IADLS, transfers and functional mobility PTA   Lifestyle   Autonomy I ADLS, IADLS, transfers and functional mobility PTA   Reciprocal Relationships Pt lives w/ spouse   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   Intrinsic Gratification Enjoys [facility] Psych[facility] (WDL) WDL   ADL   Eating Assistance 7  Independent   Grooming Assistance 5  Supervision/Setup   UB Bathing Assistance 5  Supervision/Setup   LB Bathing Assistance 4  Minimal Assistance   UB Dressing Assistance 5  Supervision/Setup   LB Dressing Assistance 4  Minimal Assistance   Toileting Assistance  4  Minimal Assistance   Functional Assistance 3  Moderate Assistance   Functional Deficit Steadying;Supervision/safety;Verbal cueing; Increased time to complete   Bed Mobility   Supine to Sit 5  Supervision   Additional items Increased time required;Verbal cues   Sit to Supine Unable to assess   Transfers   Sit to Stand 4  Minimal assistance   Additional items Assist x 1; Increased time required;Verbal cues   Stand to Sit 4  Minimal assistance   Additional items Assist x 1; Increased time required;Verbal cues   Additional Comments Pt performed STS from EOB w/ Min A x1 for mild force production into standing and steadying balance, RW for support in standing   Functional Mobility   Functional Mobility 3  Moderate assistance   Additional Comments pt took few small steps from EOB to chair w/ Mod A x1  RW for support   VC for safety   Additional items Rolling walker   Balance   Static Sitting Fair -   Dynamic Sitting Poor +   Static Standing Fair -   Dynamic Standing Poor +   Ambulatory Poor   Activity Tolerance   Activity Tolerance Patient limited by fatigue;Patient limited by pain   Medical Staff Made Aware PT   Nurse Made Aware yes   RUE Assessment   RUE Assessment WFL  (generalized weakness/pain)   LUE Assessment   LUE Assessment WFL  (generalized weakness/pain)   Hand Function   Gross Motor Coordination Functional   Fine Motor Coordination Functional   Sensation   Light Touch No apparent deficits   Vision-Basic Assessment   Current Vision Wears glasses all the time   Vision - Complex Assessment   Ocular Range of Motion WFL   Cognition   Overall Cognitive Status [facility]   Arousal/Participation Responsive; Cooperative   Attention Within functional limits   Orientation Level Oriented X4   Memory Within functional limits   Following Commands Follows all commands and directions without difficulty   Comments Pt is pleasant and cooperative   Assessment   Limitation Decreased ADL status; Decreased UE strength;Decreased endurance;Decreased self-care trans;Decreased high-level ADLs   Prognosis Fair   Assessment Pt is a [de-identified] y/o female seen for OT eval s/p adm to [facility] w/ nausea, syncope and abdominal pain  Pt is dx'd w/ intraabdominal mass  Pt is s/p EGD, planned for IR today for tissue biopsy  Pt  has a past medical history of High cholesterol  Pt with active OT orders and up with assistance  orders  Pt lives with spouse in [address] [place] [address], 1st floor setup, 3-4 ELSLI  Pt was I w/  ADLS and IADLS, drove, & required no use of DME PTA  Pt is currently demonstrating the following occupational deficits: S UB ADLS, Min A LB ADLS, Min A transfers and Mod A functional mobility w/ RW  These deficits that are impacting pt's baseline areas of occupation are a result of the following impairments: pain, endurance, activity tolerance, functional mobility, forward functional reach, balance, functional standing tolerance, decreased I w/ ADLS/IADLS, strength and ROM  The following Occupational Performance Areas to address include: grooming, bathing/shower, toilet hygiene, dressing, health maintenance, functional mobility, community mobility, clothing management and household maintenance  Pt scored overall 45/100 on the Barthel Index  Based on the aforementioned OT evaluation, functional performance deficits, and assessments, pt has been identified as a moderate complexity evaluation  Recommend STR upon D/C, when medically stable  Pending progress and LOS, may progress to Home w/ Home OT and increased family support   Pt to continue to benefit from acute immediate OT services to address the following goals 3-5x/week to  w/in 10-14 days:    Goals Patient Goals to get stronger to be independent again   LTG Time Frame 10-14   Long Term Goal #1 see below listed goals   Plan   Treatment Interventions ADL retraining;Functional transfer training;UE strengthening/ROM; Endurance training;Patient/family training;Equipment evaluation/education; Compensatory technique education;Continued evaluation; Energy conservation; Activityengagement   Goal Expiration Date 12/18/20   OT Frequency 3-5x/wk   Recommendation   OT Discharge Recommendation Post-Acute Rehabilitation Services   OT - OK to Discharge Yes  (when medically stable)   Barthel Index   Feeding 10   Bathing 0   Grooming Score 5   Dressing Score 5   Bladder Score 0   Bowels Score 10   Toilet Use Score 5   Transfers (Bed/Chair) Score 10   Mobility (Level Surface) Score 0   Stairs Score 0   Barthel Index Score 45        GOALS    1) Pt will improve activity tolerance to G for min 30 min txment sessions for increase engagement in functional tasks  2) Pt will complete UB/LB dressing/self care w/ mod I using adaptive device and DME as needed  3) Pt will complete bathing w/ Mod I w/ use of AE and DME as needed  4) Pt will complete toileting w/ mod I w/ G hygiene/thoroughness using DME as needed  5) Pt will improve functional transfers to Mod I on/off all surfaces using DME as needed w/ G balance/safety   6) Pt will improve functional mobility during ADL/IADL/leisure tasks to Mod I using DME as needed w/ G balance/safety   7) Pt will participate in simulated IADL management task to increase independence to Mod I w/ G safety and endurance  8) Pt will be attentive 100% of the time during ongoing cognitive assessment w/ G participation to assist w/ safe d/c planning/recommendations  9) Pt will demonstrate G carryover of pt/caregiver education and training as appropriate w/o cues w/ good tolerance to increase safety during functional tasks  10) Pt will demonstrate 100% carryover of energy conservation techniques t/o functional I/ADL/leisure tasks w/o cues s/p skilled education to increase endurance during functional tasks     Gaye Matos MS, OTR/L

## 2020-12-04 NOTE — NUTRITION
Monitor diet tolerance and % PO intake as advanced to solids  Recommend Ensure Clear TID  Request RD Diet Protocol please

## 2020-12-04 NOTE — PHYSICAL THERAPY NOTE
Physical Therapy Evaluation     Patient's Name: Trish Torrez    Admitting Diagnosis  Melena [K92 1]  Anemia [D64 9]  Mass of stomach [K31 89]  Near syncope [R55]    Problem List  Patient Active Problem List   Diagnosis    Sinusitis    Left groin pain    Numbness    Hyperlipidemia    Osteoporosis    Radiculopathy, lumbar region    Lumbar disc herniation    Lumbar spondylosis    Numbness and tingling of both feet    Intraabdominal mass    Melena    Anemia    HTN (hypertension)       Past Medical History  Past Medical History:   Diagnosis Date    High cholesterol        Past Surgical History  Past Surgical History:   Procedure Laterality Date    FL INJECTION LEFT HIP (NON ARTHROGRAM)  5/14/2019 12/04/20 0913   PT Last Visit   PT Visit Date 12/04/20   Note Type   Note type Evaluation   Pain Assessment   Pain Assessment Tool 0-10   Pain Score 5   Pain Location/Orientation Orientation: Left; Location: Arm   Pain Onset/Description Onset: Ongoing   Patient's Stated Pain Goal No pain   Hospital Pain Intervention(s) Repositioned; Ambulation/increased activity; Emotional support   Home Living   Type of Home House  (2 )   Home Layout Two level; Able to live on main level with bedroom/bathroom;Stairs to enter with rails  (3-4STE, 1st floor setup)   Home Equipment Other (Comment)  (none as per pt)   Prior Function   Level of [place] Independent with ADLs and functional mobility   Lives With Spouse  (pt and spouce are both retired)   Receives Help From Family  (local son and daughter)   [address] [place] [address] in the last 6 months 0   Vocational Retired   Comments Pt reports living wt spouce, and has a local son and daughter who are able to assist PRN   Restrictions/Precautions   Weight Bearing Precautions Per Order No   Other Precautions Chair Alarm; Bed Alarm;Multiple lines;Telemetry; Fall Risk;Pain   General   Family/Caregiver Present No   Cognition   Orientation Level Oriented X4   RLE Assessment   RLE Assessment WFL   Strength RLE   RLE Overall Strength 3+/5  (grossly)   LLE Assessment   LLE Assessment WFL   Strength LLE   LLE Overall Strength 3+/5  (grossly)   Bed Mobility   Supine to Sit 5  Supervision   Additional items HOB elevated; Increased time required;Verbal cues   Sit to Supine Unable to assess  (pt concluded session sitting in recliner chair OOB)   Transfers   Sit to Stand 4  Minimal assistance   Additional items Assist x 1; Increased time required;Verbal cues  (HHA)   Stand to Sit 4  Minimal assistance   Additional items Assist x 1; Increased time required;Verbal cues  (HHA)   Ambulation/Elevation   Gait pattern Improper Weight shift;Narrow AMOR;Steppage; Foward flexed; Short stride; Ataxia; Excessively slow  (heel walking)   Gait Assistance 3  Moderate assist   Additional items Assist x 1;Verbal cues; Tactile cues   Assistive Device Other (Comment)  (HHA)   Distance 10ft   Stair Management Assistance Not tested   Balance   Static Sitting Fair -   Dynamic Sitting Poor +   Static Standing Fair -   Dynamic Standing Poor +   Ambulatory Poor   Activity Tolerance   Activity Tolerance Patient limited by fatigue;Patient limited by pain   Medical Staff [address] [place] [address]   Nurse Made Aware PT appropriate to be seen and mobilized as per nursing   Assessment   Prognosis Fair   Problem List Decreased strength;Decreased endurance; Impaired balance;Decreased mobility; Decreased coordination;Pain   Assessment Pt is an [de-identified]year old female presenting to [facility] on 12/[facility]/2020 with syncopal episode, nausea, vomiting, and weight loss  CT reveals mass of stomach and lesser sac  Pt admitted with primary diagnosis of melena with anemia, and perigastric mass  Pt is s/p EGD 12/3/2020  PMH is significant for diverticulitis (bowel resection > 20 yrs ago), HLD, chronic back pain, and neuropathy  PT consult for mobility evaluation and d/c planning   Pt presents with decreased strength, impaired balance, decreased endurance, and decreased mobility  Pt performed bed mobility with supervision, requiring increased time to complete and verbal cues for hand placement  Pt performed sit<->stand transfer with min A x1, requiring HHA to maintain balance and increased time to complete transfer  Pt ambulated 10ft with mod A x1 and HHA, demonstrating narrow AMOR, short stride, improper weight shift, and overemphasized heel strike, keeping weight in heels during gait  Pt reports no dizziness/nausea throughout session, but report weakness in LEs during ambulation  Pt concluded session resting comfortably in chair with chair alarm activated, call bell within reach, and all questions answered  Pt would benefit from continued therapy sessions during hospital stay to increase LE strength, improve balance, increase mobility tolerance, and improve endurance to baseline  Recommended d/c to rehab at this time when medically cleared  Barriers to Discharge Decreased caregiver support   Goals   Patient Goals to go home   STG Expiration Date 12/16/20   Short Term Goal #1 In 12 days, pt will be able to: 1) perform bed mobility independently to return to PLOF  2) perform sit<->stand transfer with supervision to decrease level of caregiver support  3) ambulate 150ft with min A x1 to improve ambulation tolerance  4) negotiate 4 steps with mod A x1 in order to navigate home environment  5) improve LE strength by 1/2 grade to increase independence with functional transfers and ambulation deficits  6) improve balance by 1/2 grade to decrease overall fall risk  PT Treatment Day 0   Plan   Treatment/Interventions Functional transfer training;LE strengthening/ROM; Therapeutic exercise; Endurance training;Bed mobility;Gait training;Spoke to nursing;OT;Equipment eval/education   PT Frequency Other (Comment)  (3-5x/wk)   Recommendation   PT Discharge Recommendation Post-Acute Rehabilitation Services  (when medically cleared)   Equipment Recommended Peggy Poe   PT - OK to Discharge Yes  (to rehab when medically cleared)   Modified Ward Scale   Modified Sienna Scale 4   Barthel Index   Feeding 10   Bathing 0   Grooming Score 5   Dressing Score 5   Bladder Score 10   Bowels Score 10   Toilet Use Score 5   Transfers (Bed/Chair) Score 10   Mobility (Level Surface) Score 0   Stairs Score 0   Barthel Index Score 55           Shaun Brody, SPT

## 2020-12-04 NOTE — PROGRESS NOTES
12/04/20 1500   Clinical Encounter Type   Visited With Patient   Sacramental Encounters   Sacrament of Sick-Anointing Anointed

## 2020-12-04 NOTE — QUICK NOTE
Spoke with patient's son Isidra Early) and daughter Christine Rocha)  Provided update on her care  Discussed with him that patient is currently on her way to get biopsy of the mass and that patient is being moved from the ICU later today  Family asked questions and questions were answered  They are grateful for the call       [employer] Student

## 2020-12-04 NOTE — CASE MANAGEMENT
CM met with pt at bedside and introduced self/role with dcp  Pt resides with her  in a 2 story home  Pt reports she has bedroom/bathroom on first floor where she normally does sleep  Pt reports independent with ADLs and ambulation PTA  Pt reports she drives  Retired  No prior hx of VNA, STR, MH, or drug/alcohol abuse  Pharmacy is [facility] -  primary and son, Watson Mccartney secondary  Pt reports Watson Mccartney is first contact though  CM to follow for dcp  CM reviewed d/c planning process including the following: identifying help at home, patient preference for d/c planning needs, Discharge Lounge, [facility] Meds to Bed program, availability of treatment team to discuss questions or concerns patient and/or family may have regarding understanding medications and recognizing signs and symptoms once discharged  CM also encouraged patient to follow up with all recommended appointments after discharge  Patient advised of importance for patient and family to participate in managing patients medical well being

## 2020-12-04 NOTE — ASSESSMENT & PLAN NOTE
Follow up with Biopsy results    Currently on Cefepime/ Flagyl/ micafungin   Oncology and GI following  Ok to advance diet per Surge- Onc

## 2020-12-04 NOTE — PROGRESS NOTES
Progress Note - Surgery Oncology   [facility] [de-identified] y o  female MRN: [id number]  Unit/Bed#: ICU 04 Encounter: [id number]    Assessment:  Pt is an [de-identified] y/o F w melena, wt loss, syncopal event assoc w nausea and abd pain and CT demonstrating incidental 14x 10cm mass in lesser sac/ stomach   12/3 EGD: Patchy erosions in the middle third of the esophagus and lower third of the esophagus  Mass in the body of the stomach and lesser curve of the stomach  Mucosal oozing from overlying mass  Plan:  NPO, d/c NGT, advance diet after IR procedure  IVF  abx  IR for mass bx today, hold am DVT ppx  Out of unit to medicine service    Subjective/Objective   Chief Complaint:     Subjective: REDD  Afebrile  No abdominal pain  Mild nausea  Passing flatus  Objective:     Blood pressure 140/66, pulse 82, temperature 98 °F (36 7 °C), temperature source Oral, resp  rate 16, height 5' (1 524 m), weight 48 1 kg (106 lb), SpO2 97 %  ,Body mass index is 20 7 kg/m²        Intake/Output Summary (Last 24 hours) at 12/4/2020 7573  Last data filed at 12/4/2020 0600  Gross per 24 hour   Intake 3467 08 ml   Output 1725 ml   Net 1742 08 ml       Invasive Devices     Peripheral Intravenous Line            Peripheral IV 12/01/20 Right Antecubital 2 days    Peripheral IV 12/02/20 Right Forearm 2 days          Drain            NG/OG Tube Nasogastric Left nares less than 1 day                Physical Exam: NAD  Atraumatic/normocephalic  AAOX3  NGT in place  Normal mood and affect  Normal respiratory effort  Soft, non tender, ND  Skin warm/dry  Cap refil <2 sec

## 2020-12-04 NOTE — PROGRESS NOTES
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  D/c IVF  F/u final path  Patient will need medical oncology eval  DVT ppx  Primary per medicine- dispo planning      Subjective/Objective   Chief Complaint:     Subjective: REDD  Afebrile  Tolerated clears yesterday, no N/V  No flatus/BM since admission  No abdominal pain  Objective:     Blood pressure 134/60, pulse 82, temperature 98 2 °F (36 8 °C), temperature source Oral, resp  rate (!) 7, height 5' (1 524 m), weight 48 1 kg (106 lb 0 7 oz), SpO2 97 %  ,Body mass index is 20 71 kg/m²        Intake/Output Summary (Last 24 hours) at 12/4/2020 1707  Last data filed at 12/4/2020 1705  Gross per 24 hour   Intake 2850 33 ml   Output 1150 ml   Net 1700 33 ml       Invasive Devices     Peripheral Intravenous Line            Peripheral IV 12/01/20 Right Antecubital 2 days    Peripheral IV 12/02/20 Right Forearm 2 days          Drain            NG/OG Tube Nasogastric Left nares 1 day                Physical Exam: NAD  Atraumatic/normocephalic  AAOX3  Normal mood and affect  Normal respiratory effort  Soft, non tender, ND  Skin warm/dry  Cap refil <2 sec

## 2020-12-04 NOTE — PROGRESS NOTES
STUDENT NOTE  Progress Note    ---------------------------------------------------------------------------------------  HPI/24hr events: Ms Param Sutherland is an [de-identified] yo F with PMH diverticulitis (bowel resection > 20 yrs ago), HLD, chronic back pain, and neuropathy admitted to ICU following presentation to ED for syncopal episode in the context of recent nausea, vomiting, melanna, and 15 lb weight loss  CT reveals 14x10 cm mass of stomach and lesser sac with free fluid and air suggesting perforation  EGD yesterday revealed extrinsic mass compressing lumen of stomach, with no intraluminal mass preventing biopsy  She is tentatively scheduled for IR guided biopsy of mass today  Of note, patient was intermittently tachycardic in the early evening  PRN labetolol and hydralazine added  Patient has been requesting more frequent PRN pain medication   ---------------------------------------------------------------------------------------  SUBJECTIVE  Patient is visited at bedside this morning  She is pleasant and resting comfortably in bed  She endorses fatigue  She continues to endorse dull pain of the left mid back, which is today a 5/10 as well as intermittent nausea and headaches  Patient complains of discomfort of the naris and nasal passage at the site of NG tube  It is exquisitely tender to palpation  Continues to use bedpan for voiding, no problems   Denies chest pain, palpitations, SOB    ---------------------------------------------------------------------------------------    OBJECTIVE     24 Hour Vitals    - Temp: 98 4 ( 1)  - BP: 140/66 (126-197/45-89)  - HR: 82 ()  - RR: 16 (16-20)  - SpO2: 97 ()    Pertinent Laboratory and Diagnostics:  - HgB: 8 7 > 9 0 > 8 8 > 6 9 > 7 6 > 7 8 8 1 > 6 6  - WBCs uptrendin 35 > 13 68 > 11 06 > 9 13 > 12 1 > 13 2  - BUN stable: 19 > 19> 36 > 41  - Cr stable: 0 42 > 0 39>  0 39 > 0 47    Micro  - Blood culture: pending     Imaging:  - EGD: extrinsic mass compressing lumen of stomach; no intraluminal mass       Physical Exam  - General Appearance: thin, pale, fatigued  - Psych: well oriented, not anxious or depressed at this time  - HEENT: tenderness upon palpation of L nare  - CVS  o HR: regular  o Rhythm: regular  o Pulses: +2 in bilateral wrists  - Pulm  o Work of breathing: normal  o Ascultation: clear bilaterally  - Abdominal  o No distention, tenderness, rebound tenderness  o Bowel sounds present  - MSK  o No LE edema    Intake and Output    Intake/Output Summary (Last 24 hours) at 12/4/2020 0704  Last data filed at 12/4/2020 0600  Gross per 24 hour   Intake 3467 08 ml   Output 1725 ml   Net 1742 08 ml    --> 150 mL of output from NG tube    Scheduled Meds:  Current Facility-Administered Medications   Medication Dose Route Frequency Provider Last Rate    cefepime  2,000 mg Intravenous Q8H Abbey Garcia PA-C Stopped (12/04/20 0600)    heparin (porcine)  5,000 Units Subcutaneous [facility] Abbey Garcia PA-C      hydrALAZINE  10 mg Intravenous Q4H PRN Jorge A Farrell MD      HYDROmorphone  0 2 mg Intravenous Q3H PRN Jorge A Farrell MD      HYDROmorphone  0 5 mg Intravenous Q3H PRN Jorge A Farrell MD      HYDROmorphone  0 5 mg Intravenous Q3H PRN Jorge A Farrell MD      iohexol  50 mL Oral 90 min pre-procedure Angela Reis PA-C      Labetalol HCl  10 mg Intravenous Q4H PRN Jorge A Farrell MD      metroNIDAZOLE  500 mg Intravenous Q8H Jorge A Farrell MD Stopped (12/04/20 0013)    micafungin  100 mg Intravenous Q24H Jorge A Farrell MD Stopped (12/04/20 0200)    multi-electrolyte  100 mL/hr Intravenous Continuous Abbey Garcia PA-C 100 mL/hr (12/03/20 1530)    ondansetron  4 mg Intravenous Q4H PRN Jorge A Farrell MD      pantoprazole  40 mg Intravenous Daily Abbey Garcia PA-C      phenol  1 spray Mouth/Throat Q2H PRN Aria Barber PA-C       PRN Meds:    hydrALAZINE    HYDROmorphone    HYDROmorphone    HYDROmorphone    Labetalol HCl    ondansetron    phenol     Allergies   Allergies   Allergen Reactions    Bactrim [Sulfamethoxazole-Trimethoprim]     Simvastatin Myalgia     ---------------------------------------------------------------------------------------    ASSESSMENT AND PLAN    Neuro:   - Diagnosis: delirium prevention  o Regulate sleep wake cycle  o Continue to orient patient   - Diagnosis: pain management  o Lidocaine patch   o Moderate: IV hydromorphone 0 2 mg PRN  o Severe: IV hydromorphone 0 5 mg PRN  o Breakthrough: IV hydromorphone 0 5 mg PRN     Pulm:  - No acute diagnoses  o Nasal cannula PRN for SpO2 <90%  o Encourage incentive spirometry    CVS  - Diagnosis: tachycardia resolved   - continue to resuscitate fluids    GI:   - Diagnosis: NG tube  o Remove  - Diagnosis: 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; 14x10 cm mass of lesser sac with likely perforation  o NPO  o IR guided biopsy tentatively scheduled for today  o Surg Onc, GI following  - Diagnosis: nausea  o onandestron 4 mg IV q4 PRN  - Diagnosis: melena  o Discontinue protonix; likely not GI source      :   - No acute diagnoses  o Continue to monitor I/Os  o Continue to monitor renal function    Fluids/Electrolytes/N:   - Fluids: maintenance fluids (decrease from isolyte 100 mL/hr)  - Electrolytes: Replete electrolytes as needed  - Diet: NPO     Heme/Onc:   - Diagnosis: anemia 2o chronic blood loss  o Approximately stable for past day  o Continue to  Monitor HgB q12 hours  - Diagnosis: DVT prophylaxis  o SCDs  o SQH    Endo:   - No acute diagnoses   - Continue to monitor blood glucose     ID:  - Diagnosis: likely upper GI perforation   -  Trend WBCs   -  Trend temperature   -  Broad spectrum antimicrobials: Cefepime, Flagyl, Micafugin   -  Monitor blood cultures    MSK/Skin:   - Diagnosis: decubitus ulcer prophylaxis   - frequent repositioning    ---------------------------------------------------------------------------------------    Jorden Flores

## 2020-12-04 NOTE — PROGRESS NOTES
Progress Note - Tiffanie Clark 1940, [de-identified] y o  female MRN: [id number]    Unit/Bed#: ICU 04 Encounter: [id number]    Primary Care Provider: Margaret Ding MD   Date and time admitted to hospital: 12/1/2020  7:01 PM        * Intraabdominal mass  Assessment & Plan  Follow up with Biopsy results  Currently on Cefepime/ Flagyl/ micafungin   Oncology and GI following  Ok to advance diet per Surge- Onc    Anemia  Assessment & Plan  Hemoglobin currently 8 7  Continue to monitor H/H  Received 3 Units since admission    Melena  Assessment & Plan  Continue to monitor H/H  GI and Surge- Onc follwoing      Code Status: Level 2 - DNAR: but accepts endotracheal intubation  POA:    POLST:      Reason for ICU admission:   Patient was admitted to the ICU for Hgb monitoring  Intraabdominal mass on CT  Active problems:   Principal Problem:    Intraabdominal mass  Active Problems:    Anemia    Melena    HTN (hypertension)  Resolved Problems:    Gastric perforation (HCC)    Syncope      Consultants:   Surg- Onc  GI    History of Present Illness:    Tiffanie Clark is a [de-identified] y o  female with PMHx of diverticulitis, prior partial bowel resection > 21 y ago w Dr Jordan Carcamo, HLD, back pain, neuropathy, who presents with syncopal episode earlier this afternoon  Pt also endorsed recent episodes of melena, night sweats, and approximately 15 lb weight loss over the past few months  Noted left sided abd pain over the past few months which comes and goes  Noted some dry heaving today, but denied any vomiting  Noted h/o colon cancer in her family  CT imaging demonstrating large 14 x 9 9cm mass involving lesser sac/ stomach assoc w free fluid free air  On arrival pts abd is completely soft, nontender, non distended  Pt is tachycardic, but otherwise stable  Hgb 6 6 on arrival  Pt receiving 1 u prbc tonight   Will plan operative intervention per surgical oncology in the AM  Pt agreeable to intubation but would like to withhold on chest compressions    Summary of clinical course:   Patient was transfused with three units of blood while in the ICU  Her hemoglobin responded well  Surg-Onc and Gi evaluated the patient and obtained imaging  Patient was taken for Biopsy and results are currently pending  Patient remained stable throughout her time in the ICU  Outstanding/pending diagnostics:   Biopsy results    Cultures:   Blood Cultures Negative at 48 hours         Nutrition Plan:   Per surg-onc note advance diet after Biopsy today    Invasive Devices Review  Invasive Devices     Peripheral Intravenous Line            Peripheral IV 12/01/20 Right Antecubital 2 days    Peripheral IV 12/02/20 Right Forearm 2 days          Drain            NG/OG Tube Nasogastric Left nares 1 day                    VTE Pharmacologic Prophylaxis: Heparin  VTE Mechanical Prophylaxis: sequential compression device    Discharge Plan:   Patient should be ready for [facility] on 12/4 after 330        Spoke with Dr Kamaljit Hardin  regarding transfer  Please contact critical care via [employer] with any questions or concerns  Portions of the record may have been created with voice recognition software  Occasional wrong word or "sound a like" substitutions may have occurred due to the inherent limitations of voice recognition software  Read the chart carefully and recognize, using context, where substitutions have occurred

## 2020-12-04 NOTE — PLAN OF CARE
Problem: PHYSICAL THERAPY ADULT  Goal: Performs mobility at highest level of function for planned discharge setting  See evaluation for individualized goals  Description: Treatment/Interventions: Functional transfer training, LE strengthening/ROM, Therapeutic exercise, Endurance training, Bed mobility, Gait training, Spoke to nursing, OT, Equipment eval/education  Equipment Recommended: Peggy Poe       See flowsheet documentation for full assessment, interventions and recommendations  Note: Prognosis: Fair  Problem List: Decreased strength, Decreased endurance, Impaired balance, Decreased mobility, Decreased coordination, Pain  Assessment: Pt is an [de-identified]year old female presenting to [facility] on 12/1/2020 with syncopal episode, nausea, vomiting, and weight loss  CT reveals mass of stomach and lesser sac  Pt admitted with primary diagnosis of melena with anemia, and perigastric mass  Pt is s/p EGD 12/3/2020  PMH is significant for diverticulitis (bowel resection > 20 yrs ago), HLD, chronic back pain, and neuropathy  PT consult for mobility evaluation and d/c planning  Pt presents with decreased strength, impaired balance, decreased endurance, and decreased mobility  Pt performed bed mobility with supervision, requiring increased time to complete and verbal cues for hand placement  Pt performed sit<->stand transfer with min A x1, requiring HHA to maintain balance and increased time to complete transfer  Pt ambulated 10ft with mod A x1 and HHA, demonstrating narrow AMOR, short stride, improper weight shift, and overemphasized heel strike, keeping weight in heels during gait  Pt reports no dizziness/nausea throughout session, but report weakness in LEs during ambulation  Pt concluded session resting comfortably in chair with chair alarm activated, call bell within reach, and all questions answered   Pt would benefit from continued therapy sessions during hospital stay to increase LE strength, improve balance, increase mobility tolerance, and improve endurance to baseline  Recommended d/c to rehab at this time when medically cleared  Barriers to Discharge: Decreased caregiver support     PT Discharge Recommendation: Post-Acute Rehabilitation Services(when medically cleared)     PT - OK to Discharge: Yes(to rehab when medically cleared)    See flowsheet documentation for full assessment

## 2020-12-04 NOTE — PLAN OF CARE
Problem: Prexisting or High Potential for Compromised Skin Integrity  Goal: Skin integrity is maintained or improved  Description: INTERVENTIONS:  - Identify patients at risk for skin breakdown  - Assess and monitor skin integrity  - Assess and monitor nutrition and hydration status  - Monitor labs   - Assess for incontinence   - Turn and reposition patient  - Assist with mobility/ambulation  - Relieve pressure over bony prominences  - Avoid friction and shearing  - Provide appropriate hygiene as needed including keeping skin clean and dry  - Evaluate need for skin moisturizer/barrier cream  - Collaborate with interdisciplinary team   - Patient/family teaching  - Consider wound care consult   Outcome: Progressing     Problem: Potential for Falls  Goal: Patient will remain free of falls  Description: INTERVENTIONS:  - Assess patient frequently for physical needs  -  Identify cognitive and physical deficits and behaviors that affect risk of falls    -  [place] fall precautions as indicated by assessment   - Educate patient/family on patient safety including physical limitations  - Instruct patient to call for assistance with activity based on assessment  - Modify environment to reduce risk of injury  - Consider OT/PT consult to assist with strengthening/mobility  Outcome: Progressing     Problem: PAIN - ADULT  Goal: Verbalizes/displays adequate comfort level or baseline comfort level  Description: Interventions:  - Encourage patient to monitor pain and request assistance  - Assess pain using appropriate pain scale  - Administer analgesics based on type and severity of pain and evaluate response  - Implement non-pharmacological measures as appropriate and evaluate response  - Consider cultural and social influences on pain and pain management  - Notify physician/advanced practitioner if interventions unsuccessful or patient reports new pain  Outcome: Progressing     Problem: INFECTION - ADULT  Goal: Absence or prevention of progression during hospitalization  Description: INTERVENTIONS:  - Assess and monitor for signs and symptoms of infection  - Monitor lab/diagnostic results  - Monitor all insertion sites, i e  indwelling lines, tubes, and drains  - Monitor endotracheal if appropriate and nasal secretions for changes in amount and color  - [place] appropriate cooling/warming therapies per order  - Administer medications as ordered  - Instruct and encourage patient and family to use good hand hygiene technique  - Identify and instruct in appropriate isolation precautions for identified infection/condition  Outcome: Progressing     Problem: SAFETY ADULT  Goal: Patient will remain free of falls  Description: INTERVENTIONS:  - Assess patient frequently for physical needs  -  Identify cognitive and physical deficits and behaviors that affect risk of falls    -  [place] fall precautions as indicated by assessment   - Educate patient/family on patient safety including physical limitations  - Instruct patient to call for assistance with activity based on assessment  - Modify environment to reduce risk of injury  - Consider OT/PT consult to assist with strengthening/mobility  Outcome: Progressing  Goal: Maintain or return to baseline ADL function  Description: INTERVENTIONS:  -  Assess patient's ability to carry out ADLs; assess patient's baseline for ADL function and identify physical deficits which impact ability to perform ADLs (bathing, care of mouth/teeth, toileting, grooming, dressing, etc )  - Assess/evaluate cause of self-care deficits   - Assess range of motion  - Assess patient's mobility; develop plan if impaired  - Assess patient's need for assistive devices and provide as appropriate  - Encourage maximum independence but intervene and supervise when necessary  - Involve family in performance of ADLs  - Assess for home care needs following discharge   - Consider OT consult to assist with ADL evaluation and planning for discharge  - Provide patient education as appropriate  Outcome: Progressing  Goal: Maintain or return mobility status to optimal level  Description: INTERVENTIONS:  - Assess patient's baseline mobility status (ambulation, transfers, stairs, etc )    - Identify cognitive and physical deficits and behaviors that affect mobility  - Identify mobility aids required to assist with transfers and/or ambulation (gait belt, sit-to-stand, lift, walker, cane, etc )  - [place] fall precautions as indicated by assessment  - Record patient progress and toleration of activity level on Mobility SBAR; progress patient to next Phase/Stage  - Instruct patient to call for assistance with activity based on assessment  - Consider rehabilitation consult to assist with strengthening/weightbearing, etc   Outcome: Progressing     Problem: DISCHARGE PLANNING  Goal: Discharge to home or other facility with appropriate resources  Description: INTERVENTIONS:  - Identify barriers to discharge w/patient and caregiver  - Arrange for needed discharge resources and transportation as appropriate  - Identify discharge learning needs (meds, wound care, etc )  - Arrange for interpretive services to assist at discharge as needed  - Refer to Case Management Department for coordinating discharge planning if the patient needs post-hospital services based on physician/advanced practitioner order or complex needs related to functional status, cognitive ability, or social support system  Outcome: Progressing     Problem: Knowledge Deficit  Goal: Patient/family/caregiver demonstrates understanding of disease process, treatment plan, medications, and discharge instructions  Description: Complete learning assessment and assess knowledge base    Interventions:  - Provide teaching at level of understanding  - Provide teaching via preferred learning methods  Outcome: Progressing     Problem: GASTROINTESTINAL - ADULT  Goal: Minimal or absence of nausea and/or vomiting  Description: INTERVENTIONS:  - Administer IV fluids if ordered to ensure adequate hydration  - Maintain NPO status until nausea and vomiting are resolved  - Nasogastric tube if ordered  - Administer ordered antiemetic medications as needed  - Provide nonpharmacologic comfort measures as appropriate  - Advance diet as tolerated, if ordered  - Consider nutrition services referral to assist patient with adequate nutrition and appropriate food choices  Outcome: Progressing  Goal: Maintains or returns to baseline bowel function  Description: INTERVENTIONS:  - Assess bowel function  - Encourage oral fluids to ensure adequate hydration  - Administer IV fluids if ordered to ensure adequate hydration  - Administer ordered medications as needed  - Encourage mobilization and activity  - Consider nutritional services referral to assist patient with adequate nutrition and appropriate food choices  Outcome: Progressing     Problem: METABOLIC, FLUID AND ELECTROLYTES - ADULT  Goal: Electrolytes maintained within normal limits  Description: INTERVENTIONS:  - Monitor labs and assess patient for signs and symptoms of electrolyte imbalances  - Administer electrolyte replacement as ordered  - Monitor response to electrolyte replacements, including repeat lab results as appropriate  - Instruct patient on fluid and nutrition as appropriate  Outcome: Progressing  Goal: Fluid balance maintained  Description: INTERVENTIONS:  - Monitor labs   - Monitor I/O and WT  - Instruct patient on fluid and nutrition as appropriate  - Assess for signs & symptoms of volume excess or deficit  Outcome: Progressing     Problem: HEMATOLOGIC - ADULT  Goal: Maintains hematologic stability  Description: INTERVENTIONS  - Assess for signs and symptoms of bleeding or hemorrhage  - Monitor labs  - Administer supportive blood products/factors as ordered and appropriate  Outcome: Progressing

## 2020-12-04 NOTE — PROGRESS NOTES
Progress Note - Surgical Critical Care   Lamine Moncada [de-identified] y o  female MRN: [id number]  Unit/Bed#: ICU 04 Encounter: [id number]        ----------------------------------------------------------------------------------------  HPI/24hr events: EGD revealed extraluminal mass causing compression on stomach  Pt to go to IR for tissue diagnosis today  Transfused one unit of PRBC for hgb 6 9-> responded to 9 yesterday  Prn hydralazine and labetalol added for intermittent HTN      ---------------------------------------------------------------------------------------  SUBJECTIVE  Feels well  Denied any pain  Denied any fever, chills, chest pain or shortness of breath today  Review of Systems   Constitutional: Negative for chills and fever  HENT: Negative  Eyes: Negative  Respiratory: Negative  Negative for apnea, chest tightness, shortness of breath and wheezing  Cardiovascular: Negative  Gastrointestinal: Negative  Negative for abdominal distention, abdominal pain, constipation, diarrhea, nausea and vomiting  Endocrine: Negative  Genitourinary: Negative  Musculoskeletal: Negative  Skin: Negative  Allergic/Immunologic: Negative  Neurological: Negative  Hematological: Negative  Psychiatric/Behavioral: Negative for behavioral problems       Review of systems was reviewed and negative unless stated above in HPI/24-hour events   ---------------------------------------------------------------------------------------  Assessment and Plan:    Neuro:    Diagnosis: acute pain  o Plan: prn dilaudid   Delirium Precautions  o Plan: CAM ICU, regulate sleep-wake cycle, avoid deliriogenic medications    CV:    Diagnosis: HTN  o Plan: prn labetalol, hydralazine for sytolic > 522    Pulm:   Diagnosis: no active issues  Plan: nasal canula o2 for spo2 <90    GI:    Diagnosis: intrabdominal mass, likely malignant, 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  o Plan: maintain NGT, NPO  o f/u IR bx today for tissue diagnosis   GI PPx  o Plan: protonix    :    Diagnosis: no active issues  o Plan: continue strict I/os, monitor urine output    F/E/N:    F: continue isolyte @ 100cc/h   E: monitor and replete electrolytes PRN    N: npo      Heme/Onc:    Diagnosis: chronic anemia    o Plan: transfuse for hgb <7, monitor hgb   DVT PPx  o Plan: SQH      Endo:    Diagnosis: no h/o DM  o Plan: follow bg, goal 140-180    ID:    Diagnosis: intrabdominal mass, 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 complicated with perf on admission  o Plan: continue cefepime/ flagyl/ micafungin  o Follow up blood cx    MSK/Skin:    Diagnosis: risk for pressure ulcer  o Plan: frequent turning  Disposition: Transfer to Med-Surg   Code Status: Level 2 - DNAR: but accepts endotracheal intubation  ---------------------------------------------------------------------------------------  ICU CORE MEASURES    Prophylaxis   VTE Pharmacologic Prophylaxis: Heparin  VTE Mechanical Prophylaxis: sequential compression device  Stress Ulcer Prophylaxis: Pantoprazole IV     ABCDE Protocol (if indicated)  Plan to perform spontaneous awakening trial today? Not applicable  Plan to perform spontaneous breathing trial today? Not applicable  Obvious barriers to extubation? Not applicable  CAM-ICU: Negative    Invasive Devices Review  Invasive Devices     Peripheral Intravenous Line            Peripheral IV 12/01/20 Right Antecubital 2 days    Peripheral IV 12/02/20 Right Forearm 2 days          Drain            NG/OG Tube Nasogastric Left nares less than 1 day              Can any invasive devices be discontinued today?  Not applicable  ---------------------------------------------------------------------------------------  OBJECTIVE    Vitals   Vitals:    12/04/20 0415 12/04/20 0445 12/04/20 0515 12/04/20 0545   BP: 131/59 141/63 151/65 140/66   Pulse: 94 88 84 82   Resp: (!) 10 14 16 16   Temp: 98 °F (36 7 °C)      TempSrc: Oral      SpO2: 96% 96% 98% 97%   Weight: Height:         Temp (24hrs), Av 7 °F (37 1 °C), Min:98 °F (36 7 °C), Max:100 3 °F (37 9 °C)  Current: Temperature: 98 °F (36 7 °C)  /66   Pulse 82   Temp 98 °F (36 7 °C) (Oral)   Resp 16   Ht 5' (1 524 m)   Wt 48 1 kg (106 lb)   SpO2 97%   BMI 20 70 kg/m²       Physical Exam  Vitals signs and nursing note reviewed  Constitutional:       Appearance: Normal appearance  HENT:      Head: Normocephalic and atraumatic  Nose: Nose normal       Mouth/Throat:      Mouth: Mucous membranes are moist       Pharynx: No oropharyngeal exudate  Eyes:      General: No scleral icterus  Pupils: Pupils are equal, round, and reactive to light  Neck:      Musculoskeletal: Normal range of motion  Cardiovascular:      Rate and Rhythm: Normal rate  Pulmonary:      Effort: Pulmonary effort is normal    Abdominal:      General: There is no distension  Palpations: Abdomen is soft  There is no mass  Tenderness: There is no abdominal tenderness  Hernia: No hernia is present  Genitourinary:     Comments: deferred  Musculoskeletal: Normal range of motion  Skin:     General: Skin is warm  Capillary Refill: Capillary refill takes 2 to 3 seconds  Neurological:      General: No focal deficit present  Mental Status: She is alert and oriented to person, place, and time  Psychiatric:         Mood and Affect: Mood normal        Gen:  No acute distress  HENT: NGT in place     CV:  RRR, WWP  Lungs: Normal WOB on RA  Abd:  Soft, NT/ND  : No active issues  Ext:  No C/C/E  Skin:  Warm/dry, no rashes  Neuro: aaox3     Respiratory:  SpO2: SpO2: 97 %       Invasive/non-invasive ventilation settings   Respiratory    Lab Data (Last 4 hours)    None         O2/Vent Data (Last 4 hours)    None                Laboratory and Diagnostics:  Results from last 7 days   Lab Units 20  0541 20  2212 20  0957 20  0451 20  1750 20  1207 20  0520 20  0012 12/01/20 2009   WBC Thousand/uL 12 35* 13 68* 11 06* 9 13  --   --  12 10*  --  13 20*   HEMOGLOBIN g/dL 8 7* 9 0* 8 8* 6 9* 7 6* 7 8* 8 1*  --  6 6*   HEMATOCRIT % 26 1* 26 7* 26 3* 20 5*  --   --  24 1*  --  21 3*   PLATELETS Thousands/uL 172 152 155 168  --   --  177 221 284   NEUTROS PCT %  --   --   --  76*  --   --  78*  --  84*   MONOS PCT %  --   --   --  10  --   --  10  --  7     Results from last 7 days   Lab Units 12/03/20  0451 12/02/20  0520 12/01/20 2009   SODIUM mmol/L 145 149* 142   POTASSIUM mmol/L 3 8 3 8 4 0   CHLORIDE mmol/L 114* 121* 112*   CO2 mmol/L 25 23 25   ANION GAP mmol/L 6 5 5   BUN mg/dL 19 36* 41*   CREATININE mg/dL 0 39* 0 39* 0 47*   CALCIUM mg/dL 7 8* 7 5* 8 6   GLUCOSE RANDOM mg/dL 98 127 131   ALT U/L  --  12 19   AST U/L  --  9 9   ALK PHOS U/L  --  57 75   ALBUMIN g/dL  --  2 1* 2 7*   TOTAL BILIRUBIN mg/dL  --  0 95 0 22     Results from last 7 days   Lab Units 12/03/20  0451 12/02/20  0520 12/02/20  0012   MAGNESIUM mg/dL 2 1 2 1 2 1   PHOSPHORUS mg/dL 2 6 2 3 2 6      Results from last 7 days   Lab Units 12/02/20  0520 12/01/20  2009   INR  1 22* 1 07      Results from last 7 days   Lab Units 12/01/20 2009   TROPONIN I ng/mL <0 02     Results from last 7 days   Lab Units 12/02/20  0012   LACTIC ACID mmol/L 1 1     ABG:    VBG:          Micro  Results from last 7 days   Lab Units 12/02/20  0152 12/02/20  0151   BLOOD CULTURE  No Growth at 24 hrs  No Growth at 24 hrs  EKG: I have personally reviewed pertinent reports  Imaging: I have personally reviewed pertinent reports  Intake and Output  I/O       12/02 0701 - 12/03 0700 12/03 0701 - 12/04 0700    P  O   0    I V  (mL/kg) 2037 9 (42 8) 1873 8 (39)    Blood      NG/GT 0 80    IV Piggyback 750     Total Intake(mL/kg) 2787 9 (58 6) 1953 8 (40 6)    Urine (mL/kg/hr) 1975 (1 7) 1575 (2)    Emesis/NG output 0 150    Total Output 1975 1725    Net +812 9 +228 8              Height and Weights   Height: 5' (152 4 cm)  IBW: 45 5 kg  Body mass index is 20 7 kg/m²  Weight (last 2 days)     Date/Time   Weight    12/03/20 1305   48 1 (106)    12/03/20 0800   48 4 (106 7)                Nutrition       Diet Orders   (From admission, onward)             Start     Ordered    12/04/20 0001  Diet NPO; Sips with meds  Diet effective midnight     Question Answer Comment   Diet Type NPO    NPO Except: Sips with meds    RD to adjust diet per protocol?  No        12/03/20 7134                  Active Medications  Scheduled Meds:  Current Facility-Administered Medications   Medication Dose Route Frequency Provider Last Rate    cefepime  2,000 mg Intravenous Q8H Rhiannon Mckeon PA-C 2,000 mg (12/04/20 0327)    heparin (porcine)  5,000 Units Subcutaneous [facility] Rhiannon Mckeon PA-C      hydrALAZINE  10 mg Intravenous Q4H PRN Kendra Zurita MD      HYDROmorphone  0 2 mg Intravenous Q3H PRN Kendra Zurita MD      HYDROmorphone  0 5 mg Intravenous Q3H PRN Kendra Zurita MD      HYDROmorphone  0 5 mg Intravenous Q3H PRN Kendra Zurita MD      iohexol  50 mL Oral 90 min pre-procedure Angela Reis PA-C      Labetalol HCl  10 mg Intravenous Q4H PRN Kendra Zurita MD      metroNIDAZOLE  500 mg Intravenous Q8H Kendra Zurita MD Stopped (12/04/20 0013)    micafungin  100 mg Intravenous Q24H Kendra Zurita  mg (12/04/20 0040)    multi-electrolyte  100 mL/hr Intravenous Continuous Rhiannon Mckeon PA-C 100 mL/hr (12/03/20 1530)    ondansetron  4 mg Intravenous Q4H PRN Kendra Zurita MD      pantoprazole  40 mg Intravenous Daily Rhiannon Mckeon PA-C      phenol  1 spray Mouth/Throat Q2H PRN Kerri Barnett PA-C       Continuous Infusions:  multi-electrolyte, 100 mL/hr, Last Rate: 100 mL/hr (12/03/20 1530)      PRN Meds:   hydrALAZINE, 10 mg, Q4H PRN  HYDROmorphone, 0 2 mg, Q3H PRN  HYDROmorphone, 0 5 mg, Q3H PRN  HYDROmorphone, 0 5 mg, Q3H PRN  Labetalol HCl, 10 mg, Q4H PRN  ondansetron, 4 mg, Q4H PRN  phenol, 1 spray, Q2H PRN        Allergies   Allergies   Allergen Reactions    Bactrim [Sulfamethoxazole-Trimethoprim]     Simvastatin Myalgia     ---------------------------------------------------------------------------------------  Advance Directive and Living Will:      Power of :    POLST:    ---------------------------------------------------------------------------------------      Eli Gonzalez MD      Portions of the record may have been created with voice recognition software  Occasional wrong word or "sound a like" substitutions may have occurred due to the inherent limitations of voice recognition software    Read the chart carefully and recognize, using context, where substitutions have occurred

## 2020-12-04 NOTE — PLAN OF CARE
Problem: OCCUPATIONAL THERAPY ADULT  Goal: Performs self-care activities at highest level of function for planned discharge setting  See evaluation for individualized goals  Description: Treatment Interventions: ADL retraining, Functional transfer training, UE strengthening/ROM, Endurance training, Patient/family training, Equipment evaluation/education, Compensatory technique education, Continued evaluation, Energy conservation, Activityengagement          See flowsheet documentation for full assessment, interventions and recommendations  Note: Limitation: Decreased ADL status, Decreased UE strength, Decreased endurance, Decreased self-care trans, Decreased high-level ADLs  Prognosis: Fair  Assessment: Pt is a [de-identified] y/o female seen for OT eval s/p adm to [facility] w/ nausea, syncope and abdominal pain  Pt is dx'd w/ intraabdominal mass  Pt is s/p EGD, planned for IR today for tissue biopsy  Pt  has a past medical history of High cholesterol  Pt with active OT orders and up with assistance  orders  Pt lives with spouse in [address] [place] [address], 1st floor setup, 3-4 LESLI  Pt was I w/  ADLS and IADLS, drove, & required no use of DME PTA  Pt is currently demonstrating the following occupational deficits: S UB ADLS, Min A LB ADLS, Min A transfers and Mod A functional mobility w/ RW  These deficits that are impacting pt's baseline areas of occupation are a result of the following impairments: pain, endurance, activity tolerance, functional mobility, forward functional reach, balance, functional standing tolerance, decreased I w/ ADLS/IADLS, strength and ROM  The following Occupational Performance Areas to address include: grooming, bathing/shower, toilet hygiene, dressing, health maintenance, functional mobility, community mobility, clothing management and household maintenance  Pt scored overall 45/100 on the Barthel Index   Based on the aforementioned OT evaluation, functional performance deficits, and assessments, pt has been identified as a moderate complexity evaluation  Recommend STR upon D/C, when medically stable  Pending progress and LOS, may progress to Home w/ Home OT and increased family support   Pt to continue to benefit from acute immediate OT services to address the following goals 3-5x/week to  w/in 10-14 days:      OT Discharge Recommendation: Post-Acute Rehabilitation Services  OT - OK to Discharge: Yes(when medically stable)     Jayant Zheng MS, OTR/L

## 2020-12-05 PROBLEM — R65.10 SIRS (SYSTEMIC INFLAMMATORY RESPONSE SYNDROME) (HCC): Status: ACTIVE | Noted: 2020-12-05

## 2020-12-05 PROBLEM — T80.1XXA IV INFILTRATE, INITIAL ENCOUNTER: Status: ACTIVE | Noted: 2020-12-05

## 2020-12-05 PROBLEM — E87.6 HYPOKALEMIA: Status: ACTIVE | Noted: 2020-12-05

## 2020-12-05 LAB
ANION GAP SERPL CALCULATED.3IONS-SCNC: 6 MMOL/L (ref 4–13)
BUN SERPL-MCNC: 17 MG/DL (ref 5–25)
CALCIUM SERPL-MCNC: 8 MG/DL (ref 8.3–10.1)
CHLORIDE SERPL-SCNC: 111 MMOL/L (ref 100–108)
CO2 SERPL-SCNC: 26 MMOL/L (ref 21–32)
CREAT SERPL-MCNC: 0.35 MG/DL (ref 0.6–1.3)
ERYTHROCYTE [DISTWIDTH] IN BLOOD BY AUTOMATED COUNT: 15.1 % (ref 11.6–15.1)
GFR SERPL CREATININE-BSD FRML MDRD: 103 ML/MIN/1.73SQ M
GLUCOSE SERPL-MCNC: 118 MG/DL (ref 65–140)
GLUCOSE SERPL-MCNC: 120 MG/DL (ref 65–140)
GLUCOSE SERPL-MCNC: 126 MG/DL (ref 65–140)
GLUCOSE SERPL-MCNC: 127 MG/DL (ref 65–140)
GLUCOSE SERPL-MCNC: 134 MG/DL (ref 65–140)
HCT VFR BLD AUTO: 23.1 % (ref 34.8–46.1)
HCT VFR BLD AUTO: 26.1 % (ref 34.8–46.1)
HGB BLD-MCNC: 7.4 G/DL (ref 11.5–15.4)
HGB BLD-MCNC: 8.5 G/DL (ref 11.5–15.4)
MAGNESIUM SERPL-MCNC: 2.3 MG/DL (ref 1.6–2.6)
MCH RBC QN AUTO: 29.4 PG (ref 26.8–34.3)
MCHC RBC AUTO-ENTMCNC: 32 G/DL (ref 31.4–37.4)
MCV RBC AUTO: 92 FL (ref 82–98)
PHOSPHATE SERPL-MCNC: 1.5 MG/DL (ref 2.3–4.1)
PLATELET # BLD AUTO: 170 THOUSANDS/UL (ref 149–390)
PMV BLD AUTO: 10.5 FL (ref 8.9–12.7)
POTASSIUM SERPL-SCNC: 3.4 MMOL/L (ref 3.5–5.3)
RBC # BLD AUTO: 2.52 MILLION/UL (ref 3.81–5.12)
SODIUM SERPL-SCNC: 143 MMOL/L (ref 136–145)
WBC # BLD AUTO: 8.72 THOUSAND/UL (ref 4.31–10.16)

## 2020-12-05 PROCEDURE — 99233 SBSQ HOSP IP/OBS HIGH 50: CPT | Performed by: PHYSICIAN ASSISTANT

## 2020-12-05 PROCEDURE — 99232 SBSQ HOSP IP/OBS MODERATE 35: CPT | Performed by: STUDENT IN AN ORGANIZED HEALTH CARE EDUCATION/TRAINING PROGRAM

## 2020-12-05 PROCEDURE — 82948 REAGENT STRIP/BLOOD GLUCOSE: CPT

## 2020-12-05 PROCEDURE — 83735 ASSAY OF MAGNESIUM: CPT | Performed by: PHYSICIAN ASSISTANT

## 2020-12-05 PROCEDURE — 85027 COMPLETE CBC AUTOMATED: CPT | Performed by: PHYSICIAN ASSISTANT

## 2020-12-05 PROCEDURE — 84100 ASSAY OF PHOSPHORUS: CPT | Performed by: PHYSICIAN ASSISTANT

## 2020-12-05 PROCEDURE — 80048 BASIC METABOLIC PNL TOTAL CA: CPT | Performed by: PHYSICIAN ASSISTANT

## 2020-12-05 PROCEDURE — 99232 SBSQ HOSP IP/OBS MODERATE 35: CPT | Performed by: INTERNAL MEDICINE

## 2020-12-05 PROCEDURE — 85018 HEMOGLOBIN: CPT | Performed by: STUDENT IN AN ORGANIZED HEALTH CARE EDUCATION/TRAINING PROGRAM

## 2020-12-05 PROCEDURE — C9113 INJ PANTOPRAZOLE SODIUM, VIA: HCPCS | Performed by: PHYSICIAN ASSISTANT

## 2020-12-05 PROCEDURE — 85014 HEMATOCRIT: CPT | Performed by: STUDENT IN AN ORGANIZED HEALTH CARE EDUCATION/TRAINING PROGRAM

## 2020-12-05 RX ORDER — OXYCODONE HYDROCHLORIDE 5 MG/1
5 TABLET ORAL EVERY 4 HOURS PRN
Status: DISCONTINUED | OUTPATIENT
Start: 2020-12-05 | End: 2020-12-07 | Stop reason: HOSPADM

## 2020-12-05 RX ORDER — PANTOPRAZOLE SODIUM 40 MG/1
40 TABLET, DELAYED RELEASE ORAL
Status: DISCONTINUED | OUTPATIENT
Start: 2020-12-06 | End: 2020-12-07 | Stop reason: HOSPADM

## 2020-12-05 RX ORDER — POTASSIUM CHLORIDE 20 MEQ/1
40 TABLET, EXTENDED RELEASE ORAL ONCE
Status: COMPLETED | OUTPATIENT
Start: 2020-12-05 | End: 2020-12-05

## 2020-12-05 RX ORDER — HYDROMORPHONE HCL/PF 1 MG/ML
0.2 SYRINGE (ML) INJECTION
Status: DISCONTINUED | OUTPATIENT
Start: 2020-12-05 | End: 2020-12-07 | Stop reason: HOSPADM

## 2020-12-05 RX ORDER — CEFAZOLIN SODIUM 2 G/50ML
2000 SOLUTION INTRAVENOUS EVERY 8 HOURS
Status: DISCONTINUED | OUTPATIENT
Start: 2020-12-05 | End: 2020-12-07 | Stop reason: HOSPADM

## 2020-12-05 RX ORDER — OXYCODONE HYDROCHLORIDE 5 MG/1
2.5 TABLET ORAL EVERY 4 HOURS PRN
Status: DISCONTINUED | OUTPATIENT
Start: 2020-12-05 | End: 2020-12-07 | Stop reason: HOSPADM

## 2020-12-05 RX ADMIN — POTASSIUM CHLORIDE 40 MEQ: 1500 TABLET, EXTENDED RELEASE ORAL at 17:11

## 2020-12-05 RX ADMIN — CEFAZOLIN SODIUM 2000 MG: 2 SOLUTION INTRAVENOUS at 17:11

## 2020-12-05 RX ADMIN — HEPARIN SODIUM 5000 UNITS: 5000 INJECTION INTRAVENOUS; SUBCUTANEOUS at 05:14

## 2020-12-05 RX ADMIN — METRONIDAZOLE 500 MG: 500 INJECTION, SOLUTION INTRAVENOUS at 00:37

## 2020-12-05 RX ADMIN — HEPARIN SODIUM 5000 UNITS: 5000 INJECTION INTRAVENOUS; SUBCUTANEOUS at 14:55

## 2020-12-05 RX ADMIN — METRONIDAZOLE 500 MG: 500 INJECTION, SOLUTION INTRAVENOUS at 08:20

## 2020-12-05 RX ADMIN — CEFEPIME HYDROCHLORIDE 2000 MG: 2 INJECTION, POWDER, FOR SOLUTION INTRAVENOUS at 04:32

## 2020-12-05 RX ADMIN — MICAFUNGIN SODIUM 100 MG: 50 INJECTION, POWDER, LYOPHILIZED, FOR SOLUTION INTRAVENOUS at 02:57

## 2020-12-05 RX ADMIN — DEXTROSE AND SODIUM CHLORIDE 75 ML/HR: 5; .45 INJECTION, SOLUTION INTRAVENOUS at 04:39

## 2020-12-05 RX ADMIN — CEFEPIME HYDROCHLORIDE 2000 MG: 2 INJECTION, POWDER, FOR SOLUTION INTRAVENOUS at 12:23

## 2020-12-05 RX ADMIN — OXYCODONE HYDROCHLORIDE 5 MG: 5 TABLET ORAL at 17:11

## 2020-12-05 RX ADMIN — PANTOPRAZOLE SODIUM 40 MG: 40 INJECTION, POWDER, FOR SOLUTION INTRAVENOUS at 08:20

## 2020-12-05 NOTE — ASSESSMENT & PLAN NOTE
· Left arm antecubital IV infiltrate with surrounding erythema and swelling  · Will keep on IV Ancef  · Warm compresses  · Monitor daily

## 2020-12-05 NOTE — ASSESSMENT & PLAN NOTE
· POA with leukocytosis and tachycardia  · Likely secondary to perigastric mass  · Initially there was concern for perforation, but this was ruled out on repeat CT scan  · Will discontinue Cefepime, Flagyl, and Micofungin

## 2020-12-05 NOTE — ASSESSMENT & PLAN NOTE
· S/p EUS with FNA on 12/4  · Preliminary pathology suggestive of GIST  · Surgical Oncology following  · Can get Med/Onc input once pathology available

## 2020-12-05 NOTE — ASSESSMENT & PLAN NOTE
· Suspect oozing from gastric mass  · Hemoglobin this morning trended down, but repeat hemoglobin this afternoon is stable  · Monitor closely  · Supportive care with blood transfusions as needed  · GI following, unfortunately there is not a great option for endoscopic hemostasis  · On clear liquid diet  · PPI

## 2020-12-05 NOTE — PROGRESS NOTES
Progress Note - [facility]  Alfred Rodgers [de-identified] y o  female MRN: [id number]  Unit/Bed#: [facility] 909-01 Encounter: [id number]      ASSESSMENT AND PLAN:    1  Melena, anemia - suspect source is mild mucosal oozing from gastric wall overlying the mass which was evident on endoscopy  No niyah bleeding currently although hemoglobin continues to decrease  Hemoglobin 7 4 this morning, down from 8 7 yesterday  · Unfortunately, there is not a great option for endoscopic hemostasis for the source of bleeding; could consider Hemospray although this would likely be a temporizing option  · Uncertain if radiation by Radiation Oncology would be an option to address bleeding although may want to wait until confirmation of pathology for mass before pursuing this  · If there is any evidence of niyah bleeding and/or clinical deterioration secondary to blood loss, can pursue repeat urgent endoscopy at that time  · For now, continue conservative management  · Repeat H&H this afternoon  · Recommend transfusing blood for hemoglobin less than 7  · Okay to start clear liquid diet now  · Continue PPI  · Avoid NSAIDs    2  Perigastric mass, 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 on 12/4  Noted to have a large, a 0 5 cm gastric submucosal mass, appearing to arise from the muscularis propria with preliminary cytology revealing stromal cells, 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  · Surgical oncology following; await final pathology  · Agree with medical oncology evaluation per surgery recommendations  · Follow clinically    3  SIRS of non-infectious origin without acute organ dysfunction - present on arrival   Likely secondary to perigastric mass  Evidenced by leukocytosis and tachycardia  Currently on antibiotics  · Monitor WBC and fever trends  · Continue supportive care per primary team    ______________________________________________________________________    SUBJECTIVE:     Patient seen and evaluated at bedside  Denies any abdominal pain currently although she does admit to ongoing nausea  Was able to tolerate clear liquids yesterday  Does not have much of an appetite currently due to the nausea  Has not had bowel movement overnight  No niyah bleeding      Medication Administration - last 24 hours from 12/04/2020 0825 to 12/05/2020 0825       Date/Time Order Dose Route Action Action by     12/04/2020 1000 multi-electrolyte (PLASMALYTE-A/ISOLYTE-S PH 7 4) IV solution 0 mL/hr Intravenous Stopped Ezella Shield, RN     12/04/2020 4040 multi-electrolyte (PLASMALYTE-A/ISOLYTE-S PH 7 4) IV solution 0 mL/hr Intravenous Stopped Ezella Shield, RN     12/05/2020 0820 metroNIDAZOLE (FLAGYL) IVPB (premix) 500 mg 100 mL 500 mg Intravenous New Bag Cindi Dilts, RN     12/05/2020 2985 metroNIDAZOLE (FLAGYL) IVPB (premix) 500 mg 100 mL 0 mg Intravenous Stopped [facility]     12/05/2020 0037 metroNIDAZOLE (FLAGYL) IVPB (premix) 500 mg 100 mL 500 mg Intravenous New Bag [facility]     12/04/2020 1944 metroNIDAZOLE (FLAGYL) IVPB (premix) 500 mg 100 mL   Intravenous MAR Unhold [facility]     12/04/2020 1926 metroNIDAZOLE (FLAGYL) IVPB (premix) 500 mg 100 mL   Intravenous MAR Hold Automatic Transfer Provider     12/04/2020 1705 metroNIDAZOLE (FLAGYL) IVPB (premix) 500 mg 100 mL 0 mg Intravenous Stopped Ezella Shield, RN     12/04/2020 1550 metroNIDAZOLE (FLAGYL) IVPB (premix) 500 mg 100 mL 500 mg Intravenous New Bag Ezella Shield, RN     12/04/2020 1000 metroNIDAZOLE (FLAGYL) IVPB (premix) 500 mg 100 mL 0 mg Intravenous Stopped Ezella Shield, RN     12/04/2020 2260 metroNIDAZOLE (FLAGYL) IVPB (premix) 500 mg 100 mL 500 mg Intravenous New Bag Ezella Shield, RN     12/05/2020 0358 micafungin (MYCAMINE) 100 mg in sodium chloride 0 9 % 100 mL IVPB 0 mg Intravenous Stopped Victoria Slough     12/05/2020 0257 micafungin (MYCAMINE) 100 mg in sodium chloride 0 9 % 100 mL IVPB 100 mg [facility]ous [facility]     12/04/2020 1944 micafungin (MYCAMINE) 100 mg in sodium chloride 0 9 % 100 mL IVPB   Intravenous MAR Unhold [place]     12/04/2020 1926 micafungin (MYCAMINE) 100 mg in sodium chloride 0 9 % 100 mL IVPB   Intravenous MAR Hold Automatic Transfer Provider     12/04/2020 1944 HYDROmorphone (DILAUDID) injection 0 2 mg   Intravenous MAR Unhold Victoria [place]     12/04/2020 1926 HYDROmorphone (DILAUDID) injection 0 2 mg   Intravenous MAR Hold Automatic Transfer Provider     12/04/2020 1830 HYDROmorphone (DILAUDID) injection 0 2 mg 0 2 mg Intravenous Given Ezella Shield, RN     12/04/2020 0480 HYDROmorphone (DILAUDID) injection 0 2 mg 0 2 mg Intravenous Given Ezella Shield, RN     12/04/2020 1944 HYDROmorphone (DILAUDID) injection 0 5 mg   Intravenous MAR Stan [place]     12/04/2020 1926 HYDROmorphone (DILAUDID) injection 0 5 mg   Intravenous MAR Hold Automatic Transfer Provider     12/04/2020 1944 HYDROmorphone (DILAUDID) injection 0 5 mg   Intravenous MAR Latty [place]     12/04/2020 1926 HYDROmorphone (DILAUDID) injection 0 5 mg   Intravenous MAR Hold Automatic Transfer Provider     12/04/2020 1944 ondansetron (ZOFRAN) injection 4 mg   Intravenous MAR Unhold [place]     12/04/2020 1926 ondansetron (ZOFRAN) injection 4 mg   Intravenous MAR Hold Automatic Transfer Provider     12/04/2020 1551 ondansetron (ZOFRAN) injection 4 mg 4 mg Intravenous Given Ezella Shield, RN     12/04/2020 0846 ondansetron (ZOFRAN) injection 4 mg 4 mg Intravenous Given Ezella Shield, RN     12/04/2020 1926 iohexol (OMNIPAQUE) 240 MG/ML solution 50 mL   Oral MAR Hold Automatic Transfer Provider     12/04/2020 1944 phenol (CHLORASEPTIC) 1 4 % mucosal liquid 1 spray   Mouth/Throat MAR Unhold [place]     12/04/2020 1926 phenol (CHLORASEPTIC) 1 4 % mucosal liquid 1 spray   Mouth/Throat MAR Hold Automatic Transfer Provider     12/05/2020 0514 heparin (porcine) subcutaneous injection 5,000 Units 5,000 Units Subcutaneous Given Laura L Silvia Rolle     12/04/2020 2140 heparin (porcine) subcutaneous injection 5,000 Units 5,000 Units Subcutaneous Given Aurora Jorge     12/04/2020 1944 heparin (porcine) subcutaneous injection 5,000 Units   Subcutaneous MAR Unhold Aurora Jorge     12/04/2020 1926 heparin (porcine) subcutaneous injection 5,000 Units   Subcutaneous MAR Hold Automatic Transfer Provider     12/04/2020 1434 heparin (porcine) subcutaneous injection 5,000 Units 5,000 Units Subcutaneous Given Mae Madrigal RN     12/05/2020 0432 cefepime (MAXIPIME) 2 g/50 mL dextrose IVPB 2,000 mg Intravenous New Bag Aurora Jorge     12/04/2020 2240 cefepime (MAXIPIME) 2 g/50 mL dextrose IVPB 0 mg Intravenous Stopped Aurora Diazi     12/04/2020 2140 cefepime (MAXIPIME) 2 g/50 mL dextrose IVPB 2,000 mg Intravenous New Bag Aurora Patel     12/04/2020 1944 cefepime (MAXIPIME) 2 g/50 mL dextrose IVPB   Intravenous MAR Unhold Aurora Patel     12/04/2020 1926 cefepime (MAXIPIME) 2 g/50 mL dextrose IVPB   Intravenous MAR Hold Automatic Transfer Provider     12/04/2020 1330 cefepime (MAXIPIME) 2 g/50 mL dextrose IVPB 0 mg Intravenous Stopped Mae Madrigal RN     12/04/2020 1258 cefepime (MAXIPIME) 2 g/50 mL dextrose IVPB 2,000 mg Intravenous New Bag Mae Madrigal RN     12/05/2020 0820 pantoprazole (PROTONIX) injection 40 mg 40 mg Intravenous Given Cindi Ly RN     12/04/2020 1944 pantoprazole (PROTONIX) injection 40 mg   Intravenous MAR Unhold Aurora Patel     12/04/2020 1926 pantoprazole (PROTONIX) injection 40 mg   Intravenous MAR Hold Automatic Transfer Provider     12/04/2020 0846 pantoprazole (PROTONIX) injection 40 mg 40 mg Intravenous Given Mae Madrigal RN     12/04/2020 1944 Labetalol HCl (NORMODYNE) injection 10 mg   Intravenous MAR Namita Herron     12/04/2020 1926 Labetalol HCl (NORMODYNE) injection 10 mg   Intravenous MAR Hold Automatic Transfer Provider     12/04/2020 1944 hydrALAZINE (APRESOLINE) injection 10 mg   Intravenous MAR Vanesa Holland     12/04/2020 1926 hydrALAZINE (APRESOLINE) injection 10 mg   Intravenous MAR Hold Automatic Transfer Provider     12/05/2020 0820 lidocaine (LIDODERM) 5 % patch 1 patch 1 patch Topical Medication Applied Cindi Ly RN     12/05/2020 0035 lidocaine (LIDODERM) 5 % patch 1 patch 1 patch Topical Patch Removed Herb Sender     12/04/2020 1944 lidocaine (LIDODERM) 5 % patch 1 patch   Topical MAR Unhold Herb Sender     12/04/2020 1926 lidocaine (LIDODERM) 5 % patch 1 patch   Topical MAR Hold Automatic Transfer Provider     12/04/2020 1300 lidocaine (LIDODERM) 5 % patch 1 patch 1 patch Topical Medication Applied Ced Galloway RN     12/05/2020 0439 dextrose 5 % and sodium chloride 0 45 % infusion 75 mL/hr Intravenous New Bag Herb Sender     12/04/2020 1705 dextrose 5 % and sodium chloride 0 45 % infusion 75 mL/hr Intravenous Restarted eCd Galloway RN     12/04/2020 1551 dextrose 5 % and sodium chloride 0 45 % infusion 0 mL/hr Intravenous Wayne Weston RN     12/04/2020 1330 dextrose 5 % and sodium chloride 0 45 % infusion 75 mL/hr Intravenous Restarted Ced Galloway RN     12/04/2020 1259 dextrose 5 % and sodium chloride 0 45 % infusion 0 mL/hr Intravenous Stopped Ced Galloway RN     12/04/2020 1254 dextrose 5 % and sodium chloride 0 45 % infusion 75 mL/hr Intravenous [address] Ced Galloway, [address] [place] [address]     12/04/2020 1705 potassium chloride 20 mEq IVPB (premix) 0 mEq Intravenous Stopped Ced Gallowya RN     12/04/2020 1434 potassium chloride 20 mEq IVPB (premix) 20 mEq Intravenous [address] Ced Galloway, [address] [place] [address]     12/04/2020 1433 potassium chloride 20 mEq IVPB (premix) 0 mEq Intravenous Stopped Ced Galloway RN     12/04/2020 1302 potassium chloride 20 mEq IVPB (premix) 20 mEq Intravenous New Bag Ced Galloway RN          OBJECTIVE:     Objective   Blood pressure 138/62, pulse 83, temperature 98 3 °F (36 8 °C), resp   rate 18, height 5' (1 524 m), weight 48 1 kg (106 lb 0 7 oz), SpO2 95 %  Body mass index is 20 71 kg/m²  Intake/Output Summary (Last 24 hours) at 12/5/2020 0825  Last data filed at 12/5/2020 5313  Gross per 24 hour   Intake 2182 5 ml   Output 700 ml   Net 1482 5 ml       PHYSICAL EXAM:   General Appearance: Awake and alert, in no acute distress  Abdomen: Soft, non-tender, non-distended; bowel sounds normal; no masses or no organomegaly    Invasive Devices     Peripheral Intravenous Line            Peripheral IV 12/01/20 Right Antecubital 3 days    Peripheral IV 12/02/20 Right Forearm 3 days          Drain            NG/OG Tube Nasogastric Left nares 1 day                LAB RESULTS:  No results displayed because visit has over 200 results  RADIOLOGY RESULTS: I have personally reviewed pertinent imaging studies  DEBORAH Villeda  Gastroenterology Fellow  [facility]  Available on [email or web address]

## 2020-12-05 NOTE — PROGRESS NOTES
Progress Note - Emily Domínguez 1940, [de-identified] y o  female MRN: [id number]    Unit/Bed#: [facility] 909-01 Encounter: [id number]    Primary Care Provider: Porter Brian MD   Date and time admitted to hospital: 12/1/2020  7:01 PM         SIRS (systemic inflammatory response syndrome) ([facility] [address] )  Assessment & Plan  · POA with leukocytosis and tachycardia  · Likely secondary to perigastric mass  · Initially there was concern for perforation, but this was ruled out on repeat CT scan  · Will discontinue Cefepime, Flagyl, and Micofungin    * Intraabdominal mass  Assessment & Plan  · S/p EUS with FNA on 12/4  · Preliminary pathology suggestive of GIST  · Surgical Oncology following  · Can get Med/Onc input once pathology available    Melena  Assessment & Plan  · Suspect oozing from gastric mass  · Hemoglobin this morning trended down, but repeat hemoglobin this afternoon is stable  · Monitor closely  · Supportive care with blood transfusions as needed  · GI following, unfortunately there is not a great option for endoscopic hemostasis  · On clear liquid diet  · PPI    Anemia  Assessment & Plan  · See above monitor    HTN (hypertension)  Assessment & Plan  · Not on blood pressure meds at home  · P r n  Labetalol and hydralazine as needed for elevated blood pressure  · Monitor for now, may need to consider adding an oral antihypertensive agent    IV infiltrate, initial encounter  Assessment & Plan  · Left arm antecubital IV infiltrate with surrounding erythema and swelling  · Will keep on IV Ancef  · Warm compresses  · Monitor daily    Hypokalemia  Assessment & Plan  · Replace      VTE Pharmacologic Prophylaxis:   Pharmacologic: Pharmacologic VTE Prophylaxis contraindicated due to Possible oozing from gastric mass  Mechanical VTE Prophylaxis in Place: Yes    Patient Centered Rounds: I have performed bedside rounds with nursing staff today      Discussions with Specialists or Other Care Team Provider: GI    Education and Discussions with Family / Patient: patient, son called with update    Time Spent for Care: 45 minutes  More than 50% of total time spent on counseling and coordination of care as described above  Time spent reviewing chart, discussing plan of care with son    Current Length of Stay: 4 day(s)    Current Patient Status: Inpatient   Certification Statement: The patient will continue to require additional inpatient hospital stay due to see above    Discharge Plan: will need rehab    Code Status: Level 2 - DNAR: but accepts endotracheal intubation      Subjective:   Arms are sore  Had a few sips of ginger ale  Does not feel like eating    Objective:     Vitals:   Temp (24hrs), Av 6 °F (37 °C), Min:98 2 °F (36 8 °C), Max:99 3 °F (37 4 °C)    Temp:  [98 2 °F (36 8 °C)-99 3 °F (37 4 °C)] 98 2 °F (36 8 °C)  HR:  [78-92] 92  Resp:  [7-19] 16  BP: (113-168)/(55-88) 168/88  SpO2:  [95 %-98 %] 95 %  Body mass index is 20 71 kg/m²  Input and Output Summary (last 24 hours): Intake/Output Summary (Last 24 hours) at 2020 1554  Last data filed at 2020 1300  Gross per 24 hour   Intake 1772 5 ml   Output 350 ml   Net 1422 5 ml       Physical Exam:     Physical Exam  Constitutional:       Comments: thin   Cardiovascular:      Rate and Rhythm: Normal rate and regular rhythm  Heart sounds: No murmur  Pulmonary:      Effort: Pulmonary effort is normal       Breath sounds: Normal breath sounds  Abdominal:      General: Bowel sounds are normal  There is no distension  Palpations: Abdomen is soft  Tenderness: There is no abdominal tenderness  Skin:     General: Skin is warm and dry  Comments: Left arm swollen and red in the antecubital area extending to mid forearm to lower upper arm   Neurological:      General: No focal deficit present  Mental Status: She is alert and oriented to person, place, and time     Psychiatric:         Mood and Affect: Mood normal          Additional Data: Labs:    Results from last 7 days   Lab Units 12/05/20  1258 12/05/20  0509  12/03/20  0451   WBC Thousand/uL  --  8 72   < > 9 13   HEMOGLOBIN g/dL 8 5* 7 4*   < > 6 9*   HEMATOCRIT % 26 1* 23 1*   < > 20 5*   PLATELETS Thousands/uL  --  170   < > 168   NEUTROS PCT %  --   --   --  76*   LYMPHS PCT %  --   --   --  12*   MONOS PCT %  --   --   --  10   EOS PCT %  --   --   --  1    < > = values in this interval not displayed  Results from last 7 days   Lab Units 12/05/20  0509  12/02/20  0520   SODIUM mmol/L 143   < > 149*   POTASSIUM mmol/L 3 4*   < > 3 8   CHLORIDE mmol/L 111*   < > 121*   CO2 mmol/L 26   < > 23   BUN mg/dL 17   < > 36*   CREATININE mg/dL 0 35*   < > 0 39*   ANION GAP mmol/L 6   < > 5   CALCIUM mg/dL 8 0*   < > 7 5*   ALBUMIN g/dL  --   --  2 1*   TOTAL BILIRUBIN mg/dL  --   --  0 95   ALK PHOS U/L  --   --  57   ALT U/L  --   --  12   AST U/L  --   --  9   GLUCOSE RANDOM mg/dL 118   < > 127    < > = values in this interval not displayed  Results from last 7 days   Lab Units 12/02/20  0520   INR  1 22*     Results from last 7 days   Lab Units 12/05/20  1202 12/04/20  2345 12/04/20  1808 12/04/20  0522 12/03/20  2339 12/03/20  1719 12/03/20  1143 12/02/20  2347 12/02/20  1713 12/02/20  1219   POC GLUCOSE mg/dl 120 140 115 134 111 100 91 103 94 111         Results from last 7 days   Lab Units 12/02/20  0012   LACTIC ACID mmol/L 1 1           * I Have Reviewed All Lab Data Listed Above  * Additional Pertinent Lab Tests Reviewed: [address] Admission Reviewed    Imaging:    Imaging Reports Reviewed Today Include: CT abd/pelvis  Imaging Personally Reviewed by Myself Includes:  none    Recent Cultures (last 7 days):     Results from last 7 days   Lab Units 12/02/20  0152 12/02/20  0151   BLOOD CULTURE  No Growth at 72 hrs  No Growth at 72 hrs         Last 24 Hours Medication List:   Current Facility-Administered Medications   Medication Dose Route Frequency Provider Last Rate    cefazolin  2,000 mg Intravenous Q8H Renea Cooper PA-C      dextrose 5 % and sodium chloride 0 45 %  75 mL/hr Intravenous Continuous Marilynn Apgar, PA-C 75 mL/hr (20 0439)    heparin (porcine)  5,000 Units Subcutaneous Q8H [address] Parth Arriaga PA-C      hydrALAZINE  10 mg Intravenous Q4H PRN Marilynn Apgar, PA-C      HYDROmorphone  0 2 mg Intravenous Q3H PRN Amaury Reich PA-C      iohexol  50 mL Oral 90 min pre-procedure Marilynn Apgar, PA-C      Labetalol HCl  10 mg Intravenous Q4H PRN Marilynn Apgar, PA-C      lidocaine  1 patch Topical Daily Marilynn Apgar, PA-C      ondansetron  4 mg Intravenous Q4H PRN Marilynn Apgar, PA-C      oxyCODONE  2 5 mg Oral Q4H PRN Amaury Reich PA-C      oxyCODONE  5 mg Oral Q4H PRN Amaury Reich PA-C      [START ON 2020] pantoprazole  40 mg Oral Early Morning Amaury Reich PA-C      phenol  1 spray Mouth/Throat Q2H PRN Marilynn Apgar, PA-C      potassium chloride  40 mEq Oral Once Amaury Reich PA-C          Today, Patient Was Seen By: Amaury Reich PA-C    ** Please Note: Dictation voice to text software may have been used in the creation of this document   **

## 2020-12-06 LAB
ANION GAP SERPL CALCULATED.3IONS-SCNC: 6 MMOL/L (ref 4–13)
BUN SERPL-MCNC: 9 MG/DL (ref 5–25)
CALCIUM SERPL-MCNC: 8.3 MG/DL (ref 8.3–10.1)
CHLORIDE SERPL-SCNC: 110 MMOL/L (ref 100–108)
CO2 SERPL-SCNC: 26 MMOL/L (ref 21–32)
CREAT SERPL-MCNC: 0.31 MG/DL (ref 0.6–1.3)
ERYTHROCYTE [DISTWIDTH] IN BLOOD BY AUTOMATED COUNT: 15.4 % (ref 11.6–15.1)
GFR SERPL CREATININE-BSD FRML MDRD: 107 ML/MIN/1.73SQ M
GLUCOSE SERPL-MCNC: 103 MG/DL (ref 65–140)
GLUCOSE SERPL-MCNC: 110 MG/DL (ref 65–140)
GLUCOSE SERPL-MCNC: 112 MG/DL (ref 65–140)
GLUCOSE SERPL-MCNC: 97 MG/DL (ref 65–140)
HCT VFR BLD AUTO: 26.4 % (ref 34.8–46.1)
HGB BLD-MCNC: 8.1 G/DL (ref 11.5–15.4)
MCH RBC QN AUTO: 29.2 PG (ref 26.8–34.3)
MCHC RBC AUTO-ENTMCNC: 30.7 G/DL (ref 31.4–37.4)
MCV RBC AUTO: 95 FL (ref 82–98)
PLATELET # BLD AUTO: 172 THOUSANDS/UL (ref 149–390)
PMV BLD AUTO: 10.4 FL (ref 8.9–12.7)
POTASSIUM SERPL-SCNC: 3.4 MMOL/L (ref 3.5–5.3)
RBC # BLD AUTO: 2.77 MILLION/UL (ref 3.81–5.12)
SODIUM SERPL-SCNC: 142 MMOL/L (ref 136–145)
WBC # BLD AUTO: 7.75 THOUSAND/UL (ref 4.31–10.16)

## 2020-12-06 PROCEDURE — 85027 COMPLETE CBC AUTOMATED: CPT | Performed by: PHYSICIAN ASSISTANT

## 2020-12-06 PROCEDURE — 99232 SBSQ HOSP IP/OBS MODERATE 35: CPT | Performed by: STUDENT IN AN ORGANIZED HEALTH CARE EDUCATION/TRAINING PROGRAM

## 2020-12-06 PROCEDURE — 99232 SBSQ HOSP IP/OBS MODERATE 35: CPT | Performed by: PHYSICIAN ASSISTANT

## 2020-12-06 PROCEDURE — 82948 REAGENT STRIP/BLOOD GLUCOSE: CPT

## 2020-12-06 PROCEDURE — 80048 BASIC METABOLIC PNL TOTAL CA: CPT | Performed by: PHYSICIAN ASSISTANT

## 2020-12-06 RX ORDER — POTASSIUM CHLORIDE 20 MEQ/1
40 TABLET, EXTENDED RELEASE ORAL ONCE
Status: COMPLETED | OUTPATIENT
Start: 2020-12-06 | End: 2020-12-06

## 2020-12-06 RX ORDER — LANOLIN ALCOHOL/MO/W.PET/CERES
3 CREAM (GRAM) TOPICAL
Status: DISCONTINUED | OUTPATIENT
Start: 2020-12-06 | End: 2020-12-07 | Stop reason: HOSPADM

## 2020-12-06 RX ADMIN — DEXTROSE AND SODIUM CHLORIDE 75 ML/HR: 5; .45 INJECTION, SOLUTION INTRAVENOUS at 00:27

## 2020-12-06 RX ADMIN — ONDANSETRON 4 MG: 2 INJECTION INTRAMUSCULAR; INTRAVENOUS at 12:37

## 2020-12-06 RX ADMIN — OXYCODONE HYDROCHLORIDE 5 MG: 5 TABLET ORAL at 06:03

## 2020-12-06 RX ADMIN — OXYCODONE HYDROCHLORIDE 5 MG: 5 TABLET ORAL at 12:37

## 2020-12-06 RX ADMIN — CEFAZOLIN SODIUM 2000 MG: 2 SOLUTION INTRAVENOUS at 07:55

## 2020-12-06 RX ADMIN — CEFAZOLIN SODIUM 2000 MG: 2 SOLUTION INTRAVENOUS at 16:41

## 2020-12-06 RX ADMIN — OXYCODONE HYDROCHLORIDE 2.5 MG: 5 TABLET ORAL at 00:32

## 2020-12-06 RX ADMIN — CEFAZOLIN SODIUM 2000 MG: 2 SOLUTION INTRAVENOUS at 00:26

## 2020-12-06 RX ADMIN — ONDANSETRON 4 MG: 2 INJECTION INTRAMUSCULAR; INTRAVENOUS at 06:04

## 2020-12-06 RX ADMIN — OXYCODONE HYDROCHLORIDE 5 MG: 5 TABLET ORAL at 17:04

## 2020-12-06 RX ADMIN — POTASSIUM CHLORIDE 40 MEQ: 1500 TABLET, EXTENDED RELEASE ORAL at 12:41

## 2020-12-06 RX ADMIN — PANTOPRAZOLE SODIUM 40 MG: 40 TABLET, DELAYED RELEASE ORAL at 05:58

## 2020-12-06 NOTE — ASSESSMENT & PLAN NOTE
· D/W Surgical Oncology  · Anemia likely secondary to chronic disease from suspected cancer as opposed to blood loss anemia  Mass had some mild mucosal oozing, but do not suspect active bleeding at this time

## 2020-12-06 NOTE — PROGRESS NOTES
Pt c/o pain and soreness of the left arm  Ace wrapped arm and applied warm compress  Elevated on a pillow

## 2020-12-06 NOTE — ASSESSMENT & PLAN NOTE
· POA with leukocytosis and tachycardia  · Likely secondary to perigastric mass  · Initially there was concern for perforation, but this was ruled out on repeat CT scan  · discontinued Cefepime, Flagyl, and Micofungin

## 2020-12-06 NOTE — ASSESSMENT & PLAN NOTE
· S/p EUS with FNA on 12/4  · Preliminary pathology suggestive of GIST  · Surgical Oncology following  · Can get Med/Onc input once pathology available - will likely need to start [address] place an ambulatory referral for heme/onc at discharge  · H/H stable, no signs of bleeding  · Advance diet

## 2020-12-06 NOTE — ASSESSMENT & PLAN NOTE
· Left arm antecubital IV infiltrate with surrounding erythema and swelling  · Will keep on IV Ancef  · Warm compresses  · Monitor daily  · Redness subsiding

## 2020-12-06 NOTE — PROGRESS NOTES
Pt requested med to help with sleep  Edson martinezed nPario and she ordered Melatonin  Pharmacy had to verify and when I finally was able to take into the pt I found the pt sleeping  Did not give med as it was to assist with falling asleep and pt was found already asleep

## 2020-12-06 NOTE — PROGRESS NOTES
Progress Note - Ish Keith 1940, [de-identified] y o  female MRN: [id number]    Unit/Bed#: [facility] 909-01 Encounter: [id number]    Primary Care Provider: Luisa Reid MD   Date and time admitted to hospital: 12/1/2020  7:01 PM        SIRS (systemic inflammatory response syndrome) ([facility] [address] )  Assessment & Plan  · POA with leukocytosis and tachycardia  · Likely secondary to perigastric mass  · Initially there was concern for perforation, but this was ruled out on repeat CT scan  · discontinued Cefepime, Flagyl, and Micofungin    * Intraabdominal mass  Assessment & Plan  · S/p EUS with FNA on 12/4  · Preliminary pathology suggestive of GIST  · Surgical Oncology following  · Can get Med/Onc input once pathology available - will likely need to start [address] - place an ambulatory referral for heme/onc at discharge  · H/H stable, no signs of bleeding  · Advance diet    Melena  Assessment & Plan  · Suspect oozing from gastric mass (mild mucosal oozing noted on EGD without evidence of a lesion)  · Hemoglobin remains stable  · Monitor closely  · Supportive care with blood transfusions as needed  · GI following, unfortunately there is not a great option for endoscopic hemostasis  · Continue PPI  · No sign of active bleeding at this time  · Advance diet    Anemia  Assessment & Plan  · D/W Surgical Oncology  · Anemia likely secondary to chronic disease from suspected cancer as opposed to blood loss anemia  Mass had some mild mucosal oozing, but do not suspect active bleeding at this time  HTN (hypertension)  Assessment & Plan  · Not on blood pressure meds at home  · P r n   Labetalol and hydralazine as needed for elevated blood pressure  · Monitor for now, may need to consider adding an oral antihypertensive agent - will hold off for now    IV infiltrate, initial encounter  Assessment & Plan  · Left arm antecubital IV infiltrate with surrounding erythema and swelling  · Will keep on IV Ancef  · Warm compresses  · Monitor daily  · Redness subsiding    Hypokalemia  Assessment & Plan  · Replace      VTE Pharmacologic Prophylaxis:   Pharmacologic: Pharmacologic VTE Prophylaxis contraindicated due to melena  Mechanical VTE Prophylaxis in Place: Yes    Patient Centered Rounds: I have performed bedside rounds with nursing staff today  Discussions with Specialists or Other Care Team Provider: Surg/Onc resident    Education and Discussions with Family / Patient: patient, son called with update    Time Spent for Care: 30 minutes  More than 50% of total time spent on counseling and coordination of care as described above  Current Length of Stay: 5 day(s)    Current Patient Status: Inpatient   Certification Statement: The patient will continue to require additional inpatient hospital stay due to advancing diet, IV antibiotic    Discharge Plan: PT/OT originally recommending short-term rehab, but patient was able to walk down the garrison with nursing today  Will have PT re-evaluate patient tomorrow  Hopeful discharge in next 24-48 hours    Code Status: Level 2 - DNAR: but accepts endotracheal intubation      Subjective:   Patient more energetic today  States left arm is still swollen, but is improving from yesterday  Denies any pain  Tolerating clears    Objective:     Vitals:   Temp (24hrs), Av 3 °F (36 8 °C), Min:98 2 °F (36 8 °C), Max:98 5 °F (36 9 °C)    Temp:  [98 2 °F (36 8 °C)-98 5 °F (36 9 °C)] 98 2 °F (36 8 °C)  HR:  [91-96] 91  Resp:  [16-20] 18  BP: (156-168)/(78-88) 156/78  SpO2:  [95 %-98 %] 97 %  Body mass index is 20 84 kg/m²  Input and Output Summary (last 24 hours): Intake/Output Summary (Last 24 hours) at 2020 1225  Last data filed at 2020 0027  Gross per 24 hour   Intake 1340 ml   Output 500 ml   Net 840 ml       Physical Exam:     Physical Exam  Constitutional:       Appearance: Normal appearance  Cardiovascular:      Rate and Rhythm: Normal rate and regular rhythm  Heart sounds:  No murmur  Pulmonary:      Effort: Pulmonary effort is normal       Breath sounds: Normal breath sounds  Abdominal:      General: Bowel sounds are normal  There is no distension  Palpations: Abdomen is soft  Tenderness: There is no abdominal tenderness  Skin:     General: Skin is warm and dry  Comments: left arm edema and erythema around antecubital fossa  Neurological:      General: No focal deficit present  Mental Status: She is alert and oriented to person, place, and time  Psychiatric:         Mood and Affect: Mood normal          Additional Data:     Labs:    Results from last 7 days   Lab Units 12/06/20  0743  12/03/20  0451   WBC Thousand/uL 7 75   < > 9 13   HEMOGLOBIN g/dL 8 1*   < > 6 9*   HEMATOCRIT % 26 4*   < > 20 5*   PLATELETS Thousands/uL 172   < > 168   NEUTROS PCT %  --   --  76*   LYMPHS PCT %  --   --  12*   MONOS PCT %  --   --  10   EOS PCT %  --   --  1    < > = values in this interval not displayed  Results from last 7 days   Lab Units 12/06/20  0742  12/02/20  0520   SODIUM mmol/L 142   < > 149*   POTASSIUM mmol/L 3 4*   < > 3 8   CHLORIDE mmol/L 110*   < > 121*   CO2 mmol/L 26   < > 23   BUN mg/dL 9   < > 36*   CREATININE mg/dL 0 31*   < > 0 39*   ANION GAP mmol/L 6   < > 5   CALCIUM mg/dL 8 3   < > 7 5*   ALBUMIN g/dL  --   --  2 1*   TOTAL BILIRUBIN mg/dL  --   --  0 95   ALK PHOS U/L  --   --  57   ALT U/L  --   --  12   AST U/L  --   --  9   GLUCOSE RANDOM mg/dL 112   < > 127    < > = values in this interval not displayed       Results from last 7 days   Lab Units 12/02/20  0520   INR  1 22*     Results from last 7 days   Lab Units 12/06/20  1140 12/05/20  2059 12/05/20  1651 12/05/20  1202 12/04/20  2345 12/04/20  1808 12/04/20  0522 12/03/20  2339 12/03/20  1719 12/03/20  1143 12/02/20  2347 12/02/20  1713   POC GLUCOSE mg/dl 110 126 127 120 140 115 134 111 100 91 103 94         Results from last 7 days   Lab Units 12/02/20  0012   LACTIC ACID mmol/L 1 1 * I Have Reviewed All Lab Data Listed Above  * Additional Pertinent Lab Tests Reviewed: All Labs Within Last 24 Hours Reviewed    Imaging:    Imaging Reports Reviewed Today Include: EGD, EUS  Imaging Personally Reviewed by Myself Includes:  none    Recent Cultures (last 7 days):     Results from last 7 days   Lab Units 12/02/20  0152 12/02/20  0151   BLOOD CULTURE  No Growth After 4 Days  No Growth After 4 Days  Last 24 Hours Medication List:   Current Facility-Administered Medications   Medication Dose Route Frequency Provider Last Rate    cefazolin  2,000 mg Intravenous Q8H Renea Cooper PA-C 2,000 mg (12/06/20 0755)    hydrALAZINE  10 mg Intravenous Q4H PRN Nancylee Do, PA-EDITH      HYDROmorphone  0 2 mg Intravenous Q3H PRN Migue Miller PA-C      iohexol  50 mL Oral 90 min pre-procedure Nancylee Do, PA-EDITH      Labetalol HCl  10 mg Intravenous Q4H PRN Nancylee DoMURPHY      lidocaine  1 patch Topical Daily Winifredcylepatrizia Justice PA-C      melatonin  3 mg Oral HS PRN Vanecanelo Clarke PA-C      ondansetron  4 mg Intravenous Q4H PRN Nancylee DoMURPHY      oxyCODONE  2 5 mg Oral Q4H PRN Migue Miller PA-C      oxyCODONE  5 mg Oral Q4H PRN Migue Miller PA-C      pantoprazole  40 mg Oral Early Morning Migue Miller PA-C      phenol  1 spray Mouth/Throat Q2H PRN Nancylee DoMURPHY          Today, Patient Was Seen By: Migue Miller PA-C    ** Please Note: Dictation voice to text software may have been used in the creation of this document   **

## 2020-12-06 NOTE — ASSESSMENT & PLAN NOTE
· Not on blood pressure meds at home  · P r n   Labetalol and hydralazine as needed for elevated blood pressure  · Monitor for now, may need to consider adding an oral antihypertensive agent - will hold off for now

## 2020-12-06 NOTE — PROGRESS NOTES
Pt had 2 IV sites in the R arm, 1 in the R [facility] and the second in the R upper forearm  Both sites were leaking  I removed both IV sites and a single site was placed in the Right distal forearm   Currently infusing D5 and 1/2 NS at [facility]

## 2020-12-06 NOTE — PROGRESS NOTES
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     SIRS of non-infectious origin w/o acute organ dysfunction - POA, likely 2/2 to perigastric mass, as evidenced by leukocytosis and tachycardia, requiring monitoring of WBC and temp, and telemetry monitoring  Plan:  Advance diet as tolerated  D/C IVF  Ok to D/C Abx  F/u final path  Referral to medical oncology  DVT PPX  Primary per medicine - dispo planning    Subjective/Objective     Subjective: NAEO, afebrile, tolerating clear liquid diet, minimal nausea resolved with zofran, no vomiting, passing flatus and having bowel movements    Objective:    Blood pressure 164/82, pulse 96, temperature 98 5 °F (36 9 °C), resp  rate 20, height 5' (1 524 m), weight 48 1 kg (106 lb 0 7 oz), SpO2 98 %  ,Body mass index is 20 71 kg/m²  Intake/Output Summary (Last 24 hours) at 12/6/2020 0649  Last data filed at 12/6/2020 0027  Gross per 24 hour   Intake 1460 ml   Output 500 ml   Net 960 ml       Invasive Devices     Peripheral Intravenous Line            Peripheral IV 12/06/20 Distal;Dorsal (posterior); Right Forearm less than 1 day                Physical Exam:  General: AAO x 3, NAD  HEENT: Normocephalic, atraumatic, conjunctiva normal, EOMI, PERRLA  Neck: No JVD, No LAD  Cardio: RRR  Resp: NWB on RA  Abd: Soft, nontender, nondistended, No guarding, no rebound  Neuro: Sensation and motor intact x 4 limbs  Extremities: WWP, No edema  Skin: Warm and dry      Lab, Imaging and other studies:I have personally reviewed pertinent lab results      VTE Pharmacologic Prophylaxis: Heparin  VTE Mechanical Prophylaxis: sequential compression device

## 2020-12-07 VITALS
DIASTOLIC BLOOD PRESSURE: 76 MMHG | BODY MASS INDEX: 21.04 KG/M2 | TEMPERATURE: 98.7 F | RESPIRATION RATE: 20 BRPM | HEART RATE: 92 BPM | HEIGHT: 60 IN | WEIGHT: 107.14 LBS | OXYGEN SATURATION: 98 % | SYSTOLIC BLOOD PRESSURE: 141 MMHG

## 2020-12-07 LAB
ANION GAP SERPL CALCULATED.3IONS-SCNC: 6 MMOL/L (ref 4–13)
BACTERIA BLD CULT: NORMAL
BACTERIA BLD CULT: NORMAL
BUN SERPL-MCNC: 7 MG/DL (ref 5–25)
CALCIUM SERPL-MCNC: 8.6 MG/DL (ref 8.3–10.1)
CHLORIDE SERPL-SCNC: 109 MMOL/L (ref 100–108)
CO2 SERPL-SCNC: 28 MMOL/L (ref 21–32)
CREAT SERPL-MCNC: 0.3 MG/DL (ref 0.6–1.3)
ERYTHROCYTE [DISTWIDTH] IN BLOOD BY AUTOMATED COUNT: 15.1 % (ref 11.6–15.1)
GFR SERPL CREATININE-BSD FRML MDRD: 108 ML/MIN/1.73SQ M
GLUCOSE SERPL-MCNC: 100 MG/DL (ref 65–140)
GLUCOSE SERPL-MCNC: 94 MG/DL (ref 65–140)
HCT VFR BLD AUTO: 25.1 % (ref 34.8–46.1)
HGB BLD-MCNC: 7.9 G/DL (ref 11.5–15.4)
MCH RBC QN AUTO: 29.3 PG (ref 26.8–34.3)
MCHC RBC AUTO-ENTMCNC: 31.5 G/DL (ref 31.4–37.4)
MCV RBC AUTO: 93 FL (ref 82–98)
PLATELET # BLD AUTO: 189 THOUSANDS/UL (ref 149–390)
PMV BLD AUTO: 10.4 FL (ref 8.9–12.7)
POTASSIUM SERPL-SCNC: 3.6 MMOL/L (ref 3.5–5.3)
RBC # BLD AUTO: 2.7 MILLION/UL (ref 3.81–5.12)
SODIUM SERPL-SCNC: 143 MMOL/L (ref 136–145)
WBC # BLD AUTO: 6.72 THOUSAND/UL (ref 4.31–10.16)

## 2020-12-07 PROCEDURE — 99239 HOSP IP/OBS DSCHRG MGMT >30: CPT | Performed by: PHYSICIAN ASSISTANT

## 2020-12-07 PROCEDURE — 80048 BASIC METABOLIC PNL TOTAL CA: CPT | Performed by: PHYSICIAN ASSISTANT

## 2020-12-07 PROCEDURE — 82948 REAGENT STRIP/BLOOD GLUCOSE: CPT

## 2020-12-07 PROCEDURE — 85027 COMPLETE CBC AUTOMATED: CPT | Performed by: PHYSICIAN ASSISTANT

## 2020-12-07 PROCEDURE — 97530 THERAPEUTIC ACTIVITIES: CPT

## 2020-12-07 PROCEDURE — 97164 PT RE-EVAL EST PLAN CARE: CPT

## 2020-12-07 RX ORDER — PANTOPRAZOLE SODIUM 40 MG/1
40 TABLET, DELAYED RELEASE ORAL
Qty: 30 TABLET | Refills: 0 | Status: SHIPPED | OUTPATIENT
Start: 2020-12-08 | End: 2020-12-23 | Stop reason: SDUPTHER

## 2020-12-07 RX ORDER — CEPHALEXIN 500 MG/1
500 CAPSULE ORAL EVERY 6 HOURS SCHEDULED
Qty: 16 CAPSULE | Refills: 0 | Status: SHIPPED | OUTPATIENT
Start: 2020-12-07 | End: 2020-12-11

## 2020-12-07 RX ORDER — OXYCODONE HYDROCHLORIDE 5 MG/1
5 TABLET ORAL EVERY 4 HOURS PRN
Qty: 30 TABLET | Refills: 0 | Status: SHIPPED | OUTPATIENT
Start: 2020-12-07 | End: 2020-12-10 | Stop reason: SDUPTHER

## 2020-12-07 RX ORDER — ONDANSETRON 4 MG/1
4 TABLET, ORALLY DISINTEGRATING ORAL EVERY 6 HOURS PRN
Qty: 20 TABLET | Refills: 0 | Status: SHIPPED | OUTPATIENT
Start: 2020-12-07 | End: 2020-12-10 | Stop reason: SDUPTHER

## 2020-12-07 RX ADMIN — LIDOCAINE 1 PATCH: 50 PATCH TOPICAL at 08:27

## 2020-12-07 RX ADMIN — OXYCODONE HYDROCHLORIDE 5 MG: 5 TABLET ORAL at 05:58

## 2020-12-07 RX ADMIN — PANTOPRAZOLE SODIUM 40 MG: 40 TABLET, DELAYED RELEASE ORAL at 05:58

## 2020-12-07 RX ADMIN — CEFAZOLIN SODIUM 2000 MG: 2 SOLUTION INTRAVENOUS at 00:21

## 2020-12-07 RX ADMIN — OXYCODONE HYDROCHLORIDE 5 MG: 5 TABLET ORAL at 18:04

## 2020-12-07 RX ADMIN — ONDANSETRON 4 MG: 2 INJECTION INTRAMUSCULAR; INTRAVENOUS at 05:58

## 2020-12-07 RX ADMIN — CEFAZOLIN SODIUM 2000 MG: 2 SOLUTION INTRAVENOUS at 08:27

## 2020-12-07 NOTE — PLAN OF CARE
Problem: PHYSICAL THERAPY ADULT  Goal: Performs mobility at highest level of function for planned discharge setting  See evaluation for individualized goals  Description: Treatment/Interventions: Functional transfer training, Elevations, Therapeutic exercise, Endurance training, Patient/family training, Equipment eval/education, Bed mobility, Gait training, Compensatory technique education, Spoke to nursing, Spoke to case management  Equipment Recommended: (RW)       See flowsheet documentation for full assessment, interventions and recommendations  Outcome: Progressing  Note: Prognosis: Fair  Problem List: Decreased strength, Decreased endurance, Impaired balance, Decreased mobility, Decreased safety awareness  Assessment: Pt seen for re-evaluation today  Pt is seated in chair  Pleasant and cooperative reporting no c/o pain  Generalized c/o fatigue which was most noted during bed mobility  Pt education on log rolling and progression to sitting from sidelying to decrease abdominal/back strain  Pt demonstrated understanding of instructions and written handout of technique provided  Pt was also provided with isometric TE for QS/GS, heel slide and ankle df/pf to perform as able due to her report of generalized weakness  Quad strength good today, ankle df good, hip flexion good -  Pt required VC for hand placement with transfers, foot position and for sufficient anterior weight shift with progression to stand  Increased difficulty noted from low surfaces and some assistance/CG provided  Pt was able to stand with CS from recliner chair  She was encouraged to sit in furniture that was supportive and with arms present to push from  Discouraged sitting in rocking chair which she said she had at home  Pt was also assisted to bathroom during session and required assist to stand due to low seat height  BSC would be recommended upon discharge to assist with toileting at home  Pt gait was stable on level surfaces   Pt was instructed to  any throw rugs that she may have at home as they pose fall risk with use of RW  Pt reports having them in her kitchen and bath and will be able to have them picked up  Pt reports that her  will be able to assist at home as well as her children which live nearby  Pt was instructed to ask for assist if having to get up at night to assure safety with transfers when just awakening  Pt was also provided with written hand out on car transfers and method of safe performance  Will continue to follow during hospital stay to maximize activity tolerance, mobility, balance and safety  RW recommended for discharge/  Barriers to Discharge: (medical clearance)     PT Discharge Recommendation: Home with skilled therapy, Return to previous environment with social support     PT - OK to Discharge: Yes(to rehab when medically cleared)    See flowsheet documentation for full assessment

## 2020-12-07 NOTE — NURSING NOTE
Per pt, her son is aware she has been discharged and will be here to pick her up between 7 and 7:30 this evening  AVS left at bedside, pt requesting RN to review when son is at bedside

## 2020-12-07 NOTE — PROGRESS NOTES
Nya De La Rosa blemarceed and annointed     12/07/20 1500   Clinical Encounter Type   Visited With Patient   Quaker Encounters   Quaker Needs Prayer   Sacramental Encounters   Sacrament of Sick-Anointing Anointed

## 2020-12-07 NOTE — PHYSICAL THERAPY NOTE
Physical Therapy Re-evaluation and treatment    ([id number])       12/07/20 1030   PT Last Visit   PT Visit Date 12/07/20   Note Type   Note type Re-Evaluation  (treatment)   Pain Assessment   Pain Assessment Tool Pain Assessment not indicated - pt denies pain   Home Living   Additional Comments see IE for full report   Prior Function   Level of [place] Independent with ADLs and functional mobility  (no AD)   Lives With Spouse   Receives Help From Family  (supportive children that live nearby)   Falls in the last 6 months 0   Restrictions/Precautions   Weight Bearing Precautions Per Order No   Other Precautions Chair Alarm; Bed Alarm; Fall Risk;Telemetry   General   Family/Caregiver Present No   Cognition   Arousal/Participation Cooperative   Attention Within functional limits   Following Commands Follows all commands and directions without difficulty   RUE Assessment   RUE Assessment WFL   LUE Assessment   LUE Assessment WFL   RLE Assessment   RLE Assessment WFL   LLE Assessment   LLE Assessment WFL   Light Touch   RLE Light Touch Grossly intact   Bed Mobility   Rolling R 7  Independent   Rolling L 7  Independent   Supine to Sit 5  Supervision   Sit to Supine 5  Supervision   Additional Comments Pt noted to have fatigue with bed mobility  Transfers   Sit to Stand 5  Supervision  (From recliner chair, CG/Min Assist from WC(lower surface))   Additional items Assist x 1;Verbal cues; Increased time required;Armrests  (Vc for forward trunk flexion and feet position under knees)   Stand to Sit 5  Supervision   Additional items Assist x 1; Increased time required;Armrests; Verbal cues   Stand pivot 5  Supervision   Additional items Assist x 1  (RW)   Ambulation/Elevation   Gait pattern Short stride  (decreased heel strike)   Gait Assistance 5  Supervision   Additional items Assist x 1;Verbal cues   Assistive Device Rolling walker   Distance 50 feet , 100 feet   Stair Management Assistance   (CGA)   Additional items Assist x 1   Stair Management Technique Step to pattern  (RLE ascending , LLE descending)   Number of Stairs 3   Balance   Static Sitting Good   Static Standing Fair  (RW)   Ambulatory Fair  (RW- smooth level surfaces)   Activity Tolerance   Activity Tolerance Patient limited by fatigue   Nurse Made Aware RN clears to see   Assessment   Prognosis Fair   Problem List Decreased strength;Decreased endurance; Impaired balance;Decreased mobility; Decreased safety awareness   Assessment Pt seen for re-evaluation today  Pt is seated in chair  Pleasant and cooperative reporting no c/o pain  Generalized c/o fatigue which was most noted during bed mobility  Pt education on log rolling and progression to sitting from sidelying to decrease abdominal/back strain  Pt demonstrated understanding of instructions and written handout of technique provided  Pt was also provided with isometric TE for QS/GS, heel slide and ankle df/pf to perform as able due to her report of generalized weakness  Quad strength good today, ankle df good, hip flexion good -  Pt required VC for hand placement with transfers, foot position and for sufficient anterior weight shift with progression to stand  Increased difficulty noted from low surfaces and some assistance/CG provided  Pt was able to stand with CS from recliner chair  She was encouraged to sit in furniture that was supportive and with arms present to push from  Discouraged sitting in rocking chair which she said she had at home  Pt was also assisted to bathroom during session and required assist to stand due to low seat height  BSC would be recommended upon discharge to assist with toileting at home  Pt gait was stable on level surfaces  Pt was instructed to  any throw rugs that she may have at home as they pose fall risk with use of RW  Pt reports having them in her kitchen and bath and will be able to have them picked up   Pt reports that her  will be able to assist at home as well as her children which live nearby  Pt was instructed to ask for assist if having to get up at night to assure safety with transfers when just awakening  Pt was also provided with written hand out on car transfers and method of safe performance  Will continue to follow during hospital stay to maximize activity tolerance, mobility, balance and safety  RW recommended for discharge/   Barriers to Discharge   (medical clearance)   Goals   Patient Goals to get home and get stronger   STG Expiration Date 12/16/20   Short Term Goal #1 In 12 days, pt will be able to: 1) perform bed mobility independently to return to PLOF  2) perform sit<->stand transfer with supervision to decrease level of caregiver support  3) ambulate 150ft with min A x1 to improve ambulation tolerance  4) negotiate 4 steps with mod A x1 in order to navigate home environment  5) improve LE strength by 1/2 grade to increase independence with functional transfers and ambulation deficits  6) improve balance by 1/2 grade to decrease overall fall risk  PT Treatment Day 1   Plan   Treatment/Interventions Functional transfer training;Elevations; Therapeutic exercise; Endurance training;Patient/family training;Equipment eval/education; Bed mobility;Gait training; Compensatory technique education;Spoke to nursing;Spoke to case management   PT Frequency Other (Comment)  (3-5x/wk)   Recommendation   PT Discharge Recommendation Home with skilled therapy; Return to previous environment with social support   Equipment Recommended   (RW), BSC     Pt treatment consisted of education in transfers from various surfaces ( WC, toilet, bed, recliner)  Education and performance of TE for LE mobility with written handout provided  Pt assisted with toileting  Use of GB for safety and assist  Pt demonstrated ability to stand at sink x 1-2 minutes for hand hygiene  Vitals stable at rest and following mobility  Pt HR noted to increase into low 120's with baseline in 90's    BP 131/81 rest, HR 94 bpm and post ambulation /78,  bpm   Pt education in log rolling and transitioning to sitting from sidelying  Pt demonstrates ability to perform however notes general fatigue  Pt education in energy conservation and taking shorter walks at home but more frequent rather that one episode of long activity and stationary x many hours  Recommend pt be supervised with mobility at home using RW as AD at this time  Pt expressed family will be able to help   home all day and mobile and children near by and very supportive      Mary Jane Elena, PT

## 2020-12-07 NOTE — ASSESSMENT & PLAN NOTE
· S/p EUS with FNA on 12/4  · Preliminary pathology suggestive of GIST  · Surgical Oncology following - outpatient follow up  · Can get Med/Onc input once pathology available - will likely need to start [address] - placed an ambulatory referral for heme/onc at discharge  · H/H stable, no signs of bleeding - F/U CBC in 1 week  · Advance diet - tolerating full diet

## 2020-12-07 NOTE — DISCHARGE SUMMARY
Discharge- Baron Interiano 1940, [de-identified] y o  female MRN: [id number]    Unit/Bed#: [facility] 909-01 Encounter: [id number]    Primary Care Provider: Winsome Ambrosio MD   Date and time admitted to hospital: 12/1/2020  7:01 PM        SIRS (systemic inflammatory response syndrome) ([facility] [address] )  Assessment & Plan  · POA with leukocytosis and tachycardia  · Likely secondary to perigastric mass  · Initially there was concern for perforation, but this was ruled out on repeat CT scan  · discontinued Cefepime, Flagyl, and Micofungin    * Intraabdominal mass  Assessment & Plan  · S/p EUS with FNA on 12/4  · Preliminary pathology suggestive of GIST  · Surgical Oncology following - outpatient follow up  · Can get Med/Onc input once pathology available - will likely need to start [address] - placed an ambulatory referral for heme/onc at discharge  · H/H stable, no signs of bleeding - F/U CBC in 1 week  · Advance diet - tolerating full diet    Melena  Assessment & Plan  · Suspect oozing from gastric mass (mild mucosal oozing noted on EGD without evidence of a lesion)  · Hemoglobin remains stable  · Monitor closely  · Supportive care with blood transfusions as needed  · GI following, unfortunately there is not a great option for endoscopic hemostasis  · Continue PPI  · No sign of active bleeding at this time  · Advance diet    Anemia  Assessment & Plan  · D/W Surgical Oncology  · Anemia likely secondary to chronic disease from suspected cancer as opposed to blood loss anemia  Mass had some mild mucosal oozing, but do not suspect active bleeding at this time  HTN (hypertension)  Assessment & Plan  · Not on blood pressure meds at home  · P r n   Labetalol and hydralazine as needed for elevated blood pressure  · Monitor for now, may need to consider adding an oral antihypertensive agent - will hold off for now    IV infiltrate, initial encounter  Assessment & Plan  · Left arm antecubital IV infiltrate with surrounding erythema and swelling  · on IV Ancef - transition to oral Keflex for 4 more days  · Warm compresses  · Monitor daily  · Redness subsiding    Hypokalemia  Assessment & Plan  · Replaced        Discharging Physician / Practitioner: Saud Stephens PA-C  PCP: Tom Leung MD  Admission Date:   Admission Orders (From admission, onward)     Ordered        12/01/20 2329  Inpatient Admission  Once                   Discharge Date: 12/07/20    Resolved Problems  Date Reviewed: 12/7/2020          Resolved    Syncope 12/4/2020     Resolved by  Shantal Dallas PA-C    Gastric perforation ([address] ) 12/4/2020     Resolved by  Shantal Dallas PA-C          Consultations During Hospital Stay:  · Dr Nathaniel Severino  · Dr Sulema Molina    Procedures Performed:     CT 12/1 - Large mass involving the lesser sac of the stomach with air tracking from the superior portion of the mass into a fluid collection suggestive of a perforation    CT 12/2 - Large ulcerated mass measuring 9 3 x 8 6 x 14 cm likely arising from stomach with exophytic component that abuts and has mass effect on the body of the pancreas, left adrenal gland and left kidney  Small amount of free fluid adjacent to the mass and tail of the pancreas  No pneumoperitoneum, abscess or extraluminal contrast extravasation  EUS 12/4 - At least 8 5 cm gastric submucosal mass, appearing to arise from the muscularis propria (4th layer)  Preliminary cytology did reveal stromal cells  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    EGD 12/2 - Patchy erosions likely trauma from NG tube in the middle third of the esophagus and lower third of the esophagus  Gastric lumen diameter was reduced due to extrinsic compression by a possible extrinsic mass  There was no mass seen intraluminally  There was some mucosal oozing from the gastric wall overlying the mass  There was no obvious communication between the mass and the gastric lumen    The duodenal bulb, 1st part of the duodenum and 2nd part of the duodenum appeared normal        Significant Findings / Test Results:   · See above    Incidental Findings:   · none     Test Results Pending at Discharge (will require follow up):   · FNA of gastric mass     Outpatient Tests Requested:  · CBC in 1 week    Complications:  none    Reason for Admission: syncope, nausea, and abdominal pain    Hospital Course:     Lucas Zuluaga is a [de-identified] y o  female patient who originally presented to the hospital on 12/1/2020 due to syncope, nausea, and abdominal pain  Patient had a CT scan of the abdomen and pelvis showing a large gastric versus retroperitoneal mass with question of perforation  Repeat CT scan ruled out perforation  Patient underwent EGD to investigate mass  She was noted to have a gastric mass  She then underwent endoscopic ultrasound and biopsy  Preliminary pathology suspicious of GIST  Plan is to follow-up with Heme/Onc (ambulatory referral placed) once biopsy results are confirmed and evaluate for chemotherapy  Surgical oncology will follow along with the patient as an outpatient as well  Patient was monitored for a few days while she advanced her diet  Her hemoglobin did drop slightly  There is concern about possible bleeding from the mass  Surgical oncology felt that her anemia was most likely secondary to anemia chronic disease and not secondary to active bleeding  Patient is currently not showing any signs or symptoms of active bleeding at this time  She did have some mild mucosal oozing on EGD, but no noted lesion  Will remain on a proton pump inhibitor at discharge  Patient also had a left antecubital IV infiltrate which developed significant redness and edema  She was started on antibiotic with improvement  She will discharge home on oral Keflex  Patient was seen in consultation by PT/OT  At 1st they recommended rehab, but patient improved and will be able to go home with VNA services        Please see above list of diagnoses and related plan for additional information  Condition at Discharge: good     Discharge Day Visit / Exam:     Subjective:  Patient feeling pretty good today  She is able to tolerate a full diet  Patient states she still has occasional nausea and abdominal discomfort  Vitals: Blood Pressure: 138/62 (12/07/20 0659)  Pulse: 85 (12/07/20 0659)  Temperature: 98 4 °F (36 9 °C) (12/07/20 0659)  Temp Source: Oral (12/04/20 1942)  Respirations: 20 (12/07/20 0659)  Height: 5' (152 4 cm) (12/04/20 1524)  Weight - Scale: 48 6 kg (107 lb 2 3 oz) (12/07/20 0600)  SpO2: 96 % (12/07/20 0659)  Exam:   Physical Exam  Constitutional:       Appearance: Normal appearance  Cardiovascular:      Rate and Rhythm: Normal rate and regular rhythm  Heart sounds: No murmur  Pulmonary:      Effort: Pulmonary effort is normal       Breath sounds: Normal breath sounds  Abdominal:      General: Bowel sounds are normal  There is no distension  Palpations: Abdomen is soft  Tenderness: There is no abdominal tenderness  Skin:     General: Skin is warm and dry  Neurological:      General: No focal deficit present  Mental Status: She is alert and oriented to person, place, and time  Psychiatric:         Mood and Affect: Mood normal        Discussion with Family: daughter called with update    Discharge instructions/Information to patient and family:   See after visit summary for information provided to patient and family  Provisions for Follow-Up Care:  See after visit summary for information related to follow-up care and any pertinent home health orders  Disposition:     Home with VNA Services (Reminder: Complete face to face encounter)    For Discharges to [facility] SNF:   · Not Applicable to this Patient - Not Applicable to this Patient    Planned Readmission: none     Discharge Statement:  I spent 50 minutes discharging the patient  This time was spent on the day of discharge   I had direct contact with the patient on the day of discharge  Greater than 50% of the total time was spent examining patient, answering all patient questions, arranging and discussing plan of care with patient as well as directly providing post-discharge instructions  Additional time then spent on discharge activities  Discharge Medications:  See after visit summary for reconciled discharge medications provided to patient and family        ** Please Note: This note has been constructed using a voice recognition system **

## 2020-12-07 NOTE — ASSESSMENT & PLAN NOTE
· Suspect oozing from gastric mass (mild mucosal oozing noted on EGD without evidence of a lesion)  · Hemoglobin remains stable  · Monitor closely  · Supportive care with blood transfusions as needed  · GI following, unfortunately there is not a great option for endoscopic hemostasis  · Continue PPI  · No sign of active bleeding at this time  · Advance diet

## 2020-12-07 NOTE — CASE MANAGEMENT
A post acute care recommendation was made by your care team for [facility]  Discussed [place] of [facility] with patient  List of agencies given to patient via in person  patient aware the list is custom filtered for them by zip code location and that [facility] post acute providers are designated  Notified that [facility] is closed to referrals  Reviewed list, she would like [facility] or [facility]  [facility] cannot see until 12/11, Betzy Hidalgo can see 12/9  [facility] accepted care    Recommended for walker and [facility], agreeable to [facility]    DME delivered to room by [facility] liaison

## 2020-12-07 NOTE — ASSESSMENT & PLAN NOTE
· Left arm antecubital IV infiltrate with surrounding erythema and swelling  · on IV Ancef - transition to oral Keflex for 4 more days  · Warm compresses  · Monitor daily  · Redness subsiding

## 2020-12-08 ENCOUNTER — TRANSITIONAL CARE MANAGEMENT (OUTPATIENT)
Dept: FAMILY MEDICINE CLINIC | Facility: CLINIC | Age: 80
End: 2020-12-08

## 2020-12-08 ENCOUNTER — TELEPHONE (OUTPATIENT)
Dept: FAMILY MEDICINE CLINIC | Facility: CLINIC | Age: 80
End: 2020-12-08

## 2020-12-10 DIAGNOSIS — K31.89 MASS OF STOMACH: ICD-10-CM

## 2020-12-11 RX ORDER — OXYCODONE HYDROCHLORIDE 5 MG/1
TABLET ORAL
Qty: 10 TABLET | Refills: 0 | Status: SHIPPED | OUTPATIENT
Start: 2020-12-11 | End: 2020-12-22

## 2020-12-11 RX ORDER — ONDANSETRON 4 MG/1
4 TABLET, ORALLY DISINTEGRATING ORAL EVERY 6 HOURS PRN
Qty: 20 TABLET | Refills: 0 | Status: SHIPPED | OUTPATIENT
Start: 2020-12-11 | End: 2020-12-18 | Stop reason: SDUPTHER

## 2020-12-14 ENCOUNTER — OFFICE VISIT (OUTPATIENT)
Dept: FAMILY MEDICINE CLINIC | Facility: CLINIC | Age: 80
End: 2020-12-14
Payer: MEDICARE

## 2020-12-14 ENCOUNTER — TELEPHONE (OUTPATIENT)
Dept: SURGICAL ONCOLOGY | Facility: CLINIC | Age: 80
End: 2020-12-14

## 2020-12-14 ENCOUNTER — LAB (OUTPATIENT)
Dept: LAB | Facility: CLINIC | Age: 80
End: 2020-12-14
Payer: MEDICARE

## 2020-12-14 ENCOUNTER — TRANSCRIBE ORDERS (OUTPATIENT)
Dept: LAB | Facility: CLINIC | Age: 80
End: 2020-12-14

## 2020-12-14 VITALS
HEART RATE: 97 BPM | DIASTOLIC BLOOD PRESSURE: 80 MMHG | HEIGHT: 60 IN | OXYGEN SATURATION: 98 % | BODY MASS INDEX: 19.27 KG/M2 | WEIGHT: 98.13 LBS | TEMPERATURE: 98 F | SYSTOLIC BLOOD PRESSURE: 120 MMHG

## 2020-12-14 DIAGNOSIS — K31.89 MASS OF STOMACH: ICD-10-CM

## 2020-12-14 DIAGNOSIS — E78.00 ELEVATED CHOLESTEROL: ICD-10-CM

## 2020-12-14 DIAGNOSIS — Z09 HOSPITAL DISCHARGE FOLLOW-UP: Primary | ICD-10-CM

## 2020-12-14 DIAGNOSIS — M81.0 OSTEOPOROSIS, UNSPECIFIED OSTEOPOROSIS TYPE, UNSPECIFIED PATHOLOGICAL FRACTURE PRESENCE: ICD-10-CM

## 2020-12-14 PROBLEM — C49.A0 GIST (GASTROINTESTINAL STROMAL TUMOR), MALIGNANT (HCC): Status: ACTIVE | Noted: 2020-12-14

## 2020-12-14 LAB
ERYTHROCYTE [DISTWIDTH] IN BLOOD BY AUTOMATED COUNT: 14.6 % (ref 11.6–15.1)
HCT VFR BLD AUTO: 32.7 % (ref 34.8–46.1)
HGB BLD-MCNC: 9.7 G/DL (ref 11.5–15.4)
MCH RBC QN AUTO: 27.5 PG (ref 26.8–34.3)
MCHC RBC AUTO-ENTMCNC: 29.7 G/DL (ref 31.4–37.4)
MCV RBC AUTO: 93 FL (ref 82–98)
PLATELET # BLD AUTO: 327 THOUSANDS/UL (ref 149–390)
PMV BLD AUTO: 10 FL (ref 8.9–12.7)
RBC # BLD AUTO: 3.53 MILLION/UL (ref 3.81–5.12)
WBC # BLD AUTO: 6.9 THOUSAND/UL (ref 4.31–10.16)

## 2020-12-14 PROCEDURE — 36415 COLL VENOUS BLD VENIPUNCTURE: CPT

## 2020-12-14 PROCEDURE — 85027 COMPLETE CBC AUTOMATED: CPT

## 2020-12-14 PROCEDURE — 99496 TRANSJ CARE MGMT HIGH F2F 7D: CPT | Performed by: FAMILY MEDICINE

## 2020-12-14 RX ORDER — DOCUSATE SODIUM 100 MG/1
50 CAPSULE, LIQUID FILLED ORAL DAILY
COMMUNITY
End: 2021-01-28

## 2020-12-15 ENCOUNTER — TELEPHONE (OUTPATIENT)
Dept: FAMILY MEDICINE CLINIC | Facility: CLINIC | Age: 80
End: 2020-12-15

## 2020-12-16 ENCOUNTER — TELEPHONE (OUTPATIENT)
Dept: PALLIATIVE MEDICINE | Facility: CLINIC | Age: 80
End: 2020-12-16

## 2020-12-16 ENCOUNTER — CONSULT (OUTPATIENT)
Dept: SURGICAL ONCOLOGY | Facility: CLINIC | Age: 80
End: 2020-12-16
Payer: MEDICARE

## 2020-12-16 VITALS
HEIGHT: 60 IN | BODY MASS INDEX: 18.65 KG/M2 | WEIGHT: 95 LBS | RESPIRATION RATE: 16 BRPM | TEMPERATURE: 98.5 F | SYSTOLIC BLOOD PRESSURE: 140 MMHG | DIASTOLIC BLOOD PRESSURE: 72 MMHG

## 2020-12-16 DIAGNOSIS — C49.A2 MALIGNANT GASTROINTESTINAL STROMAL TUMOR (GIST) OF STOMACH (HCC): Primary | ICD-10-CM

## 2020-12-16 DIAGNOSIS — K31.89 MASS OF STOMACH: ICD-10-CM

## 2020-12-16 PROCEDURE — 99215 OFFICE O/P EST HI 40 MIN: CPT | Performed by: SURGERY

## 2020-12-16 RX ORDER — TRAMADOL HYDROCHLORIDE 50 MG/1
50 TABLET ORAL EVERY 6 HOURS PRN
Qty: 10 TABLET | Refills: 0 | Status: SHIPPED | OUTPATIENT
Start: 2020-12-16 | End: 2020-12-22 | Stop reason: SDUPTHER

## 2020-12-18 ENCOUNTER — TELEPHONE (OUTPATIENT)
Dept: SURGICAL ONCOLOGY | Facility: CLINIC | Age: 80
End: 2020-12-18

## 2020-12-18 DIAGNOSIS — K31.89 MASS OF STOMACH: ICD-10-CM

## 2020-12-18 RX ORDER — ONDANSETRON 4 MG/1
4 TABLET, ORALLY DISINTEGRATING ORAL EVERY 6 HOURS PRN
Qty: 20 TABLET | Refills: 0 | Status: SHIPPED | OUTPATIENT
Start: 2020-12-18 | End: 2020-12-23 | Stop reason: SDUPTHER

## 2020-12-19 PROBLEM — Z09 HOSPITAL DISCHARGE FOLLOW-UP: Status: ACTIVE | Noted: 2020-12-19

## 2020-12-19 PROBLEM — K31.89 MASS OF STOMACH: Status: ACTIVE | Noted: 2020-12-19

## 2020-12-22 ENCOUNTER — CONSULT (OUTPATIENT)
Dept: PALLIATIVE MEDICINE | Facility: CLINIC | Age: 80
End: 2020-12-22
Payer: MEDICARE

## 2020-12-22 VITALS
RESPIRATION RATE: 14 BRPM | OXYGEN SATURATION: 98 % | DIASTOLIC BLOOD PRESSURE: 70 MMHG | SYSTOLIC BLOOD PRESSURE: 110 MMHG | WEIGHT: 92.59 LBS | HEART RATE: 107 BPM | TEMPERATURE: 98.3 F | BODY MASS INDEX: 18.08 KG/M2

## 2020-12-22 DIAGNOSIS — D50.0 ANEMIA DUE TO GASTROINTESTINAL BLOOD LOSS: Primary | ICD-10-CM

## 2020-12-22 DIAGNOSIS — C49.A2 MALIGNANT GASTROINTESTINAL STROMAL TUMOR (GIST) OF STOMACH (HCC): ICD-10-CM

## 2020-12-22 PROCEDURE — 99204 OFFICE O/P NEW MOD 45 MIN: CPT | Performed by: FAMILY MEDICINE

## 2020-12-22 RX ORDER — TRAMADOL HYDROCHLORIDE 50 MG/1
50 TABLET ORAL EVERY 6 HOURS PRN
Qty: 120 TABLET | Refills: 0 | Status: SHIPPED | OUTPATIENT
Start: 2020-12-22 | End: 2021-12-21

## 2020-12-22 RX ORDER — DEXAMETHASONE 1 MG
1 TABLET ORAL
Qty: 15 TABLET | Refills: 1 | Status: SHIPPED | OUTPATIENT
Start: 2020-12-22 | End: 2021-01-28

## 2020-12-22 RX ORDER — SUCRALFATE 1 G/1
1 TABLET ORAL
Qty: 120 TABLET | Refills: 0 | Status: SHIPPED | OUTPATIENT
Start: 2020-12-22 | End: 2020-12-30 | Stop reason: ALTCHOICE

## 2020-12-23 DIAGNOSIS — K31.89 MASS OF STOMACH: ICD-10-CM

## 2020-12-23 RX ORDER — ONDANSETRON 4 MG/1
4 TABLET, ORALLY DISINTEGRATING ORAL EVERY 6 HOURS PRN
Qty: 60 TABLET | Refills: 1 | Status: SHIPPED | OUTPATIENT
Start: 2020-12-23 | End: 2021-01-28 | Stop reason: SDUPTHER

## 2020-12-23 RX ORDER — PANTOPRAZOLE SODIUM 40 MG/1
40 TABLET, DELAYED RELEASE ORAL
Qty: 90 TABLET | Refills: 3 | Status: SHIPPED | OUTPATIENT
Start: 2020-12-23 | End: 2021-01-28

## 2020-12-30 ENCOUNTER — DOCUMENTATION (OUTPATIENT)
Dept: HEMATOLOGY ONCOLOGY | Facility: CLINIC | Age: 80
End: 2020-12-30

## 2020-12-30 ENCOUNTER — CONSULT (OUTPATIENT)
Dept: HEMATOLOGY ONCOLOGY | Facility: CLINIC | Age: 80
End: 2020-12-30
Payer: MEDICARE

## 2020-12-30 VITALS
DIASTOLIC BLOOD PRESSURE: 68 MMHG | TEMPERATURE: 98.3 F | HEART RATE: 85 BPM | WEIGHT: 92.2 LBS | HEIGHT: 60 IN | OXYGEN SATURATION: 97 % | RESPIRATION RATE: 16 BRPM | SYSTOLIC BLOOD PRESSURE: 112 MMHG | BODY MASS INDEX: 18.1 KG/M2

## 2020-12-30 DIAGNOSIS — D50.0 IRON DEFICIENCY ANEMIA DUE TO CHRONIC BLOOD LOSS: Primary | ICD-10-CM

## 2020-12-30 DIAGNOSIS — C49.A2 MALIGNANT GASTROINTESTINAL STROMAL TUMOR (GIST) OF STOMACH (HCC): ICD-10-CM

## 2020-12-30 DIAGNOSIS — K31.89 MASS OF STOMACH: ICD-10-CM

## 2020-12-30 PROCEDURE — 99205 OFFICE O/P NEW HI 60 MIN: CPT | Performed by: INTERNAL MEDICINE

## 2020-12-31 ENCOUNTER — DOCUMENTATION (OUTPATIENT)
Dept: HEMATOLOGY ONCOLOGY | Facility: CLINIC | Age: 80
End: 2020-12-31

## 2021-01-05 DIAGNOSIS — C49.A2 MALIGNANT GASTROINTESTINAL STROMAL TUMOR (GIST) OF STOMACH (HCC): Primary | ICD-10-CM

## 2021-01-05 DIAGNOSIS — K31.89 MASS OF STOMACH: ICD-10-CM

## 2021-01-05 RX ORDER — IMATINIB MESYLATE 400 MG/1
400 TABLET, FILM COATED ORAL DAILY
Qty: 30 TABLET | Refills: 4 | Status: SHIPPED | OUTPATIENT
Start: 2021-01-05 | End: 2021-05-14

## 2021-01-15 ENCOUNTER — TELEPHONE (OUTPATIENT)
Dept: HEMATOLOGY ONCOLOGY | Facility: CLINIC | Age: 81
End: 2021-01-15

## 2021-01-15 NOTE — TELEPHONE ENCOUNTER
Patient's daughter, Toby Shabazz, is calling in inquiring whether it is ok for her mother to receive the Covid vaccine, she can be reached back at [phone]

## 2021-01-15 NOTE — TELEPHONE ENCOUNTER
Tasneem Butler advised of above  Tasneem Butler given COVID 19 vaccine  MyChart pre-registration instructions  Tasneem Butler verbalized understanding of above

## 2021-01-17 ENCOUNTER — IMMUNIZATIONS (OUTPATIENT)
Dept: FAMILY MEDICINE CLINIC | Facility: HOSPITAL | Age: 81
End: 2021-01-17

## 2021-01-17 DIAGNOSIS — Z23 ENCOUNTER FOR IMMUNIZATION: Primary | ICD-10-CM

## 2021-01-17 PROCEDURE — 0001A SARS-COV-2 / COVID-19 MRNA VACCINE (PFIZER-BIONTECH) 30 MCG: CPT

## 2021-01-17 PROCEDURE — 91300 SARS-COV-2 / COVID-19 MRNA VACCINE (PFIZER-BIONTECH) 30 MCG: CPT

## 2021-01-28 ENCOUNTER — APPOINTMENT (OUTPATIENT)
Dept: LAB | Facility: CLINIC | Age: 81
End: 2021-01-28
Payer: MEDICARE

## 2021-01-28 ENCOUNTER — OFFICE VISIT (OUTPATIENT)
Dept: PALLIATIVE MEDICINE | Facility: CLINIC | Age: 81
End: 2021-01-28
Payer: MEDICARE

## 2021-01-28 VITALS
DIASTOLIC BLOOD PRESSURE: 64 MMHG | RESPIRATION RATE: 16 BRPM | TEMPERATURE: 98.2 F | BODY MASS INDEX: 18.57 KG/M2 | HEART RATE: 102 BPM | OXYGEN SATURATION: 98 % | WEIGHT: 94.58 LBS | SYSTOLIC BLOOD PRESSURE: 118 MMHG | HEIGHT: 60 IN

## 2021-01-28 DIAGNOSIS — K31.89 MASS OF STOMACH: ICD-10-CM

## 2021-01-28 PROCEDURE — 99213 OFFICE O/P EST LOW 20 MIN: CPT | Performed by: FAMILY MEDICINE

## 2021-01-28 RX ORDER — ONDANSETRON 4 MG/1
4 TABLET, ORALLY DISINTEGRATING ORAL
Qty: 90 TABLET | Refills: 0 | Status: SHIPPED | OUTPATIENT
Start: 2021-01-28 | End: 2021-03-31

## 2021-01-28 NOTE — PATIENT INSTRUCTIONS
Please protect yourself from COVID-19! Even though we do not good antiviral drugs for this infection, the following strategies can help you stay healthy:    = Wash your hands! Soap and water, or hand  with at least 60% alcohol, are both effective at killing the virus  = Wear a mask! This will help protect others from any virus particles you might spread  Your mouth and nose BOTH need to be covered  = Keep the distance! Keep 6 feet of distance from others people, even if they seem healthy  Keeping distance protects you from the other person's virus spread     = Get a vaccine! The [employer] are approved for emergency use in the [place]  These vaccines have been shown to be 90+% effective at preventing severe infections when combined with masking, hand-washing, and distancing  As of 1/26/2021, the following priority groups may get either vaccine:  + nursing home residents and staff  + front-line health workers  + ALL persons over age 72 (ages 76 and up can be seen at [facility])  + ANY person over age 12 that has a qualifying risk factor, such as diabetes, lung disease, or obesity  ==> Please use  the following website  to check your eligibility in [place]:  [employer]   ==> If you are under age 72, but still qualify, you may get a shot from many other locations outside [facility]: [facility], [employer] innovate | mentor | invest, Jaylon Aid, Michelle      We are recommending that ALL our patients get two shots of either vaccine, as early as it is available to them  Please keep an eye on the [employer], Esco[employer] Flores, or call [phone] to see when you will become eligible  If you are eligible by the criteria above, please ask our team to get you a shot! Numbers of Coronavirus cases are spiking in many [place] States    This is not a more dangerous virus, but a sign that more people in a community are spreading the virus  Please check the local disease reports near you if you consider travelling  As of 1/25/21, we do NOT advise travel outside the [facility] or [place])  Check out [employer] for Bo data that are updated daily:    http://www [employer]/     Global Epidemics  Org, from [facility]), will give you Mbdrxn-xn-Rzpqna information on virus cases:    Https://globalepidemics  org/

## 2021-01-29 ENCOUNTER — DOCUMENTATION (OUTPATIENT)
Dept: HEMATOLOGY ONCOLOGY | Facility: CLINIC | Age: 81
End: 2021-01-29

## 2021-01-29 NOTE — PROGRESS NOTES
1-28-21  Received email from [facility] stating patient is due for refill and is having difficulty paying for meds  Copay $48 30  Enrolled patient in grants waitlist as no funding is available for this DX    Email to [employer]  Requesting assistance thru compassion funds    2-2-21  Response received from [employer]  Regarding use of compassion funds to assist the patient with her copays  She is agreeable to assist  Forward response to Ignacio Kapoor  And Cira Austin  For processing of refill

## 2021-01-31 NOTE — PROGRESS NOTES
Outpatient Follow-Up - Palliative and Supportive Care   Trenton Vasquez [de-identified] y o  female [id number]    Assessment & Plan  1  Mass of stomach      - Counseling on health screening and disease prevention, COVID-19 specific (CPT V65 49)    Medications adjusted this encounter:  Requested Prescriptions     Signed Prescriptions Disp Refills    ondansetron (ZOFRAN-ODT) 4 mg disintegrating tablet 90 tablet 0     Sig: Take 1 tablet (4 mg total) by mouth 3 (three) times a day before meals     No orders of the defined types were placed in this encounter  Medications Discontinued During This Encounter   Medication Reason    docusate sodium (COLACE) 100 mg capsule     dexamethasone (DECADRON) 1 mg tablet     pantoprazole (PROTONIX) 40 mg tablet     ondansetron (ZOFRAN-ODT) 4 mg disintegrating tablet Reorder         Trenton Vasquez was seen today for symptoms and planning cares related to above illnesses  I have reviewed the patient's controlled substance dispensing history in the Prescription Drug Monitoring Program in compliance with the [facility] regulations before prescribing any controlled substances  They are invited to continue to follow with us  If there are questions or concerns, please contact us through our clinic/answering service 24 hours a day, seven days a week  Amanda Rebolledo MD  [facility]  [phone]      Visit Information    Accompanied By: Family member    Source of History: Self, Family member    History Limitations: None    Contacts: son Belle Kauffman - [phone]    History of Present Illness      Trenton Vasquez is a [de-identified] y o  female who 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  Pertinent issues include: symptom management, disease process education and discussion of prognosis      Since our last visit, she has done well, physically  She denies any particular challenges, pains, nor symptoms today    She does note that she has some worsening mental fog or forgetfulness as she continues with immunotherapy  We discussed that this is not uncommon with chemo treatment, and may improve over time, as her illness is treated and she begins to improve  Importantly, her cognitive symptoms may improved when immunotherapies are stopped  She denies any focal deficits  She has stopped a number of her medications, akiko steroids, as they were not making a noticeable difference in her symptom control  Past medical, surgical, social, and family histories are reviewed and pertinent updates are made  Review of Systems   Constitution: Negative for decreased appetite, weight gain and weight loss  HENT: Negative for hoarse voice and nosebleeds  Eyes: Negative for vision loss in left eye and vision loss in right eye  Cardiovascular: Negative for chest pain and dyspnea on exertion  Respiratory: Negative for cough and shortness of breath  Endocrine: Negative for polydipsia, polyphagia and polyuria  Skin: Negative for flushing and itching  Musculoskeletal: Negative for falls  Gastrointestinal: Negative for anorexia, jaundice, nausea and vomiting  Genitourinary: Negative for frequency  Neurological: Negative for dizziness  Psychiatric/Behavioral: Positive for memory loss  Negative for depression  The patient is not nervous/anxious  Vital Signs    /64 (BP Location: Right arm, Patient Position: Sitting, Cuff Size: Standard)   Pulse 102   Temp 98 2 °F (36 8 °C) (Temporal)   Resp 16   Ht 5' (1 524 m)   Wt 42 9 kg (94 lb 9 2 oz)   SpO2 98%   BMI 18 47 kg/m²     Physical Exam and Objective Data  Physical Exam  Constitutional:       General: She is not in acute distress  Appearance: She is ill-appearing  She is not toxic-appearing or diaphoretic  Comments: frail   HENT:      Head: Normocephalic and atraumatic        Right Ear: External ear normal       Left Ear: External ear normal       Mouth/Throat:      Comments: Mask not removed today  Eyes:      General:         Right eye: No discharge  Left eye: No discharge  Conjunctiva/sclera: Conjunctivae normal       Pupils: Pupils are equal, round, and reactive to light  Neck:      Trachea: No tracheal deviation  Cardiovascular:      Rate and Rhythm: Normal rate and regular rhythm  Pulmonary:      Effort: Pulmonary effort is normal  No respiratory distress  Breath sounds: No stridor  Abdominal:      General: There is no distension  Palpations: Abdomen is soft  Comments: Scaphoid   Skin:     General: Skin is warm and dry  Coloration: Skin is pale  Findings: No erythema or rash  Neurological:      General: No focal deficit present  Mental Status: She is alert and oriented to person, place, and time  Mental status is at baseline  Cranial Nerves: No cranial nerve deficit  Psychiatric:         Mood and Affect: Mood normal          Behavior: Behavior normal          Thought Content: Thought content normal          Judgment: Judgment normal            Radiology and Laboratory:  I personally reviewed and interpreted the following results: none new; reviewed hgb trends    30 minutes was spent face to face with Kev Levine and her family with greater than 50% of the time spent in counseling or coordination of care including discussions of etiology of diagnosis, risks and benefits of treatment, risk factors and risk reduction of disease and compliance with treatment regimen   Additional time was also spent in discussing coronavirus vaccine indications, availability, and logistical challenges  All of the patient's or agent's questions were answered during this discussion

## 2021-02-07 ENCOUNTER — IMMUNIZATIONS (OUTPATIENT)
Dept: FAMILY MEDICINE CLINIC | Facility: HOSPITAL | Age: 81
End: 2021-02-07

## 2021-02-07 DIAGNOSIS — Z23 ENCOUNTER FOR IMMUNIZATION: Primary | ICD-10-CM

## 2021-02-07 PROCEDURE — 91300 SARS-COV-2 / COVID-19 MRNA VACCINE (PFIZER-BIONTECH) 30 MCG: CPT

## 2021-02-07 PROCEDURE — 0002A SARS-COV-2 / COVID-19 MRNA VACCINE (PFIZER-BIONTECH) 30 MCG: CPT

## 2021-02-10 ENCOUNTER — TELEPHONE (OUTPATIENT)
Dept: HEMATOLOGY ONCOLOGY | Facility: CLINIC | Age: 81
End: 2021-02-10

## 2021-02-10 ENCOUNTER — LAB (OUTPATIENT)
Dept: LAB | Facility: CLINIC | Age: 81
End: 2021-02-10
Payer: MEDICARE

## 2021-02-10 NOTE — TELEPHONE ENCOUNTER
Patient has 9:30 a m  f/u apt tomorrow Thursday 02/11/2021 w/Perlita Menchaca at the [facility] office  Called pt and left message to call the office to do covid pre screening questionnaire, [phone]

## 2021-02-11 ENCOUNTER — TELEPHONE (OUTPATIENT)
Dept: HEMATOLOGY ONCOLOGY | Facility: CLINIC | Age: 81
End: 2021-02-11

## 2021-02-11 ENCOUNTER — OFFICE VISIT (OUTPATIENT)
Dept: HEMATOLOGY ONCOLOGY | Facility: CLINIC | Age: 81
End: 2021-02-11
Payer: MEDICARE

## 2021-02-11 VITALS
OXYGEN SATURATION: 99 % | HEIGHT: 60 IN | WEIGHT: 100 LBS | SYSTOLIC BLOOD PRESSURE: 118 MMHG | RESPIRATION RATE: 18 BRPM | DIASTOLIC BLOOD PRESSURE: 58 MMHG | BODY MASS INDEX: 19.63 KG/M2 | HEART RATE: 94 BPM | TEMPERATURE: 98.4 F

## 2021-02-11 DIAGNOSIS — D50.0 IRON DEFICIENCY ANEMIA DUE TO CHRONIC BLOOD LOSS: Primary | ICD-10-CM

## 2021-02-11 DIAGNOSIS — C49.A2 MALIGNANT GASTROINTESTINAL STROMAL TUMOR (GIST) OF STOMACH (HCC): ICD-10-CM

## 2021-02-11 DIAGNOSIS — E87.6 HYPOKALEMIA: ICD-10-CM

## 2021-02-11 PROCEDURE — 99214 OFFICE O/P EST MOD 30 MIN: CPT | Performed by: NURSE PRACTITIONER

## 2021-02-11 RX ORDER — FERROUS SULFATE TAB EC 324 MG (65 MG FE EQUIVALENT) 324 (65 FE) MG
324 TABLET DELAYED RESPONSE ORAL
Qty: 30 TABLET | Refills: 3 | Status: SHIPPED | OUTPATIENT
Start: 2021-02-11 | End: 2021-05-06 | Stop reason: SDUPTHER

## 2021-02-11 RX ORDER — POTASSIUM CHLORIDE 20 MEQ/1
20 TABLET, EXTENDED RELEASE ORAL 2 TIMES DAILY
Qty: 14 TABLET | Refills: 1 | Status: SHIPPED | OUTPATIENT
Start: 2021-02-11 | End: 2021-05-03

## 2021-02-11 NOTE — TELEPHONE ENCOUNTER
Called and spoke to patient's daughter Linda Andres with iron panel results  Iron saturation 9% with a ferritin level of 17  We discussed oral iron supplementation verses parenteral   Patient's daughter states she would prefer to start her mother on oral iron as patient may not want to go to the infusion center frequently secondary to winter weather  I think that is reasonable we can start oral iron supplement  I instructed patient's daughter   Also to increase patient's iron intake through diet with iron rich foods such as cereals, leafy green vegetables etc  I sent a prescription to her pharmacy for ferrous sulfate 325 mg p o  daily  We will obtain an iron level in the future and can arrange parenteral iron if oral iron is not effective  Patient's daughter early some verbalized understanding and is in agreement with the plan

## 2021-02-11 NOTE — PROGRESS NOTES
[address] [facility]AT[facility] [place]  [address] [facility] [place] [address] [phone]  Progress Note  Jovani Melendez, 1940, [id number]  2/11/2021    Assessment/Plan:  1  Malignant gastrointestinal stromal tumor (GIST) of stomach ([address] )  2  Iron deficiency anemia due to chronic blood loss  3  Hypokalemia     Patient is an 59-year-old female with a  History of GIST tumor currently on Gleevec 400 mg p o  daily  She has been on this for approximately 1 month and tolerating it fairly well  She did have 1 episode of diarrhea however, managed with Imodium  She does report some mild swelling of her lower extremity specifically right ankle greater than the left likely attributed to [address]  We reviewed her recent blood work, hemoglobin is 9 6, stable since her blood transfusion in December  We will request an iron panel to evaluate iron deficiency  Should she need an iron infusion we will arrange that with patient's daughter  Patient does report fatigue but is not short of breath  Her potassium is also low at 2 9  I encouraged her to increase her potassium intake with potassium rich foods such as bananas, orange juice,  Leafy greens etc   Patient's daughter states she has a decreased appetite, I encouraged supplementation with Ensure  I will also prescribe oral potassium supplement 20 mEq p o  b i d  for 7 days  She is having lab work every 2 weeks to monitor CBC and   CMP  We will see patient in 1 month with the following labs  Patient and her daughter verbalized understanding and are in agreement with the plan  - Iron Panel (Includes Ferritin, Iron Sat%, Iron, and TIBC); Future  - potassium chloride (K-DUR,KLOR-CON) 20 mEq tablet; Take 1 tablet (20 mEq total) by mouth 2 (two) times a day  Dispense: 14 tablet;  Refill: 1  - CBC and differential; Standing  - Comprehensive metabolic panel; Standing  - CBC and differential  - Comprehensive metabolic panel    Goals and Barriers:    Current Goal:   Prolong Survival from Cancer  Barriers: None  Patient's Capacity to Self Care:  Patient   Is able to self care   -------------------------------------------------------------------------------------------------------    Chief Complaint   Patient presents with    Follow-up       History of present illness/Cancer History:   Oncology History   GIST (gastrointestinal stromal tumor), malignant ([facility] [address] )   2020 Biopsy    Gastric mass FNA:  - Positive for neoplasm consistent with a gastrointestinal stromal tumor (GIST)          Cancer Staging  No matching staging information was found for the patient  ECO - Symptomatic but completely ambulatory    Interval history:   Clinically stable     Review of Systems   Constitutional: Positive for fatigue  Negative for activity change, appetite change, fever and unexpected weight change  Respiratory: Negative for cough and shortness of breath  Cardiovascular: Negative for chest pain and leg swelling  Gastrointestinal: Negative for abdominal pain, constipation, diarrhea and nausea  Endocrine: Negative for cold intolerance and heat intolerance  Musculoskeletal: Negative for arthralgias and myalgias  Skin: Negative  Neurological: Negative for dizziness, weakness and headaches  Hematological: Negative for adenopathy  Does not bruise/bleed easily  Current Outpatient Medications:     imatinib (GLEEVEC) 400 mg tablet, Take 1 tablet (400 mg total) by mouth daily, Disp: 30 tablet, Rfl: 4    loratadine (CLARITIN) 10 mg tablet, Take by mouth, Disp: , Rfl:     ondansetron (ZOFRAN-ODT) 4 mg disintegrating tablet, Take 1 tablet (4 mg total) by mouth 3 (three) times a day before meals, Disp: 90 tablet, Rfl: 0    traMADol (ULTRAM) 50 mg tablet, Take 1 tablet (50 mg total) by mouth every 6 (six) hours as needed (cancer pain    Max 4 tabs a day ), Disp: 120 tablet, Rfl: 0    potassium chloride (K-DUR,KLOR-CON) 20 mEq tablet, Take 1 tablet (20 mEq total) by mouth 2 (two) times a day, Disp: 14 tablet, Rfl: 1    Allergies   Allergen Reactions    Bactrim [Sulfamethoxazole-Trimethoprim]     Simvastatin Myalgia       Advance Directive and Living Will:        Objective:   /58 (BP Location: Left arm)   Pulse 94   Temp 98 4 °F (36 9 °C) (Tympanic Core)   Resp 18   Ht 5' (1 524 m)   Wt 45 4 kg (100 lb)   SpO2 99%   BMI 19 53 kg/m²   Wt Readings from Last 6 Encounters:   02/11/21 45 4 kg (100 lb)   01/28/21 42 9 kg (94 lb 9 2 oz)   12/30/20 41 8 kg (92 lb 3 2 oz)   12/22/20 42 kg (92 lb 9 5 oz)   12/16/20 43 1 kg (95 lb)   12/14/20 44 5 kg (98 lb 2 oz)       Physical Exam  Constitutional:       Appearance: Normal appearance  She is well-developed  HENT:      Head: Normocephalic and atraumatic  Eyes:      Conjunctiva/sclera: Conjunctivae normal       Pupils: Pupils are equal, round, and reactive to light  Neck:      Musculoskeletal: Normal range of motion and neck supple  Cardiovascular:      Rate and Rhythm: Normal rate and regular rhythm  Pulses: Normal pulses  Heart sounds: Normal heart sounds  No murmur  Pulmonary:      Effort: Pulmonary effort is normal  No respiratory distress  Breath sounds: Normal breath sounds  Abdominal:      General: Bowel sounds are normal       Palpations: Abdomen is soft  Musculoskeletal: Normal range of motion  Lymphadenopathy:      Cervical: No cervical adenopathy  Skin:     General: Skin is warm and dry  Capillary Refill: Capillary refill takes less than 2 seconds  Neurological:      Mental Status: She is alert and oriented to person, place, and time     Psychiatric:         Behavior: Behavior normal          Pertinent Laboratory Results and Imaging Review:  Ancillary Orders on 02/05/2021   Component Date Value Ref Range Status    WBC 02/10/2021 3 93* 4 31 - 10 16 Thousand/uL Final    RBC 02/10/2021 4 05  3 81 - 5 12 Million/uL Final    Hemoglobin 02/10/2021 9 5* 11 5 - 15 4 g/dL Final    Hematocrit 02/10/2021 32 0* 34 8 - 46 1 % Final    MCV 02/10/2021 79* 82 - 98 fL Final    MCH 02/10/2021 23 5* 26 8 - 34 3 pg Final    MCHC 02/10/2021 29 7* 31 4 - 37 4 g/dL Final    RDW 02/10/2021 21 6* 11 6 - 15 1 % Final    MPV 02/10/2021 9 3  8 9 - 12 7 fL Final    Platelets 37/05/5067 213  149 - 390 Thousands/uL Final    nRBC 02/10/2021 0  /100 WBCs Final    Neutrophils Relative 02/10/2021 64  43 - 75 % Final    Immat GRANS % 02/10/2021 1  0 - 2 % Final    Lymphocytes Relative 02/10/2021 18  14 - 44 % Final    Monocytes Relative 02/10/2021 13* 4 - 12 % Final    Eosinophils Relative 02/10/2021 3  0 - 6 % Final    Basophils Relative 02/10/2021 1  0 - 1 % Final    Neutrophils Absolute 02/10/2021 2 52  1 85 - 7 62 Thousands/µL Final    Immature Grans Absolute 02/10/2021 0 02  0 00 - 0 20 Thousand/uL Final    Lymphocytes Absolute 02/10/2021 0 71  0 60 - 4 47 Thousands/µL Final    Monocytes Absolute 02/10/2021 0 52  0 17 - 1 22 Thousand/µL Final    Eosinophils Absolute 02/10/2021 0 12  0 00 - 0 61 Thousand/µL Final    Basophils Absolute 02/10/2021 0 04  0 00 - 0 10 Thousands/µL Final    Sodium 02/10/2021 143  136 - 145 mmol/L Final    Potassium 02/10/2021 2 9* 3 5 - 5 3 mmol/L Final    Chloride 02/10/2021 107  100 - 108 mmol/L Final    CO2 02/10/2021 32  21 - 32 mmol/L Final    ANION GAP 02/10/2021 4  4 - 13 mmol/L Final    BUN 02/10/2021 10  5 - 25 mg/dL Final    Creatinine 02/10/2021 0 55* 0 60 - 1 30 mg/dL Final    Standardized to IDMS reference method    Glucose, Fasting 02/10/2021 106* 65 - 99 mg/dL Final    Specimen collection should occur prior to Sulfasalazine administration due to the potential for falsely depressed results  Specimen collection should occur prior to Sulfapyridine administration due to the potential for falsely elevated results      Calcium 02/10/2021 9 2  8 3 - 10 1 mg/dL Final    Corrected Calcium 02/10/2021 10 1  8 3 - 10 1 mg/dL Final    AST 02/10/2021 15  5 - 45 U/L Final    Specimen collection should occur prior to Sulfasalazine administration due to the potential for falsely depressed results   ALT 02/10/2021 16  12 - 78 U/L Final    Specimen collection should occur prior to Sulfasalazine and/or Sulfapyridine administration due to the potential for falsely depressed results   Alkaline Phosphatase 02/10/2021 116  46 - 116 U/L Final    Total Protein 02/10/2021 5 5* 6 4 - 8 2 g/dL Final    Albumin 02/10/2021 2 9* 3 5 - 5 0 g/dL Final    Total Bilirubin 02/10/2021 0 47  0 20 - 1 00 mg/dL Final    Use of this assay is not recommended for patients undergoing treatment with eltrombopag due to the potential for falsely elevated results      eGFR 02/10/2021 89  ml/min/1 73sq m Final   Ancillary Orders on 01/22/2021   Component Date Value Ref Range Status    WBC 01/28/2021 5 40  4 31 - 10 16 Thousand/uL Final    RBC 01/28/2021 4 16  3 81 - 5 12 Million/uL Final    Hemoglobin 01/28/2021 9 6* 11 5 - 15 4 g/dL Final    Hematocrit 01/28/2021 32 7* 34 8 - 46 1 % Final    MCV 01/28/2021 79* 82 - 98 fL Final    MCH 01/28/2021 23 1* 26 8 - 34 3 pg Final    MCHC 01/28/2021 29 4* 31 4 - 37 4 g/dL Final    RDW 01/28/2021 18 2* 11 6 - 15 1 % Final    MPV 01/28/2021 10 5  8 9 - 12 7 fL Final    Platelets 12/37/1847 209  149 - 390 Thousands/uL Final    nRBC 01/28/2021 0  /100 WBCs Final    Neutrophils Relative 01/28/2021 74  43 - 75 % Final    Immat GRANS % 01/28/2021 0  0 - 2 % Final    Lymphocytes Relative 01/28/2021 16  14 - 44 % Final    Monocytes Relative 01/28/2021 7  4 - 12 % Final    Eosinophils Relative 01/28/2021 2  0 - 6 % Final    Basophils Relative 01/28/2021 1  0 - 1 % Final    Neutrophils Absolute 01/28/2021 3 98  1 85 - 7 62 Thousands/µL Final    Immature Grans Absolute 01/28/2021 0 02  0 00 - 0 20 Thousand/uL Final    Lymphocytes Absolute 01/28/2021 0 85  0 60 - 4 47 Thousands/µL Final    Monocytes Absolute 01/28/2021 0 40  0 17 - 1 22 Thousand/µL Final    Eosinophils Absolute 01/28/2021 0 11  0 00 - 0 61 Thousand/µL Final    Basophils Absolute 01/28/2021 0 04  0 00 - 0 10 Thousands/µL Final    Sodium 01/28/2021 141  136 - 145 mmol/L Final    Potassium 01/28/2021 3 1* 3 5 - 5 3 mmol/L Final    Chloride 01/28/2021 107  100 - 108 mmol/L Final    CO2 01/28/2021 28  21 - 32 mmol/L Final    ANION GAP 01/28/2021 6  4 - 13 mmol/L Final    BUN 01/28/2021 14  5 - 25 mg/dL Final    Creatinine 01/28/2021 0 57* 0 60 - 1 30 mg/dL Final    Standardized to IDMS reference method    Glucose, Fasting 01/28/2021 94  65 - 99 mg/dL Final    Specimen collection should occur prior to Sulfasalazine administration due to the potential for falsely depressed results  Specimen collection should occur prior to Sulfapyridine administration due to the potential for falsely elevated results   Calcium 01/28/2021 9 3  8 3 - 10 1 mg/dL Final    Corrected Calcium 01/28/2021 9 9  8 3 - 10 1 mg/dL Final    AST 01/28/2021 17  5 - 45 U/L Final    Specimen collection should occur prior to Sulfasalazine administration due to the potential for falsely depressed results   ALT 01/28/2021 16  12 - 78 U/L Final    Specimen collection should occur prior to Sulfasalazine and/or Sulfapyridine administration due to the potential for falsely depressed results   Alkaline Phosphatase 01/28/2021 114  46 - 116 U/L Final    Total Protein 01/28/2021 6 2* 6 4 - 8 2 g/dL Final    Albumin 01/28/2021 3 3* 3 5 - 5 0 g/dL Final    Total Bilirubin 01/28/2021 0 50  0 20 - 1 00 mg/dL Final    Use of this assay is not recommended for patients undergoing treatment with eltrombopag due to the potential for falsely elevated results   eGFR 01/28/2021 88  ml/min/1 73sq m Final         The following historical data was reviewed      Past Medical History:   Diagnosis Date    High cholesterol        Past Surgical History:   Procedure Laterality Date    FL INJECTION LEFT HIP (NON ARTHROGRAM)  5/14/2019       Social History     Socioeconomic History    Marital status: /Civil Union     Spouse name: Not on file    Number of children: Not on file    Years of education: Not on file    Highest education level: Not on file   Occupational History    Not on file   Social Needs    Financial resource strain: Not on file    Food insecurity     Worry: Not on file     Inability: Not on file   [employer] needs     Medical: Not on file     Non-medical: Not on file   Tobacco Use    Smoking status: Never Smoker    Smokeless tobacco: Never Used   Substance and Sexual Activity    Alcohol use: Yes     Comment: occasional    Drug use: Never    Sexual activity: Not on file   Lifestyle    Physical activity     Days per week: Not on file     Minutes per session: Not on file    Stress: Not on file   Relationships    Social connections     Talks on phone: Not on file     Gets together: Not on file     Attends [employer] service: Not on file     Active member of club or organization: Not on file     Attends meetings of clubs or organizations: Not on file     Relationship status: Not on file    Intimate partner violence     Fear of current or ex partner: Not on file     Emotionally abused: Not on file     Physically abused: Not on file     Forced sexual activity: Not on file   Other Topics Concern    Not on file   Social History Narrative    Not on file       No family history on file  Please note: This report has been generated by a voice recognition software system  Therefore there may be syntax, spelling, and/or grammatical errors  Please call if you have any questions

## 2021-02-12 ENCOUNTER — TELEPHONE (OUTPATIENT)
Dept: HEMATOLOGY ONCOLOGY | Facility: CLINIC | Age: 81
End: 2021-02-12

## 2021-02-12 NOTE — TELEPHONE ENCOUNTER
Patient's daughter [place] advised of above  They will have her mother try the Ferrous Sulfate  They will call with any questions or concerns

## 2021-02-12 NOTE — TELEPHONE ENCOUNTER
Patient's daughter is calling to verify that patient will be ok to take Ferrous Sulfate with having a Bactrim allergy?

## 2021-02-24 ENCOUNTER — TELEPHONE (OUTPATIENT)
Dept: HEMATOLOGY ONCOLOGY | Facility: CLINIC | Age: 81
End: 2021-02-24

## 2021-02-24 ENCOUNTER — TRANSCRIBE ORDERS (OUTPATIENT)
Dept: LAB | Facility: CLINIC | Age: 81
End: 2021-02-24

## 2021-02-24 ENCOUNTER — LAB (OUTPATIENT)
Dept: LAB | Facility: CLINIC | Age: 81
End: 2021-02-24
Payer: MEDICARE

## 2021-02-24 DIAGNOSIS — D50.0 IRON DEFICIENCY ANEMIA DUE TO CHRONIC BLOOD LOSS: Primary | ICD-10-CM

## 2021-02-24 NOTE — TELEPHONE ENCOUNTER
Returned call to patients daughter  Reviewed that iron panel is to be done with her next lab draw in 2 weeks prior to follow up appointment  I placed the order in the chart  Daughter is asking if she can be called once CMP results are available to review potassium level and continuation ok PO supplementation    Will send to Wilfrido Haynes

## 2021-02-24 NOTE — TELEPHONE ENCOUNTER
Patient daughter Toby Shabazz states that she completed blood work today and  would like to know if a iron panel can be added  Best call back [phone]

## 2021-02-24 NOTE — TELEPHONE ENCOUNTER
Returned patient's daughter's call and spoke to patient's son to review blood work  Potassium is 4 1 from today  She completed the 7 day potassium supplementation  I instructed her to continue potassium rich foods and the the hold supplementation at this time  We will add an iron panel to today's blood rate which is pending  I will call them with those results  Patient's son verbalized understanding and  Is in agreement with the plan

## 2021-03-03 ENCOUNTER — TELEPHONE (OUTPATIENT)
Dept: HEMATOLOGY ONCOLOGY | Facility: CLINIC | Age: 81
End: 2021-03-03

## 2021-03-03 ENCOUNTER — OFFICE VISIT (OUTPATIENT)
Dept: PALLIATIVE MEDICINE | Facility: CLINIC | Age: 81
End: 2021-03-03
Payer: MEDICARE

## 2021-03-03 VITALS
TEMPERATURE: 97.6 F | OXYGEN SATURATION: 97 % | SYSTOLIC BLOOD PRESSURE: 116 MMHG | DIASTOLIC BLOOD PRESSURE: 60 MMHG | RESPIRATION RATE: 16 BRPM | HEART RATE: 93 BPM | BODY MASS INDEX: 18.77 KG/M2 | WEIGHT: 99.43 LBS | HEIGHT: 61 IN

## 2021-03-03 DIAGNOSIS — C49.A2 MALIGNANT GASTROINTESTINAL STROMAL TUMOR (GIST) OF STOMACH (HCC): ICD-10-CM

## 2021-03-03 DIAGNOSIS — R22.43 LOCALIZED SWELLING OF BOTH LOWER LEGS: Primary | ICD-10-CM

## 2021-03-03 DIAGNOSIS — K92.1 MELENA: ICD-10-CM

## 2021-03-03 PROCEDURE — 99214 OFFICE O/P EST MOD 30 MIN: CPT | Performed by: FAMILY MEDICINE

## 2021-03-03 RX ORDER — PANTOPRAZOLE SODIUM 20 MG/1
20 TABLET, DELAYED RELEASE ORAL DAILY
Qty: 30 TABLET | Refills: 0 | Status: SHIPPED | OUTPATIENT
Start: 2021-03-03 | End: 2021-03-29 | Stop reason: SDUPTHER

## 2021-03-03 RX ORDER — HYDROCHLOROTHIAZIDE 25 MG/1
12.5 TABLET ORAL DAILY PRN
Qty: 20 TABLET | Refills: 0 | Status: SHIPPED | OUTPATIENT
Start: 2021-03-03 | End: 2021-03-31

## 2021-03-03 NOTE — Clinical Note
Pls note that we are starting HCTZ for leg swelling  Will trust your guidance on this med vs her BPs as we move forward

## 2021-03-03 NOTE — PATIENT INSTRUCTIONS
Please protect yourself from COVID-19! Even though we do not good antiviral drugs for this infection, the following strategies can help you stay healthy:    = Wash your hands! Soap and water, or hand  with at least 60% alcohol, are both effective at killing the virus  = Wear a mask! This will help protect others from any virus particles you might spread  Your mouth and nose BOTH need to be covered  = Keep the distance! Keep 6 feet of distance from others people, even if they seem healthy  Keeping distance protects you from the other person's virus spread     = Get a vaccine! Three vaccines are approved for emergency use in the [place], made by [employer], and [employer]  These vaccines have been shown to be 90+% effective at preventing severe infections when combined with masking, hand-washing, and distancing  As of 3/1/2021, the following priority groups may get one of the vaccines in [place]:  + nursing home residents and staff  + front-line health workers  + ALL persons over age 72 (ages 76 and up can be seen at [facility])  + ANY person over age 12 that has a qualifying risk factor, such as diabetes, lung disease, or obesity  ==> Please use  the following website  to check your eligibility in [place]:  [employer]   ==> If you are under age 72, but still qualify, you may get a shot from many other locations outside [facility]: [facility], [employer], [facility], [place]      We are recommending that ALL our patients get a complete series of one of the three vaccines, as early as it is available to them  Please keep an eye on the [employer], Escobar Flores, or call [phone] to see when you will become eligible  If you are eligible by the criteria above, please ask our team to help get you a shot!       Informacion en espanol sobre vacunas, de nos companeros [address] --  [employer] d[employer]      Numbers of Coronavirus cases (and COVID deaths) are improving in many  States, but rates of transmission are still high  This means that many people in a community are still spreading the virus  Please check the local disease reports near you if you consider travelling  As of 3/1/21, we do NOT advise travel outside the [facility] ([place] or [place]) "        Check out [employer] for Bo data that are updated daily:    http://www [employer]/     Global Epidemics  Org, from [facility] (OUTPATIENT CAMP[facility]), will give you Fzxnbc-aj-Dvpkdd information on virus cases:    Https://globalepidemics  org/

## 2021-03-03 NOTE — PROGRESS NOTES
Outpatient Follow-Up - Palliative and Supportive Care   Pj Jang [de-identified] y o  female [id number]    Assessment & Plan  1  Localized swelling of both lower legs    2  Malignant gastrointestinal stromal tumor (GIST) of stomach ([address] )    3  Melena      - Counseling on health screening and disease prevention, COVID-19 specific (CPT V65 49)    Medications adjusted this encounter:  Requested Prescriptions     Signed Prescriptions Disp Refills    hydrochlorothiazide (HYDRODIURIL) 25 mg tablet 20 tablet 0     Sig: Take 0 5 tablets (12 5 mg total) by mouth daily as needed (foot swelling or puffiness)    pantoprazole (PROTONIX) 20 mg tablet 30 tablet 0     Sig: Take 1 tablet (20 mg total) by mouth daily To prevent heartburn     No orders of the defined types were placed in this encounter  There are no discontinued medications  Pj Jang was seen today for symptoms and planning cares related to above illnesses  I have reviewed the patient's controlled substance dispensing history in the Prescription Drug Monitoring Program in compliance with the [facility] regulations before prescribing any controlled substances  They are invited to continue to follow with us  If there are questions or concerns, please contact us through our clinic/answering service 24 hours a day, seven days a week  Fernie Guzman MD  [facility] Palliative and Supportive Care        Visit Information    Accompanied By: Family member    Source of History: Self, Family member    History Limitations: None    Contacts: son Juanis Ruiz    History of Present Illness      Pj Jang is a [de-identified] y o  female who presents in follow up of symptoms related to GIST of stomach    Pertinent issues include: symptom management, nausea      Sinc elast visit, she has continued to improve; she wishes to continue to taper medicines off her regimen, and we discussed that -- this far out from her GIST dx and treatment -- she may not need to continue high-dose PPI  This is akiko true as we work to improve her hgb counts with enteral Fe  Otherwise, her pain is non-existent, more or less, and her nausea and appetite slowly continue to improve  There is a persistent and annoying challenge with BLE swelling up to the ankles  We agreed to trial low dose diuretic for this, on an intermittent / PRN basis  Since our last visit, she has done well, physically  She denies any particular challenges, pains, nor symptoms today  She does note that she has some worsening mental fog or forgetfulness as she continues with immunotherapy  We discussed that this is not uncommon with chemo treatment, and may improve over time, as her illness is treated and she begins to improve  Importantly, her cognitive symptoms may improved when immunotherapies are stopped  She denies any focal deficits  She has stopped a number of her medications, akiko steroids, as they were not making a noticeable difference in her symptom control  Past medical, surgical, social, and family histories are reviewed and pertinent updates are made  Review of Systems   Constitution: Positive for weight loss  Negative for decreased appetite and weight gain  HENT: Negative for hoarse voice and nosebleeds  Eyes: Negative for vision loss in left eye and vision loss in right eye  Cardiovascular: Negative for chest pain and dyspnea on exertion  Respiratory: Negative for cough and shortness of breath  Endocrine: Negative for polydipsia, polyphagia and polyuria  Skin: Negative for flushing and itching  Musculoskeletal: Negative for falls  Gastrointestinal: Negative for anorexia, jaundice, nausea and vomiting  Genitourinary: Negative for frequency  Neurological: Negative for dizziness  Psychiatric/Behavioral: Negative for depression and memory loss  The patient is not nervous/anxious            Vital Signs    /60 (BP Location: Left arm, Patient Position: Sitting, Cuff Size: Standard)   Pulse 93   Temp 97 6 °F (36 4 °C) (Temporal)   Resp 16   Ht 5' 1 24" (1 555 m)   Wt 45 1 kg (99 lb 6 8 oz)   SpO2 97%   BMI 18 64 kg/m²     Physical Exam and Objective Data  Physical Exam  Constitutional:       General: She is not in acute distress  Appearance: She is ill-appearing  She is not toxic-appearing or diaphoretic  Comments: frail   HENT:      Head: Normocephalic and atraumatic  Right Ear: External ear normal       Left Ear: External ear normal       Mouth/Throat:      Comments: Mask not removed today  Eyes:      General:         Right eye: No discharge  Left eye: No discharge  Conjunctiva/sclera: Conjunctivae normal       Pupils: Pupils are equal, round, and reactive to light  Neck:      Trachea: No tracheal deviation  Cardiovascular:      Rate and Rhythm: Normal rate and regular rhythm  Pulmonary:      Effort: Pulmonary effort is normal  No respiratory distress  Breath sounds: No stridor  Abdominal:      General: There is no distension  Palpations: Abdomen is soft  Comments: Scaphoid   Skin:     General: Skin is warm and dry  Coloration: Skin is pale  Findings: No erythema or rash  Neurological:      General: No focal deficit present  Mental Status: She is alert and oriented to person, place, and time  Mental status is at baseline  Cranial Nerves: No cranial nerve deficit  Psychiatric:         Mood and Affect: Mood normal          Behavior: Behavior normal          Thought Content:  Thought content normal          Judgment: Judgment normal            Radiology and Laboratory:  I personally reviewed and interpreted the following results: hgb trends were noted with family    40 minutes was spent face to face with Jarod Aleman and her family with greater than 50% of the time spent in counseling or coordination of care including discussions of etiology of diagnosis, diagnostic results, impression, and recommendations, risks and benefits of treatment, follow up requirements and patient and family counseling/involvement in care   Additional time was also spent in discussing coronavirus vaccine indications, availability, and logistical challenges  All of the patient's or agent's questions were answered during this discussion

## 2021-03-10 ENCOUNTER — APPOINTMENT (OUTPATIENT)
Dept: LAB | Facility: CLINIC | Age: 81
End: 2021-03-10
Payer: MEDICARE

## 2021-03-10 ENCOUNTER — TELEPHONE (OUTPATIENT)
Dept: HEMATOLOGY ONCOLOGY | Facility: CLINIC | Age: 81
End: 2021-03-10

## 2021-03-10 DIAGNOSIS — D50.0 IRON DEFICIENCY ANEMIA DUE TO CHRONIC BLOOD LOSS: ICD-10-CM

## 2021-03-10 LAB
FERRITIN SERPL-MCNC: 22 NG/ML (ref 8–388)
IRON SATN MFR SERPL: 13 %
IRON SERPL-MCNC: 55 UG/DL (ref 50–170)
TIBC SERPL-MCNC: 414 UG/DL (ref 250–450)

## 2021-03-10 PROCEDURE — 83550 IRON BINDING TEST: CPT

## 2021-03-10 PROCEDURE — 82728 ASSAY OF FERRITIN: CPT

## 2021-03-10 PROCEDURE — 83540 ASSAY OF IRON: CPT

## 2021-03-11 ENCOUNTER — OFFICE VISIT (OUTPATIENT)
Dept: HEMATOLOGY ONCOLOGY | Facility: CLINIC | Age: 81
End: 2021-03-11
Payer: MEDICARE

## 2021-03-11 VITALS
TEMPERATURE: 99 F | WEIGHT: 96 LBS | HEART RATE: 76 BPM | HEIGHT: 60 IN | RESPIRATION RATE: 18 BRPM | BODY MASS INDEX: 18.85 KG/M2 | SYSTOLIC BLOOD PRESSURE: 120 MMHG | DIASTOLIC BLOOD PRESSURE: 62 MMHG

## 2021-03-11 DIAGNOSIS — C49.A2 MALIGNANT GASTROINTESTINAL STROMAL TUMOR (GIST) OF STOMACH (HCC): Primary | ICD-10-CM

## 2021-03-11 DIAGNOSIS — D50.0 IRON DEFICIENCY ANEMIA DUE TO CHRONIC BLOOD LOSS: ICD-10-CM

## 2021-03-11 PROCEDURE — 99214 OFFICE O/P EST MOD 30 MIN: CPT | Performed by: NURSE PRACTITIONER

## 2021-03-11 NOTE — PROGRESS NOTES
[facility] [facility]  [address] [facility] [place] [address] [phone]  Progress Note  Sandy Scheuermann, 1940, [id number]  3/11/2021    Assessment/Plan:  1  Malignant gastrointestinal stromal tumor (GIST) of stomach ([address] )  2  Iron deficiency anemia due to chronic blood loss    Patient is an 25-year-old female with a history of just tumor currently on Gleevec 400 mg p o  daily, tolerating without difficulty  She was previously noted to have iron deficiency with a saturation of 9%, ferritin level 17  She started taking oral iron supplementation and has improved to 13% with a ferritin level of 22  Patient's hemoglobin has also improved from 9 9-10 0  Patient is tolerating the oral iron without difficulty  We will continue to monitor, should she need iron infusions in the future we will do so accordingly  Overall patient is doing well and able to function without restriction  She does report some nausea every few days and is able to manage it with Zofran  She also reports dryness to her mouth however indicates she does not keep herself well hydrated  I encouraged her to improve her hydration with water throughout the day  I reviewed that she should be taking the [address] with a full glass of water  She is scheduled for a CT scan in April, we will request blood work prior to that exam  And see her shortly after to review all the results  Patient   And her daughter verbalized understanding and is in agreement with the plan  - CBC and differential; Future  - Comprehensive metabolic panel; Future  -  Iron panel    Goals and Barriers:    Current Goal:   Prolong Survival from Cancer  Barriers: None        Patient's Capacity to Self Care:  Patient is able to self care   -------------------------------------------------------------------------------------------------------    Chief Complaint   Patient presents with    Follow-up     4 weeks History of present illness/Cancer History:   Oncology History   GIST (gastrointestinal stromal tumor), malignant ([facility] [address] )   2020 Biopsy    Gastric mass FNA:  - Positive for neoplasm consistent with a gastrointestinal stromal tumor (GIST)          Cancer Staging  No matching staging information was found for the patient  ECO - Symptomatic but completely ambulatory    Interval history:  Clinically stable     Review of Systems   Constitutional: Negative for activity change, appetite change, fatigue, fever and unexpected weight change  Respiratory: Negative for cough and shortness of breath  Cardiovascular: Negative for chest pain and leg swelling  Gastrointestinal: Positive for nausea  Negative for abdominal pain, constipation and diarrhea  Endocrine: Negative for cold intolerance and heat intolerance  Musculoskeletal: Negative for arthralgias and myalgias  Skin: Negative  Neurological: Negative for dizziness, weakness and headaches  Hematological: Negative for adenopathy  Does not bruise/bleed easily           Current Outpatient Medications:     ferrous sulfate 324 (65 Fe) mg, Take 1 tablet (324 mg total) by mouth daily before breakfast, Disp: 30 tablet, Rfl: 3    hydrochlorothiazide (HYDRODIURIL) 25 mg tablet, Take 0 5 tablets (12 5 mg total) by mouth daily as needed (foot swelling or puffiness), Disp: 20 tablet, Rfl: 0    imatinib (GLEEVEC) 400 mg tablet, Take 1 tablet (400 mg total) by mouth daily, Disp: 30 tablet, Rfl: 4    loratadine (CLARITIN) 10 mg tablet, Take by mouth, Disp: , Rfl:     ondansetron (ZOFRAN-ODT) 4 mg disintegrating tablet, Take 1 tablet (4 mg total) by mouth 3 (three) times a day before meals, Disp: 90 tablet, Rfl: 0    pantoprazole (PROTONIX) 20 mg tablet, Take 1 tablet (20 mg total) by mouth daily To prevent heartburn, Disp: 30 tablet, Rfl: 0    traMADol (ULTRAM) 50 mg tablet, Take 1 tablet (50 mg total) by mouth every 6 (six) hours as needed (cancer pain   Max 4 tabs a day ), Disp: 120 tablet, Rfl: 0    potassium chloride (K-DUR,KLOR-CON) 20 mEq tablet, Take 1 tablet (20 mEq total) by mouth 2 (two) times a day (Patient not taking: Reported on 3/3/2021), Disp: 14 tablet, Rfl: 1    Allergies   Allergen Reactions    Bactrim [Sulfamethoxazole-Trimethoprim]     Simvastatin Myalgia       Advance Directive and Living Will:        Objective:   /62 (BP Location: Right arm, Patient Position: Sitting, Cuff Size: Standard)   Pulse 76   Temp 99 °F (37 2 °C) (Temporal)   Resp 18   Ht 5' (1 524 m)   Wt 43 5 kg (96 lb)   BMI 18 75 kg/m²   Wt Readings from Last 6 Encounters:   03/11/21 43 5 kg (96 lb)   03/03/21 45 1 kg (99 lb 6 8 oz)   02/11/21 45 4 kg (100 lb)   01/28/21 42 9 kg (94 lb 9 2 oz)   12/30/20 41 8 kg (92 lb 3 2 oz)   12/22/20 42 kg (92 lb 9 5 oz)       Physical Exam  Constitutional:       Appearance: Normal appearance  She is well-developed  HENT:      Head: Normocephalic and atraumatic  Mouth/Throat:      Mouth: Mucous membranes are moist    Eyes:      Conjunctiva/sclera: Conjunctivae normal       Pupils: Pupils are equal, round, and reactive to light  Neck:      Musculoskeletal: Normal range of motion and neck supple  Cardiovascular:      Rate and Rhythm: Normal rate and regular rhythm  Pulses: Normal pulses  Heart sounds: Normal heart sounds  No murmur  Pulmonary:      Effort: Pulmonary effort is normal  No respiratory distress  Breath sounds: Normal breath sounds  Abdominal:      General: Bowel sounds are normal       Palpations: Abdomen is soft  Musculoskeletal: Normal range of motion  Lymphadenopathy:      Cervical: No cervical adenopathy  Skin:     General: Skin is warm and dry  Capillary Refill: Capillary refill takes less than 2 seconds  Neurological:      Mental Status: She is alert and oriented to person, place, and time     Psychiatric:         Behavior: Behavior normal          Pertinent Laboratory Results and Imaging Review:  Appointment on 03/10/2021   Component Date Value Ref Range Status    Iron Saturation 03/10/2021 13  % Final    TIBC 03/10/2021 414  250 - 450 ug/dL Final    Iron 03/10/2021 55  50 - 170 ug/dL Final    Patients treated with metal-binding drugs (ie  Deferoxamine) may have depressed iron values      Ferritin 03/10/2021 22  8 - 388 ng/mL Final   Ancillary Orders on 03/05/2021   Component Date Value Ref Range Status    WBC 03/10/2021 5 84  4 31 - 10 16 Thousand/uL Final    RBC 03/10/2021 3 94  3 81 - 5 12 Million/uL Final    Hemoglobin 03/10/2021 10 0* 11 5 - 15 4 g/dL Final    Hematocrit 03/10/2021 33 1* 34 8 - 46 1 % Final    MCV 03/10/2021 84  82 - 98 fL Final    MCH 03/10/2021 25 4* 26 8 - 34 3 pg Final    MCHC 03/10/2021 30 2* 31 4 - 37 4 g/dL Final    MPV 03/10/2021 9 4  8 9 - 12 7 fL Final    Platelets 22/81/3428 253  149 - 390 Thousands/uL Final    nRBC 03/10/2021 0  /100 WBCs Final    Neutrophils Relative 03/10/2021 70  43 - 75 % Final    Immat GRANS % 03/10/2021 1  0 - 2 % Final    Lymphocytes Relative 03/10/2021 11* 14 - 44 % Final    Monocytes Relative 03/10/2021 14* 4 - 12 % Final    Eosinophils Relative 03/10/2021 3  0 - 6 % Final    Basophils Relative 03/10/2021 1  0 - 1 % Final    Neutrophils Absolute 03/10/2021 4 15  1 85 - 7 62 Thousands/µL Final    Immature Grans Absolute 03/10/2021 0 03  0 00 - 0 20 Thousand/uL Final    Lymphocytes Absolute 03/10/2021 0 62  0 60 - 4 47 Thousands/µL Final    Monocytes Absolute 03/10/2021 0 83  0 17 - 1 22 Thousand/µL Final    Eosinophils Absolute 03/10/2021 0 16  0 00 - 0 61 Thousand/µL Final    Basophils Absolute 03/10/2021 0 05  0 00 - 0 10 Thousands/µL Final    Sodium 03/10/2021 140  136 - 145 mmol/L Final    Potassium 03/10/2021 3 9  3 5 - 5 3 mmol/L Final    Chloride 03/10/2021 107  100 - 108 mmol/L Final    CO2 03/10/2021 28  21 - 32 mmol/L Final    ANION GAP 03/10/2021 5  4 - 13 mmol/L Final  BUN 03/10/2021 13  5 - 25 mg/dL Final    Creatinine 03/10/2021 0 70  0 60 - 1 30 mg/dL Final    Standardized to IDMS reference method    Glucose, Fasting 03/10/2021 100* 65 - 99 mg/dL Final    Specimen collection should occur prior to Sulfasalazine administration due to the potential for falsely depressed results  Specimen collection should occur prior to Sulfapyridine administration due to the potential for falsely elevated results   Calcium 03/10/2021 9 1  8 3 - 10 1 mg/dL Final    AST 03/10/2021 18  5 - 45 U/L Final    Specimen collection should occur prior to Sulfasalazine administration due to the potential for falsely depressed results   ALT 03/10/2021 19  12 - 78 U/L Final    Specimen collection should occur prior to Sulfasalazine and/or Sulfapyridine administration due to the potential for falsely depressed results   Alkaline Phosphatase 03/10/2021 117* 46 - 116 U/L Final    Total Protein 03/10/2021 6 4  6 4 - 8 2 g/dL Final    Albumin 03/10/2021 3 6  3 5 - 5 0 g/dL Final    Total Bilirubin 03/10/2021 0 57  0 20 - 1 00 mg/dL Final    Use of this assay is not recommended for patients undergoing treatment with eltrombopag due to the potential for falsely elevated results      eGFR 03/10/2021 82  ml/min/1 73sq m Final   Ancillary Orders on 02/19/2021   Component Date Value Ref Range Status    WBC 02/24/2021 5 47  4 31 - 10 16 Thousand/uL Final    RBC 02/24/2021 4 06  3 81 - 5 12 Million/uL Final    Hemoglobin 02/24/2021 9 9* 11 5 - 15 4 g/dL Final    Hematocrit 02/24/2021 32 9* 34 8 - 46 1 % Final    MCV 02/24/2021 81* 82 - 98 fL Final    MCH 02/24/2021 24 4* 26 8 - 34 3 pg Final    MCHC 02/24/2021 30 1* 31 4 - 37 4 g/dL Final    RDW 02/24/2021 26 6* 11 6 - 15 1 % Final    MPV 02/24/2021 9 8  8 9 - 12 7 fL Final    Platelets 70/98/3471 236  149 - 390 Thousands/uL Final    nRBC 02/24/2021 0  /100 WBCs Final    Neutrophils Relative 02/24/2021 70  43 - 75 % Final    Immat GRANS % 02/24/2021 0  0 - 2 % Final    Lymphocytes Relative 02/24/2021 14  14 - 44 % Final    Monocytes Relative 02/24/2021 12  4 - 12 % Final    Eosinophils Relative 02/24/2021 3  0 - 6 % Final    Basophils Relative 02/24/2021 1  0 - 1 % Final    Neutrophils Absolute 02/24/2021 3 87  1 85 - 7 62 Thousands/µL Final    Immature Grans Absolute 02/24/2021 0 01  0 00 - 0 20 Thousand/uL Final    Lymphocytes Absolute 02/24/2021 0 75  0 60 - 4 47 Thousands/µL Final    Monocytes Absolute 02/24/2021 0 64  0 17 - 1 22 Thousand/µL Final    Eosinophils Absolute 02/24/2021 0 16  0 00 - 0 61 Thousand/µL Final    Basophils Absolute 02/24/2021 0 04  0 00 - 0 10 Thousands/µL Final    Sodium 02/24/2021 140  136 - 145 mmol/L Final    Potassium 02/24/2021 4 1  3 5 - 5 3 mmol/L Final    Chloride 02/24/2021 107  100 - 108 mmol/L Final    CO2 02/24/2021 27  21 - 32 mmol/L Final    ANION GAP 02/24/2021 6  4 - 13 mmol/L Final    BUN 02/24/2021 12  5 - 25 mg/dL Final    Creatinine 02/24/2021 0 58* 0 60 - 1 30 mg/dL Final    Standardized to IDMS reference method    Glucose 02/24/2021 92  65 - 140 mg/dL Final    If the patient is fasting, the ADA then defines impaired fasting glucose as > 100 mg/dL and diabetes as > or equal to 123 mg/dL  Specimen collection should occur prior to Sulfasalazine administration due to the potential for falsely depressed results  Specimen collection should occur prior to Sulfapyridine administration due to the potential for falsely elevated results   Calcium 02/24/2021 9 4  8 3 - 10 1 mg/dL Final    Corrected Calcium 02/24/2021 10 0  8 3 - 10 1 mg/dL Final    AST 02/24/2021 16  5 - 45 U/L Final    Specimen collection should occur prior to Sulfasalazine administration due to the potential for falsely depressed results       ALT 02/24/2021 17  12 - 78 U/L Final    Specimen collection should occur prior to Sulfasalazine and/or Sulfapyridine administration due to the potential for falsely depressed results   Alkaline Phosphatase 02/24/2021 118* 46 - 116 U/L Final    Total Protein 02/24/2021 5 8* 6 4 - 8 2 g/dL Final    Albumin 02/24/2021 3 2* 3 5 - 5 0 g/dL Final    Total Bilirubin 02/24/2021 0 69  0 20 - 1 00 mg/dL Final    Use of this assay is not recommended for patients undergoing treatment with eltrombopag due to the potential for falsely elevated results   eGFR 02/24/2021 87  ml/min/1 73sq m Final    Iron Saturation 02/24/2021 9  % Final    TIBC 02/24/2021 349  250 - 450 ug/dL Final    Iron 02/24/2021 33* 50 - 170 ug/dL Final    Patients treated with metal-binding drugs (ie  Deferoxamine) may have depressed iron values   Ferritin 02/24/2021 20  8 - 388 ng/mL Final         The following historical data was reviewed      Past Medical History:   Diagnosis Date    High cholesterol        Past Surgical History:   Procedure Laterality Date    FL INJECTION LEFT HIP (NON ARTHROGRAM)  5/14/2019       Social History     Socioeconomic History    Marital status: /Civil Union     Spouse name: None    Number of children: None    Years of education: None    Highest education level: None   Occupational History    None   Social Needs    Financial resource strain: None    Food insecurity     Worry: None     Inability: None    Transportation needs     Medical: None     Non-medical: None   Tobacco Use    Smoking status: Never Smoker    Smokeless tobacco: Never Used   Substance and Sexual Activity    Alcohol use: Yes     Comment: occasional    Drug use: Never    Sexual activity: None   Lifestyle    Physical activity     Days per week: None     Minutes per session: None    Stress: None   Relationships    Social connections     Talks on phone: None     Gets together: None     Attends Rastafari service: None     Active member of club or organization: None     Attends meetings of clubs or organizations: None     Relationship status: None    Intimate partner violence     Fear of current or ex partner: None     Emotionally abused: None     Physically abused: None     Forced sexual activity: None   Other Topics Concern    None   Social History Narrative    None       History reviewed  No pertinent family history  Please note: This report has been generated by a voice recognition software system  Therefore there may be syntax, spelling, and/or grammatical errors  Please call if you have any questions

## 2021-03-24 DIAGNOSIS — C49.A2 MALIGNANT GASTROINTESTINAL STROMAL TUMOR (GIST) OF STOMACH (HCC): ICD-10-CM

## 2021-03-24 DIAGNOSIS — K92.1 MELENA: ICD-10-CM

## 2021-03-24 RX ORDER — PANTOPRAZOLE SODIUM 20 MG/1
20 TABLET, DELAYED RELEASE ORAL DAILY
Qty: 30 TABLET | Refills: 0 | OUTPATIENT
Start: 2021-03-24

## 2021-03-26 DIAGNOSIS — C49.A2 MALIGNANT GASTROINTESTINAL STROMAL TUMOR (GIST) OF STOMACH (HCC): ICD-10-CM

## 2021-03-26 DIAGNOSIS — K92.1 MELENA: ICD-10-CM

## 2021-03-28 DIAGNOSIS — K31.89 MASS OF STOMACH: ICD-10-CM

## 2021-03-28 DIAGNOSIS — R22.43 LOCALIZED SWELLING OF BOTH LOWER LEGS: ICD-10-CM

## 2021-03-29 DIAGNOSIS — C49.A2 MALIGNANT GASTROINTESTINAL STROMAL TUMOR (GIST) OF STOMACH (HCC): ICD-10-CM

## 2021-03-29 DIAGNOSIS — K92.1 MELENA: ICD-10-CM

## 2021-03-29 RX ORDER — PANTOPRAZOLE SODIUM 20 MG/1
20 TABLET, DELAYED RELEASE ORAL DAILY
Qty: 30 TABLET | Refills: 1 | Status: SHIPPED | OUTPATIENT
Start: 2021-03-29 | End: 2021-05-24

## 2021-03-29 NOTE — TELEPHONE ENCOUNTER
Spoke to patient's daughter, Tamara Ricardo, and she verified that patient does need her pantoprazole 20 mg refilled as soon as possible

## 2021-03-30 RX ORDER — PANTOPRAZOLE SODIUM 20 MG/1
20 TABLET, DELAYED RELEASE ORAL DAILY
Qty: 30 TABLET | Refills: 0 | OUTPATIENT
Start: 2021-03-30

## 2021-03-31 RX ORDER — HYDROCHLOROTHIAZIDE 25 MG/1
12.5 TABLET ORAL DAILY PRN
Qty: 20 TABLET | Refills: 0 | Status: SHIPPED | OUTPATIENT
Start: 2021-03-31 | End: 2021-05-06

## 2021-03-31 RX ORDER — ONDANSETRON 4 MG/1
TABLET, ORALLY DISINTEGRATING ORAL
Qty: 60 TABLET | Refills: 1 | Status: SHIPPED | OUTPATIENT
Start: 2021-03-31 | End: 2021-06-19

## 2021-03-31 RX ORDER — ONDANSETRON 4 MG/1
4 TABLET, ORALLY DISINTEGRATING ORAL
Qty: 90 TABLET | Refills: 0 | Status: SHIPPED | OUTPATIENT
Start: 2021-03-31 | End: 2021-05-03

## 2021-04-21 ENCOUNTER — APPOINTMENT (OUTPATIENT)
Dept: LAB | Facility: CLINIC | Age: 81
End: 2021-04-21
Payer: MEDICARE

## 2021-04-21 DIAGNOSIS — C49.A2 MALIGNANT GASTROINTESTINAL STROMAL TUMOR (GIST) OF STOMACH (HCC): ICD-10-CM

## 2021-04-21 DIAGNOSIS — D50.0 IRON DEFICIENCY ANEMIA DUE TO CHRONIC BLOOD LOSS: ICD-10-CM

## 2021-04-21 LAB
ALBUMIN SERPL BCP-MCNC: 3.3 G/DL (ref 3.5–5)
ALP SERPL-CCNC: 106 U/L (ref 46–116)
ALT SERPL W P-5'-P-CCNC: 16 U/L (ref 12–78)
ANION GAP SERPL CALCULATED.3IONS-SCNC: 5 MMOL/L (ref 4–13)
AST SERPL W P-5'-P-CCNC: 19 U/L (ref 5–45)
BILIRUB SERPL-MCNC: 0.66 MG/DL (ref 0.2–1)
BUN SERPL-MCNC: 12 MG/DL (ref 5–25)
CALCIUM ALBUM COR SERPL-MCNC: 9.4 MG/DL (ref 8.3–10.1)
CALCIUM SERPL-MCNC: 8.8 MG/DL (ref 8.3–10.1)
CHLORIDE SERPL-SCNC: 108 MMOL/L (ref 100–108)
CO2 SERPL-SCNC: 27 MMOL/L (ref 21–32)
CREAT SERPL-MCNC: 0.59 MG/DL (ref 0.6–1.3)
FERRITIN SERPL-MCNC: 18 NG/ML (ref 8–388)
GFR SERPL CREATININE-BSD FRML MDRD: 87 ML/MIN/1.73SQ M
GLUCOSE P FAST SERPL-MCNC: 83 MG/DL (ref 65–99)
IRON SATN MFR SERPL: 24 %
IRON SERPL-MCNC: 93 UG/DL (ref 50–170)
POTASSIUM SERPL-SCNC: 3.9 MMOL/L (ref 3.5–5.3)
PROT SERPL-MCNC: 5.8 G/DL (ref 6.4–8.2)
SODIUM SERPL-SCNC: 140 MMOL/L (ref 136–145)
TIBC SERPL-MCNC: 385 UG/DL (ref 250–450)

## 2021-04-21 PROCEDURE — 83550 IRON BINDING TEST: CPT

## 2021-04-21 PROCEDURE — 83540 ASSAY OF IRON: CPT

## 2021-04-21 PROCEDURE — 82728 ASSAY OF FERRITIN: CPT

## 2021-04-21 PROCEDURE — 80053 COMPREHEN METABOLIC PANEL: CPT

## 2021-04-21 PROCEDURE — 36415 COLL VENOUS BLD VENIPUNCTURE: CPT | Performed by: NURSE PRACTITIONER

## 2021-04-28 ENCOUNTER — HOSPITAL ENCOUNTER (OUTPATIENT)
Dept: CT IMAGING | Facility: HOSPITAL | Age: 81
Discharge: HOME | End: 2021-04-28
Attending: SURGERY
Payer: MEDICARE

## 2021-04-28 DIAGNOSIS — C49.A2 MALIGNANT GASTROINTESTINAL STROMAL TUMOR (GIST) OF STOMACH (HCC): ICD-10-CM

## 2021-04-28 PROCEDURE — 71260 CT THORAX DX C+: CPT

## 2021-04-28 PROCEDURE — G1004 CDSM NDSC: HCPCS

## 2021-04-28 PROCEDURE — 74177 CT ABD & PELVIS W/CONTRAST: CPT

## 2021-04-28 RX ADMIN — IOHEXOL 85 ML: 350 INJECTION, SOLUTION INTRAVENOUS at 11:00

## 2021-05-03 ENCOUNTER — OFFICE VISIT (OUTPATIENT)
Dept: PALLIATIVE MEDICINE | Facility: CLINIC | Age: 81
End: 2021-05-03
Payer: MEDICARE

## 2021-05-03 VITALS
OXYGEN SATURATION: 99 % | WEIGHT: 99.21 LBS | TEMPERATURE: 97.2 F | HEART RATE: 88 BPM | DIASTOLIC BLOOD PRESSURE: 68 MMHG | RESPIRATION RATE: 18 BRPM | BODY MASS INDEX: 19.38 KG/M2 | SYSTOLIC BLOOD PRESSURE: 118 MMHG

## 2021-05-03 DIAGNOSIS — G31.84 MILD COGNITIVE IMPAIRMENT WITH MEMORY LOSS: Primary | Chronic | ICD-10-CM

## 2021-05-03 PROCEDURE — 99214 OFFICE O/P EST MOD 30 MIN: CPT | Performed by: FAMILY MEDICINE

## 2021-05-03 NOTE — PROGRESS NOTES
Outpatient Follow-Up - Palliative and Supportive Care   Lala Lassiter [de-identified] y o  female [id number]    Assessment & Plan  No diagnosis found   - Counseling on health screening and disease prevention, COVID-19 specific (CPT V65 49)    Medications adjusted this encounter:  Requested Prescriptions      No prescriptions requested or ordered in this encounter     No orders of the defined types were placed in this encounter  Medications Discontinued During This Encounter   Medication Reason    ondansetron (ZOFRAN-ODT) 4 mg disintegrating tablet     potassium chloride (K-DUR,KLOR-CON) 20 mEq tablet          Lala Lassiter was seen today for symptoms and planning cares related to above illnesses  I have reviewed the patient's controlled substance dispensing history in the Prescription Drug Monitoring Program in compliance with the [facility] regulations before prescribing any controlled substances  They are invited to continue to follow with us  If there are questions or concerns, please contact us through our clinic/answering service 24 hours a day, seven days a week  Rosalina Garcia MD  [facility] Palliative and Supportive [facility]        Visit Information    Accompanied By: Family member    Source of History: Self, Family member    History Limitations: None    Contacts: son Luda Gaitan    History of Present Illness      Lala Lassiter is a [de-identified] y o  female who presents in follow up of symptoms related to GIST of stomach  Pertinent issues include: symptom management, nausea      Since last visit, she has been stable with pain, anxiety, and sleep  Weight and appetite are in good shape  She continues to take [address] without any perceived nor reported adverse effects, either of treatment or her disease process  Briefly today we noted ongoing memory challenges, sufficient enough to interrupt and complicate simple conversation with her family    She is no longer driving, and when she has an external record of things, she endorses ongoing ability to use a checkbook to balance finances  However, her daughter notes that she must be cued and supported to complete more complex self-care tasks that are not routinized, such as showering  Since her pain and other symptoms are well-controlled, and since her primary concern at this point is a possible demented process for which she might benefit from Namenda or Aricept, we would defer back to primary care  She may follow with us PRN  Recently, she has continued to improve; she wishes to continue to taper medicines off her regimen, and we discussed that -- this far out from her GIST dx and treatment -- she may not need to continue high-dose PPI  This is akiko true as we work to improve her hgb counts with enteral Fe  Otherwise, her pain is non-existent, more or less, and her nausea and appetite slowly continue to improve  There is a persistent and annoying challenge with BLE swelling up to the ankles  We agreed to trial low dose diuretic for this, on an intermittent / PRN basis  Since our last visit, she has done well, physically  She denies any particular challenges, pains, nor symptoms today  She does note that she has some worsening mental fog or forgetfulness as she continues with immunotherapy  We discussed that this is not uncommon with chemo treatment, and may improve over time, as her illness is treated and she begins to improve  Importantly, her cognitive symptoms may improved when immunotherapies are stopped  She denies any focal deficits  She has stopped a number of her medications, akiko steroids, as they were not making a noticeable difference in her symptom control  Past medical, surgical, social, and family histories are reviewed and pertinent updates are made  Review of Systems   Constitution: Negative for decreased appetite, weight gain and weight loss  HENT: Negative for hoarse voice and nosebleeds      Eyes: Negative for vision loss in left eye and vision loss in right eye  Cardiovascular: Negative for chest pain and dyspnea on exertion  Respiratory: Negative for cough and shortness of breath  Endocrine: Negative for polydipsia, polyphagia and polyuria  Skin: Negative for flushing and itching  Musculoskeletal: Negative for falls  Gastrointestinal: Negative for anorexia, jaundice, nausea and vomiting  Genitourinary: Negative for frequency  Neurological: Negative for dizziness  Psychiatric/Behavioral: Negative for depression and memory loss  The patient is not nervous/anxious  Vital Signs    /68 (BP Location: Left arm, Patient Position: Sitting, Cuff Size: Standard)   Pulse 88   Temp (!) 97 2 °F (36 2 °C) (Temporal)   Resp 18   Wt 45 kg (99 lb 3 3 oz)   SpO2 99%   BMI 19 38 kg/m²     Physical Exam and Objective Data  Physical Exam  Constitutional:       General: She is not in acute distress  Appearance: She is ill-appearing  She is not toxic-appearing or diaphoretic  Comments: frail   HENT:      Head: Normocephalic and atraumatic  Right Ear: External ear normal       Left Ear: External ear normal       Mouth/Throat:      Comments: Mask not removed today  Eyes:      General:         Right eye: No discharge  Left eye: No discharge  Conjunctiva/sclera: Conjunctivae normal       Pupils: Pupils are equal, round, and reactive to light  Neck:      Trachea: No tracheal deviation  Cardiovascular:      Rate and Rhythm: Regular rhythm  Tachycardia present  Pulmonary:      Effort: Pulmonary effort is normal  No respiratory distress  Breath sounds: No stridor  Abdominal:      General: There is no distension  Palpations: Abdomen is soft  Comments: Scaphoid   Skin:     General: Skin is warm and dry  Findings: No erythema or rash             Radiology and Laboratory:  I personally reviewed and interpreted the following results: none new    25 minutes was spent face to face with Mouna Sanders and her family with greater than 50% of the time spent in counseling or coordination of care including: chart review; symptom pursuit; supportive listening; anticipatory guidance     Additional time was also spent in discussing coronavirus vaccine indications, availability, and logistical challenges  All of the patient's or agent's questions were answered during this discussion

## 2021-05-03 NOTE — PATIENT INSTRUCTIONS
[address]    Your healthcare provider and/or public health staff have evaluated you and have determined that you do not need to remain in the hospital at this time  At this time you can be isolated at home where you will be monitored by staff from your local or state health department  You should carefully follow the prevention and isolation steps below until a healthcare provider or local or state health department says that you can return to your normal activities  Stay home except to get medical care    People who are mildly ill with COVID-19 are able to isolate at home during their illness  You should restrict activities outside your home, except for getting medical care  Do not go to work, school, or public areas  Avoid using public transportation, ride-sharing, or taxis  Separate yourself from other people and animals in your home    People: As much as possible, you should stay in a specific room and away from other people in your home  Also, you should use a separate bathroom, if available  Animals: You should restrict contact with pets and other animals while you are sick with COVID-19, just like you would around other people  Although there have not been reports of pets or other animals becoming sick with COVID-19, it is still recommended that people sick with COVID-19 limit contact with animals until more information is known about the virus  When possible, have another member of your household care for your animals while you are sick  If you are sick with COVID-19, avoid contact with your pet, including petting, snuggling, being kissed or licked, and sharing food  If you must care for your pet or be around animals while you are sick, wash your hands before and after you interact with pets and wear a facemask  See COVID-19 and Animals for more information      Call ahead before visiting your doctor    If you have a medical appointment, call the healthcare provider and tell them that you have or may have COVID-19  This will help the healthcare providers office take steps to keep other people from getting infected or exposed  Wear a facemask    You should wear a facemask when you are around other people (e g , sharing a room or vehicle) or pets and before you enter a healthcare providers office  If you are not able to wear a facemask (for example, because it causes trouble breathing), then people who live with you should not stay in the same room with you, or they should wear a facemask if they enter your room  Cover your coughs and sneezes    Cover your mouth and nose with a tissue when you cough or sneeze  Throw used tissues in a lined trash can  Immediately wash your hands with soap and water for at least 20 seconds or, if soap and water are not available, clean your hands with an alcohol-based hand  that contains at least 60% alcohol  Clean your hands often    Wash your hands often with soap and water for at least 20 seconds, especially after blowing your nose, coughing, or sneezing; going to the bathroom; and before eating or preparing food  If soap and water are not readily available, use an alcohol-based hand  with at least 60% alcohol, covering all surfaces of your hands and rubbing them together until they feel dry  Soap and water are the best option if hands are visibly dirty  Avoid touching your eyes, nose, and mouth with unwashed hands  Avoid sharing personal household items    You should not share dishes, drinking glasses, cups, eating utensils, towels, or bedding with other people or pets in your home  After using these items, they should be washed thoroughly with soap and water  Clean all high-touch surfaces everyday    High touch surfaces include counters, tabletops, doorknobs, bathroom fixtures, toilets, phones, keyboards, tablets, and bedside tables  Also, clean any surfaces that may have blood, stool, or body fluids on them   Use a household cleaning spray or wipe, according to the label instructions  Labels contain instructions for safe and effective use of the cleaning product including precautions you should take when applying the product, such as wearing gloves and making sure you have good ventilation during use of the product  Monitor your symptoms    Seek prompt medical attention if your illness is worsening (e g , difficulty breathing)  Before seeking care, call your healthcare provider and tell them that you have, or are being evaluated for, COVID-19  Put on a facemask before you enter the facility  These steps will help the healthcare providers office to keep other people in the office or waiting room from getting infected or exposed  Ask your healthcare provider to call the local or [facility] health department  Persons who are placed under active monitoring or facilitated self-monitoring should follow instructions provided by their local health department or occupational health professionals, as appropriate  If you have a medical emergency and need to call 911, notify the dispatch personnel that you have, or are being evaluated for COVID-19  If possible, put on a facemask before emergency medical services arrive      Discontinuing home isolation    Patients with confirmed COVID-19 should remain under home isolation precautions until the following conditions are met:   - They have had no fever for at least 24 hours (that is one full day of no fever without the use medicine that reduces fevers)  AND  - other symptoms have improved (for example, when their cough or shortness of breath have improved)  AND  - If had mild or moderate illness, at least 10 days have passed since their symptoms first appeared or if severe illness (needed oxygen) or immunosuppressed, at least 20 days have passed since symptoms first appeared  Patients with confirmed COVID-19 should also notify close contacts (including their workplace) and ask that they self-quarantine  Currently, close contact is defined as being within 6 feet for 15 minutes or more from the period 24 hours starting 48 hours before symptom onset to the time at which the patient went into isolation  Close contacts of patients diagnosed with COVID-19 should be instructed by the patient to self-quarantine for 14 days from the last time of their last contact with the patient  Source: [employer]  PRESCRIPTION REFILL REMINDER:  All medication refills should be requested prior to Noon on Friday  Any refill requests after noon on Friday would be addressed the following Monday  Please protect yourself from COVID-19! Even though we do not good antiviral drugs for this infection, the following strategies can help you stay healthy:    = Wash your hands! Soap and water, or hand  with at least 60% alcohol, are both effective at killing the virus  = Wear a mask! This will help protect others from any virus particles you might spread  Your mouth and nose BOTH need to be covered  = Keep the distance! Keep 6 feet of distance from others people, even if they seem healthy  Keeping distance protects you from the other person's virus spread     = Get a vaccine! Two vaccines are approved for emergency use in the [place], made by [employer], and Barbara Pillar  (At this time, 4/13/21, Caity Solorio is under additional investigation for side effects; we do not recommend that our patients get J&J )  These vaccines have been shown to be 90+% effective at preventing severe infections when combined with masking, hand-washing, and distancing    As of 4/19/2021, ALL adults may get a vaccine!  + Pfizer may be given to all adults age 12 and older   + Barbara Pillar may be given to all adults age 25 and older   = You may get a shot from many other locations outside [facility]: [facility], [employer], [facility], [facility], and certain [facility] locations  We are recommending that ALL our patients get a complete series of one of the two vaccines, as early as it is available to them  Please keep an eye on the [employer], [place], or call 1-776-CDFBTQD (Choose Option 7 for faster service!) to see when you will become eligible  If you are eligible by the criteria above, please ask our team to help get you a shot! Informacion en espanol sobre vacunas, de nos companeros de [facility] --  Leann gu      Numbers of Coronavirus cases (and COVID deaths) are spiking in many  States, and rates of transmission are still high  This means that many people in a community are still spreading the virus  Please check the local disease reports near you if you consider travelling  As of 4/1/21, we do NOT advise travel outside the [place] campus ([place] or [place]) "    Check out [employer] for Bo data that are updated daily:    http://www [employer]/     Global Epidemics  Org, from [facility] will give you Faoxrp-pp-[id number] information on virus cases:    Https://globalepidemics  org/

## 2021-05-03 NOTE — Clinical Note
Pt might realisticallly 'graduate' from Palliative at this time; she is not using any controlled substances for intractable symptoms  We did note ongoing memory issues for which dementia management with pharmacology or home OT might be helfpul  Will defer to your management and expertise on this matter  Pt may follow with us PRN

## 2021-05-04 PROBLEM — G31.84 MILD COGNITIVE IMPAIRMENT WITH MEMORY LOSS: Chronic | Status: ACTIVE | Noted: 2021-05-04

## 2021-05-06 ENCOUNTER — OFFICE VISIT (OUTPATIENT)
Dept: HEMATOLOGY ONCOLOGY | Facility: CLINIC | Age: 81
End: 2021-05-06
Payer: MEDICARE

## 2021-05-06 VITALS
OXYGEN SATURATION: 99 % | WEIGHT: 101 LBS | DIASTOLIC BLOOD PRESSURE: 66 MMHG | SYSTOLIC BLOOD PRESSURE: 136 MMHG | HEIGHT: 60 IN | HEART RATE: 104 BPM | TEMPERATURE: 98 F | BODY MASS INDEX: 19.83 KG/M2 | RESPIRATION RATE: 16 BRPM

## 2021-05-06 DIAGNOSIS — R22.43 LOCALIZED SWELLING OF BOTH LOWER LEGS: ICD-10-CM

## 2021-05-06 DIAGNOSIS — D50.0 IRON DEFICIENCY ANEMIA DUE TO CHRONIC BLOOD LOSS: ICD-10-CM

## 2021-05-06 DIAGNOSIS — C49.A2 MALIGNANT GASTROINTESTINAL STROMAL TUMOR (GIST) OF STOMACH (HCC): Primary | ICD-10-CM

## 2021-05-06 PROCEDURE — 99214 OFFICE O/P EST MOD 30 MIN: CPT | Performed by: NURSE PRACTITIONER

## 2021-05-06 RX ORDER — FERROUS SULFATE TAB EC 324 MG (65 MG FE EQUIVALENT) 324 (65 FE) MG
324 TABLET DELAYED RESPONSE ORAL
Qty: 30 TABLET | Refills: 6 | Status: SHIPPED | OUTPATIENT
Start: 2021-05-06 | End: 2022-01-19 | Stop reason: SDUPTHER

## 2021-05-06 RX ORDER — HYDROCHLOROTHIAZIDE 25 MG/1
12.5 TABLET ORAL DAILY PRN
Qty: 20 TABLET | Refills: 0 | Status: SHIPPED | OUTPATIENT
Start: 2021-05-06 | End: 2022-05-24

## 2021-05-06 NOTE — PROGRESS NOTES
[address] [facility] [place]  [address] [facility] [address] [place] [id number] [phone]  Progress Note  Gene Vázquez, 1940, [id number]  5/6/2021    Assessment/Plan:  1  Iron deficiency anemia due to chronic blood loss  2  Malignant gastrointestinal stromal tumor (GIST) of stomach ([address] )    Patient is an 51-year-old female with a history of gist tumor, currently on Gleevec 400 mg p o  daily  She is tolerating this without difficulty reporting only mild nausea which is managed with ondansetron  We reviewed her recent CT scan of the chest abdomen and pelvis that was performed for restaging  The re-identified partially exophytic gastric mass is decreased in size from prior dimensions of 9 3 x 8 6 x 14 cm to 6 5 x 6 4 x 10 8 cm  There are new left hepatic hypodensities measuring 6 mm and 5 mm and a previously seen right hepatic hypodensity is enlarged to 11 mm  At this time we will continue to monitor  I reviewed with Dr Scott Saba, surgical oncologist who will see patient next week and likely order a repeat CT as patient is not a surgical candidate at this time  We will otherwise continue treatment with Gleevec at the same dosage, given the decrease in size of the gastric mass  We also reviewed patient's recent blood work which reveals a hemoglobin of 11 2 and otherwise stable CBC with differential, metabolic panel reveals a creatinine of 0 59 with a GFR of 87  Her total protein and albumin are low at 5 8 and 3 3, we will continue to monitor although I did instruct patient to increase her protein intake  Patient has been on oral iron supplement for iron deficiency anemia  Her recent saturation has improved from 13% to 24% within a month and ferritin level is stable at 18  I instructed her to continue taking the oral iron as previously prescribed     Overall patient is doing well and able to function without restriction although  Her daughter reports some cognitive changes with memory  Her daughter mentioned this to palliative care who referred her back to her PCP for further evaluation and treatment  We will anticipate seeing her in 2 months with repeat blood work  Patient and her daughter Verbalized understanding and are in agreement with  The plan  - ferrous sulfate 324 (65 Fe) mg; Take 1 tablet (324 mg total) by mouth daily before breakfast  Dispense: 30 tablet; Refill: 6  - CBC and differential; Future  - Comprehensive metabolic panel; Future  - Iron Panel (Includes Ferritin, Iron Sat%, Iron, and TIBC); Future  - CBC and differential; Future  - Comprehensive metabolic panel; Future    Goals and Barriers:    Current Goal:   Prolong Survival from Cancer  Barriers: None  Patient's Capacity to Self Care:  Patient  is able to self care   -------------------------------------------------------------------------------------------------------    Chief Complaint   Patient presents with    Follow-up       History of present illness/Cancer History:   Oncology History   GIST (gastrointestinal stromal tumor), malignant ([facility] [address] )   2020 Biopsy    Gastric mass FNA:  - Positive for neoplasm consistent with a gastrointestinal stromal tumor (GIST)          Cancer Staging  No matching staging information was found for the patient  ECO - Symptomatic but completely ambulatory    Interval history:   Clinically stable     Review of Systems   Constitutional: Positive for fatigue  Negative for activity change, appetite change, fever and unexpected weight change  Respiratory: Negative for cough and shortness of breath  Cardiovascular: Negative for chest pain and leg swelling  Gastrointestinal: Positive for nausea  Negative for abdominal pain, constipation and diarrhea  Endocrine: Negative for cold intolerance and heat intolerance  Musculoskeletal: Negative for arthralgias and myalgias  Skin: Negative      Neurological: Negative for dizziness, weakness and headaches  Hematological: Negative for adenopathy  Does not bruise/bleed easily  Current Outpatient Medications:     ferrous sulfate 324 (65 Fe) mg, Take 1 tablet (324 mg total) by mouth daily before breakfast, Disp: 30 tablet, Rfl: 6    hydrochlorothiazide (HYDRODIURIL) 25 mg tablet, TAKE 0 5 TABLETS (12 5 MG TOTAL) BY MOUTH DAILY AS NEEDED (FOOT SWELLING OR PUFFINESS), Disp: 20 tablet, Rfl: 0    imatinib (GLEEVEC) 400 mg tablet, Take 1 tablet (400 mg total) by mouth daily, Disp: 30 tablet, Rfl: 4    loratadine (CLARITIN) 10 mg tablet, Take by mouth, Disp: , Rfl:     ondansetron (ZOFRAN-ODT) 4 mg disintegrating tablet, TAKE 1 TABLET (4 MG TOTAL) BY MOUTH EVERY 6 (SIX) HOURS AS NEEDED FOR NAUSEA OR VOMITING, Disp: 60 tablet, Rfl: 1    pantoprazole (PROTONIX) 20 mg tablet, Take 1 tablet (20 mg total) by mouth daily To prevent heartburn, Disp: 30 tablet, Rfl: 1    traMADol (ULTRAM) 50 mg tablet, Take 1 tablet (50 mg total) by mouth every 6 (six) hours as needed (cancer pain  Max 4 tabs a day ), Disp: 120 tablet, Rfl: 0    Allergies   Allergen Reactions    Bactrim [Sulfamethoxazole-Trimethoprim]     Simvastatin Myalgia       Advance Directive and Living Will:        Objective:   /66 (BP Location: Left arm)   Pulse 104   Temp 98 °F (36 7 °C) (Temporal)   Resp 16   Ht 5' (1 524 m)   Wt 45 8 kg (101 lb)   SpO2 99%   BMI 19 73 kg/m²   Wt Readings from Last 6 Encounters:   05/06/21 45 8 kg (101 lb)   05/03/21 45 kg (99 lb 3 3 oz)   03/11/21 43 5 kg (96 lb)   03/03/21 45 1 kg (99 lb 6 8 oz)   02/11/21 45 4 kg (100 lb)   01/28/21 42 9 kg (94 lb 9 2 oz)       Physical Exam  Vitals signs reviewed  Constitutional:       Appearance: Normal appearance  She is well-developed  HENT:      Head: Normocephalic and atraumatic  Eyes:      Extraocular Movements: Extraocular movements intact  Conjunctiva/sclera: Conjunctivae normal       Pupils: Pupils are equal, round, and reactive to light  Neck:      Musculoskeletal: Normal range of motion and neck supple  Cardiovascular:      Rate and Rhythm: Normal rate and regular rhythm  Pulses: Normal pulses  Heart sounds: Normal heart sounds  No murmur  Pulmonary:      Effort: Pulmonary effort is normal  No respiratory distress  Breath sounds: Normal breath sounds  Abdominal:      General: Bowel sounds are normal       Palpations: Abdomen is soft  Musculoskeletal: Normal range of motion  Lymphadenopathy:      Cervical: No cervical adenopathy  Skin:     General: Skin is warm and dry  Capillary Refill: Capillary refill takes less than 2 seconds  Neurological:      Mental Status: She is alert and oriented to person, place, and time  Psychiatric:         Behavior: Behavior normal          Pertinent Laboratory Results and Imaging Review:  Appointment on 04/21/2021   Component Date Value Ref Range Status    Sodium 04/21/2021 140  136 - 145 mmol/L Final    Potassium 04/21/2021 3 9  3 5 - 5 3 mmol/L Final    Chloride 04/21/2021 108  100 - 108 mmol/L Final    CO2 04/21/2021 27  21 - 32 mmol/L Final    ANION GAP 04/21/2021 5  4 - 13 mmol/L Final    BUN 04/21/2021 12  5 - 25 mg/dL Final    Creatinine 04/21/2021 0 59* 0 60 - 1 30 mg/dL Final    Standardized to IDMS reference method    Glucose, Fasting 04/21/2021 83  65 - 99 mg/dL Final    Specimen collection should occur prior to Sulfasalazine administration due to the potential for falsely depressed results  Specimen collection should occur prior to Sulfapyridine administration due to the potential for falsely elevated results   Calcium 04/21/2021 8 8  8 3 - 10 1 mg/dL Final    Corrected Calcium 04/21/2021 9 4  8 3 - 10 1 mg/dL Final    AST 04/21/2021 19  5 - 45 U/L Final    Specimen collection should occur prior to Sulfasalazine administration due to the potential for falsely depressed results       ALT 04/21/2021 16  12 - 78 U/L Final    Specimen collection should occur prior to Sulfasalazine and/or Sulfapyridine administration due to the potential for falsely depressed results   Alkaline Phosphatase 04/21/2021 106  46 - 116 U/L Final    Total Protein 04/21/2021 5 8* 6 4 - 8 2 g/dL Final    Albumin 04/21/2021 3 3* 3 5 - 5 0 g/dL Final    Total Bilirubin 04/21/2021 0 66  0 20 - 1 00 mg/dL Final    Use of this assay is not recommended for patients undergoing treatment with eltrombopag due to the potential for falsely elevated results   eGFR 04/21/2021 87  ml/min/1 73sq m Final    Iron Saturation 04/21/2021 24  % Final    TIBC 04/21/2021 385  250 - 450 ug/dL Final    Iron 04/21/2021 93  50 - 170 ug/dL Final    Patients treated with metal-binding drugs (ie  Deferoxamine) may have depressed iron values   Ferritin 04/21/2021 18  8 - 388 ng/mL Final         The following historical data was reviewed      Past Medical History:   Diagnosis Date    High cholesterol        Past Surgical History:   Procedure Laterality Date    FL INJECTION LEFT HIP (NON ARTHROGRAM)  5/14/2019       Social History     Socioeconomic History    Marital status: /Civil Union     Spouse name: Not on file    Number of children: Not on file    Years of education: Not on file    Highest education level: Not on file   Occupational History    Not on file   Social Needs    Financial resource strain: Not on file    Food insecurity     Worry: Not on file     Inability: Not on file   [employer] needs     Medical: Not on file     Non-medical: Not on file   Tobacco Use    Smoking status: Never Smoker    Smokeless tobacco: Never Used   Substance and Sexual Activity    Alcohol use: Yes     Comment: occasional    Drug use: Never    Sexual activity: Not on file   Lifestyle    Physical activity     Days per week: Not on file     Minutes per session: Not on file    Stress: Not on file   Relationships    Social connections     Talks on phone: Not on file     Gets together: Not on file Attends Zoroastrian service: Not on file     Active member of club or organization: Not on file     Attends meetings of clubs or organizations: Not on file     Relationship status: Not on file    Intimate partner violence     Fear of current or ex partner: Not on file     Emotionally abused: Not on file     Physically abused: Not on file     Forced sexual activity: Not on file   Other Topics Concern    Not on file   Social History Narrative    Not on file       No family history on file  Please note: This report has been generated by a voice recognition software system  Therefore there may be syntax, spelling, and/or grammatical errors  Please call if you have any questions

## 2021-05-12 ENCOUNTER — OFFICE VISIT (OUTPATIENT)
Dept: SURGICAL ONCOLOGY | Facility: CLINIC | Age: 81
End: 2021-05-12
Payer: MEDICARE

## 2021-05-12 VITALS
RESPIRATION RATE: 18 BRPM | OXYGEN SATURATION: 99 % | BODY MASS INDEX: 19.83 KG/M2 | WEIGHT: 101 LBS | HEART RATE: 90 BPM | SYSTOLIC BLOOD PRESSURE: 122 MMHG | TEMPERATURE: 98.7 F | HEIGHT: 60 IN | DIASTOLIC BLOOD PRESSURE: 66 MMHG

## 2021-05-12 DIAGNOSIS — E46 PROTEIN-CALORIE MALNUTRITION, UNSPECIFIED SEVERITY (HCC): ICD-10-CM

## 2021-05-12 DIAGNOSIS — C49.A2 MALIGNANT GASTROINTESTINAL STROMAL TUMOR (GIST) OF STOMACH (HCC): Primary | ICD-10-CM

## 2021-05-12 PROCEDURE — 99213 OFFICE O/P EST LOW 20 MIN: CPT | Performed by: SURGERY

## 2021-05-12 NOTE — LETTER
May 12, 2021     [address] [place] [address]    Patient: Khadra Basilio   YOB: 1940   Date of Visit: 5/12/2021       Dear Dr Mir Angel:    Thank you for referring Radha Mathias to me for evaluation  Below are my notes for this consultation  If you have questions, please do not hesitate to call me  I look forward to following your patient along with you  Sincerely,        Aviva Burgos MD        CC: MD Aviva Harper MD  5/12/2021 11:10 AM  Sign when Signing Visit     Surgical Oncology Follow Up       [address] [place] [address] [place] [id number]    Khadra Basilio  1940  [id number]  [address] [facility]  [facility] 63 [id number]    Chief Complaint   Patient presents with    Follow-up       Assessment/Plan:    No problem-specific Assessment & Plan notes found for this encounter  Diagnoses and all orders for this visit:    Malignant gastrointestinal stromal tumor (GIST) of stomach ([address] )  -     CT chest abdomen pelvis w contrast; Future  -     Basic metabolic panel; Future    Protein-calorie malnutrition, unspecified severity ([address] )        Advance Care Planning/Advance Directives:  Discussed disease status, cancer treatment plans and/or cancer treatment goals with the patient  Oncology History   GIST (gastrointestinal stromal tumor), malignant ([address] )   12/4/2020 Biopsy    Gastric mass FNA:  - Positive for neoplasm consistent with a gastrointestinal stromal tumor (GIST)     12/30/2020 -  Chemotherapy    Gleevec 400mg PO daily         History of Present Illness:   Patient is an 27-year-old woman here for surveillance visit  She is presently on [address] therapy and is tolerating this fairly well  -Interval History:  No signs or symptoms of obstruction or bleeding    She had a recent CT scan done in anticipation of today's visit  Review of Systems:  Review of Systems   Constitutional: Negative  HENT: Negative  Eyes: Negative  Respiratory: Negative  Cardiovascular: Negative  Gastrointestinal: Negative for abdominal distention, abdominal pain, anal bleeding, blood in stool, constipation, diarrhea, nausea and vomiting  Endocrine: Negative  Genitourinary: Negative  Musculoskeletal: Negative  Skin: Negative  Allergic/Immunologic: Negative  Neurological: Negative  Hematological: Negative  Psychiatric/Behavioral: Negative  Patient Active Problem List   Diagnosis    Sinusitis    Left groin pain    Numbness    Elevated cholesterol    Osteoporosis    Radiculopathy, lumbar region    Lumbar disc herniation    Lumbar spondylosis    Numbness and tingling of both feet    Melena    Iron deficiency anemia due to chronic blood loss    HTN (hypertension)    IV infiltrate, initial encounter    Hypokalemia    SIRS (systemic inflammatory response syndrome) (HCC)    GIST (gastrointestinal stromal tumor), malignant ([facility] [address] [facility] )   [facility] discharge follow-up    Mild cognitive impairment with memory loss     Past Medical History:   Diagnosis Date    High cholesterol      Past Surgical History:   Procedure Laterality Date    FL INJECTION LEFT HIP (NON ARTHROGRAM)  5/14/2019     No family history on file    Social History     Socioeconomic History    Marital status: /Civil Union     Spouse name: Not on file    Number of children: Not on file    Years of education: Not on file    Highest education level: Not on file   Occupational History    Not on file   Social Needs    Financial resource strain: Not on file    Food insecurity     Worry: Not on file     Inability: Not on file   [employer] needs     Medical: Not on file     Non-medical: Not on file   Tobacco Use    Smoking status: Never Smoker    Smokeless tobacco: Never Used   Substance and Sexual Activity    Alcohol use: Yes     Comment: occasional    Drug use: Never    Sexual activity: Not on file   Lifestyle    Physical activity     Days per week: Not on file     Minutes per session: Not on file    Stress: Not on file   Relationships    Social connections     Talks on phone: Not on file     Gets together: Not on file     Attends Confucianism service: Not on file     Active member of club or organization: Not on file     Attends meetings of clubs or organizations: Not on file     Relationship status: Not on file    Intimate partner violence     Fear of current or ex partner: Not on file     Emotionally abused: Not on file     Physically abused: Not on file     Forced sexual activity: Not on file   Other Topics Concern    Not on file   Social History Narrative    Not on file       Current Outpatient Medications:     ferrous sulfate 324 (65 Fe) mg, Take 1 tablet (324 mg total) by mouth daily before breakfast, Disp: 30 tablet, Rfl: 6    hydrochlorothiazide (HYDRODIURIL) 25 mg tablet, TAKE 0 5 TABLETS (12 5 MG TOTAL) BY MOUTH DAILY AS NEEDED (FOOT SWELLING OR PUFFINESS), Disp: 20 tablet, Rfl: 0    imatinib (GLEEVEC) 400 mg tablet, Take 1 tablet (400 mg total) by mouth daily, Disp: 30 tablet, Rfl: 4    loratadine (CLARITIN) 10 mg tablet, Take by mouth, Disp: , Rfl:     ondansetron (ZOFRAN-ODT) 4 mg disintegrating tablet, TAKE 1 TABLET (4 MG TOTAL) BY MOUTH EVERY 6 (SIX) HOURS AS NEEDED FOR NAUSEA OR VOMITING, Disp: 60 tablet, Rfl: 1    pantoprazole (PROTONIX) 20 mg tablet, Take 1 tablet (20 mg total) by mouth daily To prevent heartburn, Disp: 30 tablet, Rfl: 1    traMADol (ULTRAM) 50 mg tablet, Take 1 tablet (50 mg total) by mouth every 6 (six) hours as needed (cancer pain    Max 4 tabs a day ), Disp: 120 tablet, Rfl: 0  Allergies   Allergen Reactions    Bactrim [Sulfamethoxazole-Trimethoprim]     Simvastatin Myalgia     Vitals:    05/12/21 1043   BP: 122/66   Pulse: 90   Resp: 18   Temp: 98 7 °F (37 1 °C)   SpO2: 99%       Physical Exam  Constitutional:       Appearance: Normal appearance  HENT:      Head: Normocephalic and atraumatic  Right Ear: External ear normal       Left Ear: External ear normal       Nose: Nose normal       Mouth/Throat:      Mouth: Mucous membranes are dry  Eyes:      General: No scleral icterus  Neck:      Musculoskeletal: Normal range of motion and neck supple  Cardiovascular:      Rate and Rhythm: Normal rate and regular rhythm  Heart sounds: Normal heart sounds  Pulmonary:      Effort: Pulmonary effort is normal       Breath sounds: Normal breath sounds  Abdominal:      General: Abdomen is flat  Bowel sounds are normal  There is no distension  Palpations: Abdomen is soft  There is no mass  Tenderness: There is no abdominal tenderness  There is no guarding or rebound  Hernia: No hernia is present  Musculoskeletal: Normal range of motion  Skin:     General: Skin is warm and dry  Neurological:      General: No focal deficit present  Mental Status: She is alert and oriented to person, place, and time  Psychiatric:         Mood and Affect: Mood normal          Behavior: Behavior normal          Thought Content: Thought content normal          Judgment: Judgment normal            Results:  Labs:  none    Imaging  Ct Chest Abdomen Pelvis W Contrast    Result Date: 5/3/2021  Narrative: CT CHEST, ABDOMEN AND PELVIS WITH IV CONTRAST INDICATION:   C49  A2: Gastrointestinal stromal tumor of stomach  Excerpt from Hematology and Oncology progress note dated 12/30/2020:  "Summary, this is an 70-year-old female history of recently diagnosed gastric GIST tumor "  "We reviewed that surgical resection followed by consideration for medical therapy is the generally recommended path  She is not inclined to have surgical resection  Given that medical therapy is generally effective, this seems like a reasonable option    Reconsideration for surgery or continuation of medical therapy could be carried out after restaging "  "Reimaging 2-3 months after treatment initiation is recommended  " COMPARISON:  CT abdomen pelvis 12/2/2020 12/1/2020 and 8/11/2009  TECHNIQUE: CT examination of the chest, abdomen and pelvis was performed  Axial, sagittal, and coronal 2D reformatted images were created from the source data and submitted for interpretation  Radiation dose length product (DLP) for this visit:  739 mGy-cm   This examination, like all CT scans performed in the [facility], was performed utilizing techniques to minimize radiation dose exposure, including the use of iterative reconstruction and automated exposure control  IV Contrast:  85 mL of iohexol (OMNIPAQUE) Enteric Contrast: Enteric contrast was administered  FINDINGS: CHEST STOMACH AND BOWEL:  Reidentified partially exophytic gastric mass measuring 6 5 x 6 4 x 10 8 cm decreased in size from prior dimensions of 9 3 x 8 6 x 14 0 cm  #6/14 and #601/61  LUNGS:  Small left pleural effusion new since the prior study  The above-described mass abuts the medial aspect of the left hemidiaphragm #601/61  Mild biapical pleural/parenchymal scarring  PLEURA:  Small left pleural effusion new since the prior study  HEART/GREAT VESSELS:  Posterior pericardial effusion measuring approximately 11 mm in maximum thickness  #6/1  MEDIASTINUM AND DEVAN:  Unremarkable  CHEST WALL AND LOWER NECK:   Enlarged thyroid with multiple subcentimeter hypodensities; these do not meet size criteria necessitating follow-up    ABDOMEN LIVER/BILIARY TREE:  New 6 mm peripheral left hepatic hypodensity #6/10 and #601/43  11 mm segment 8 hypodensity #6/14 was not well visualized on prior studies but likely measured approximately 7 mm on the 12/2/2020 study  3 mm segment 8 hypodensity #6/74 is unchanged since 2009  5 mm segment 4A hepatic hypodensity #6/9  GALLBLADDER:  Decompressed with gallstones   No pericholecystic inflammatory changes  SPLEEN:  Unremarkable  PANCREAS:  Unremarkable  ADRENAL GLANDS:  Unremarkable  KIDNEYS/URETERS:  Unremarkable  No hydronephrosis  APPENDIX:  No findings to suggest appendicitis  ABDOMINOPELVIC CAVITY:  No ascites  No pneumoperitoneum  No lymphadenopathy  VESSELS:  Unremarkable for patient's age  PELVIS REPRODUCTIVE ORGANS:  Unremarkable for patient's age  URINARY BLADDER:  Unremarkable  ABDOMINAL WALL/INGUINAL REGIONS:  Unremarkable  OSSEOUS STRUCTURES:  No acute fracture or destructive osseous lesion  Impression: Reidentified partially exophytic gastric mass measuring 6 5 x 6 4 x 10 8 cm decreased in size from prior dimensions of 9 3 x 8 6 x 14 0 cm  #6/14 and #601/61  New 6 and 5 mm left hepatic hypodensities  Enlarged 11 mm right hepatic hypodensity  Small pleural effusion and small posterior pericardial effusion  The study was marked in EPIC for significant notification  Workstation performed: [id number]     I reviewed the above laboratory and imaging data  Discussion/Summary:  Large gastrointestinal stromal tumor, which is responding to [address]  New small hepatic lesions of undetermined etiology  Will continue [address] for now and reassess the next 3-4 months  We discussed possibly surgery down the road, though given that she continues to have favorable response to therapy, would try to minimize potential extent of surgery/ resection with continued chemotherapy  All questions answered and copy report given to patient for records

## 2021-05-12 NOTE — PROGRESS NOTES
Surgical Oncology Follow Up       [address] [facility] SURGICAL ONCOLOGY [place]  [address] [facility] [id number]    Ailyn Maritza  1940  [id number]  [address] [facility] SURGICAL ONCOLOGY [place]  [address] [facility] [address]    Chief Complaint   Patient presents with    Follow-up       Assessment/Plan:    No problem-specific Assessment & Plan notes found for this encounter  Diagnoses and all orders for this visit:    Malignant gastrointestinal stromal tumor (GIST) of stomach ([address] )  -     CT chest abdomen pelvis w contrast; Future  -     Basic metabolic panel; Future    Protein-calorie malnutrition, unspecified severity ([address] )        Advance Care Planning/Advance Directives:  Discussed disease status, cancer treatment plans and/or cancer treatment goals with the patient  Oncology History   GIST (gastrointestinal stromal tumor), malignant ([facility] [address] )   12/4/2020 Biopsy    Gastric mass FNA:  - Positive for neoplasm consistent with a gastrointestinal stromal tumor (GIST)     12/30/2020 -  Chemotherapy    Gleevec 400mg PO daily         History of Present Illness:   Patient is an 24-year-old woman here for surveillance visit  She is presently on [address] therapy and is tolerating this fairly well  -Interval History:  No signs or symptoms of obstruction or bleeding  She had a recent CT scan done in anticipation of today's visit  Review of Systems:  Review of Systems   Constitutional: Negative  HENT: Negative  Eyes: Negative  Respiratory: Negative  Cardiovascular: Negative  Gastrointestinal: Negative for abdominal distention, abdominal pain, anal bleeding, blood in stool, constipation, diarrhea, nausea and vomiting  Endocrine: Negative  Genitourinary: Negative  Musculoskeletal: Negative  Skin: Negative  Allergic/Immunologic: Negative  Neurological: Negative  Hematological: Negative  Psychiatric/Behavioral: Negative  Patient Active Problem List   Diagnosis    Sinusitis    Left groin pain    Numbness    Elevated cholesterol    Osteoporosis    Radiculopathy, lumbar region    Lumbar disc herniation    Lumbar spondylosis    Numbness and tingling of both feet    Melena    Iron deficiency anemia due to chronic blood loss    HTN (hypertension)    IV infiltrate, initial encounter    Hypokalemia    SIRS (systemic inflammatory response syndrome) (HCC)    GIST (gastrointestinal stromal tumor), malignant ([facility] [address] )   [facility] discharge follow-up    Mild cognitive impairment with memory loss     Past Medical History:   Diagnosis Date    High cholesterol      Past Surgical History:   Procedure Laterality Date    FL INJECTION LEFT HIP (NON ARTHROGRAM)  5/14/2019     No family history on file    Social History     Socioeconomic History    Marital status: /Civil Union     Spouse name: Not on file    Number of children: Not on file    Years of education: Not on file    Highest education level: Not on file   Occupational History    Not on file   Social Needs    Financial resource strain: Not on file    Food insecurity     Worry: Not on file     Inability: Not on file   [employer] needs     Medical: Not on file     Non-medical: Not on file   Tobacco Use    Smoking status: Never Smoker    Smokeless tobacco: Never Used   Substance and Sexual Activity    Alcohol use: Yes     Comment: occasional    Drug use: Never    Sexual activity: Not on file   Lifestyle    Physical activity     Days per week: Not on file     Minutes per session: Not on file    Stress: Not on file   Relationships    Social connections     Talks on phone: Not on file     Gets together: Not on file     Attends Zoroastrian service: Not on file     Active member of club or organization: Not on file     Attends meetings of clubs or organizations: Not on file     Relationship status: Not on file   Manju Martell Intimate partner violence     Fear of current or ex partner: Not on file     Emotionally abused: Not on file     Physically abused: Not on file     Forced sexual activity: Not on file   Other Topics Concern    Not on file   Social History Narrative    Not on file       Current Outpatient Medications:     ferrous sulfate 324 (65 Fe) mg, Take 1 tablet (324 mg total) by mouth daily before breakfast, Disp: 30 tablet, Rfl: 6    hydrochlorothiazide (HYDRODIURIL) 25 mg tablet, TAKE 0 5 TABLETS (12 5 MG TOTAL) BY MOUTH DAILY AS NEEDED (FOOT SWELLING OR PUFFINESS), Disp: 20 tablet, Rfl: 0    imatinib (GLEEVEC) 400 mg tablet, Take 1 tablet (400 mg total) by mouth daily, Disp: 30 tablet, Rfl: 4    loratadine (CLARITIN) 10 mg tablet, Take by mouth, Disp: , Rfl:     ondansetron (ZOFRAN-ODT) 4 mg disintegrating tablet, TAKE 1 TABLET (4 MG TOTAL) BY MOUTH EVERY 6 (SIX) HOURS AS NEEDED FOR NAUSEA OR VOMITING, Disp: 60 tablet, Rfl: 1    pantoprazole (PROTONIX) 20 mg tablet, Take 1 tablet (20 mg total) by mouth daily To prevent heartburn, Disp: 30 tablet, Rfl: 1    traMADol (ULTRAM) 50 mg tablet, Take 1 tablet (50 mg total) by mouth every 6 (six) hours as needed (cancer pain  Max 4 tabs a day ), Disp: 120 tablet, Rfl: 0  Allergies   Allergen Reactions    Bactrim [Sulfamethoxazole-Trimethoprim]     Simvastatin Myalgia     Vitals:    05/12/21 1043   BP: 122/66   Pulse: 90   Resp: 18   Temp: 98 7 °F (37 1 °C)   SpO2: 99%       Physical Exam  Constitutional:       Appearance: Normal appearance  HENT:      Head: Normocephalic and atraumatic  Right Ear: External ear normal       Left Ear: External ear normal       Nose: Nose normal       Mouth/Throat:      Mouth: Mucous membranes are dry  Eyes:      General: No scleral icterus  Neck:      Musculoskeletal: Normal range of motion and neck supple  Cardiovascular:      Rate and Rhythm: Normal rate and regular rhythm  Heart sounds: Normal heart sounds     Pulmonary: Effort: Pulmonary effort is normal       Breath sounds: Normal breath sounds  Abdominal:      General: Abdomen is flat  Bowel sounds are normal  There is no distension  Palpations: Abdomen is soft  There is no mass  Tenderness: There is no abdominal tenderness  There is no guarding or rebound  Hernia: No hernia is present  Musculoskeletal: Normal range of motion  Skin:     General: Skin is warm and dry  Neurological:      General: No focal deficit present  Mental Status: She is alert and oriented to person, place, and time  Psychiatric:         Mood and Affect: Mood normal          Behavior: Behavior normal          Thought Content: Thought content normal          Judgment: Judgment normal            Results:  Labs:  none    Imaging  Ct Chest Abdomen Pelvis W Contrast    Result Date: 5/3/2021  Narrative: CT CHEST, ABDOMEN AND PELVIS WITH IV CONTRAST INDICATION:   C49  A2: Gastrointestinal stromal tumor of stomach  Excerpt from Hematology and Oncology progress note dated 12/30/2020:  "Summary, this is an 80-year-old female history of recently diagnosed gastric GIST tumor "  "We reviewed that surgical resection followed by consideration for medical therapy is the generally recommended path  She is not inclined to have surgical resection  Given that medical therapy is generally effective, this seems like a reasonable option  Reconsideration for surgery or continuation of medical therapy could be carried out after restaging "  "Reimaging 2-3 months after treatment initiation is recommended  " COMPARISON:  CT abdomen pelvis 12/2/2020 12/1/2020 and 8/11/2009  TECHNIQUE: CT examination of the chest, abdomen and pelvis was performed  Axial, sagittal, and coronal 2D reformatted images were created from the source data and submitted for interpretation  Radiation dose length product (DLP) for this visit:  739 mGy-cm     This examination, like all CT scans performed in the [facility], was performed utilizing techniques to minimize radiation dose exposure, including the use of iterative reconstruction and automated exposure control  IV Contrast:  85 mL of iohexol (OMNIPAQUE) Enteric Contrast: Enteric contrast was administered  FINDINGS: CHEST STOMACH AND BOWEL:  Reidentified partially exophytic gastric mass measuring 6 5 x 6 4 x 10 8 cm decreased in size from prior dimensions of 9 3 x 8 6 x 14 0 cm  #6/14 and #601/61  LUNGS:  Small left pleural effusion new since the prior study  The above-described mass abuts the medial aspect of the left hemidiaphragm #601/61  Mild biapical pleural/parenchymal scarring  PLEURA:  Small left pleural effusion new since the prior study  HEART/GREAT VESSELS:  Posterior pericardial effusion measuring approximately 11 mm in maximum thickness  #6/1  MEDIASTINUM AND DEVAN:  Unremarkable  CHEST WALL AND LOWER NECK:   Enlarged thyroid with multiple subcentimeter hypodensities; these do not meet size criteria necessitating follow-up    ABDOMEN LIVER/BILIARY TREE:  New 6 mm peripheral left hepatic hypodensity #6/10 and #601/43  11 mm segment 8 hypodensity #6/14 was not well visualized on prior studies but likely measured approximately 7 mm on the 12/2/2020 study  3 mm segment 8 hypodensity #6/74 is unchanged since 2009  5 mm segment 4A hepatic hypodensity #6/9  GALLBLADDER:  Decompressed with gallstones  No pericholecystic inflammatory changes  SPLEEN:  Unremarkable  PANCREAS:  Unremarkable  ADRENAL GLANDS:  Unremarkable  KIDNEYS/URETERS:  Unremarkable  No hydronephrosis  APPENDIX:  No findings to suggest appendicitis  ABDOMINOPELVIC CAVITY:  No ascites  No pneumoperitoneum  No lymphadenopathy  VESSELS:  Unremarkable for patient's age  PELVIS REPRODUCTIVE ORGANS:  Unremarkable for patient's age  URINARY BLADDER:  Unremarkable  ABDOMINAL WALL/INGUINAL REGIONS:  Unremarkable  OSSEOUS STRUCTURES:  No acute fracture or destructive osseous lesion  Impression: Reidentified partially exophytic gastric mass measuring 6 5 x 6 4 x 10 8 cm decreased in size from prior dimensions of 9 3 x 8 6 x 14 0 cm  #6/14 and #601/61  New 6 and 5 mm left hepatic hypodensities  Enlarged 11 mm right hepatic hypodensity  Small pleural effusion and small posterior pericardial effusion  The study was marked in EPIC for significant notification  Workstation performed: [id number]     I reviewed the above laboratory and imaging data  Discussion/Summary:  Large gastrointestinal stromal tumor, which is responding to [address]  New small hepatic lesions of undetermined etiology  Will continue [address] for now and reassess the next 3-4 months  We discussed possibly surgery down the road, though given that she continues to have favorable response to therapy, would try to minimize potential extent of surgery/ resection with continued chemotherapy  All questions answered and copy report given to patient for records

## 2021-05-13 DIAGNOSIS — K31.89 MASS OF STOMACH: ICD-10-CM

## 2021-05-13 DIAGNOSIS — C49.A2 MALIGNANT GASTROINTESTINAL STROMAL TUMOR (GIST) OF STOMACH (HCC): ICD-10-CM

## 2021-05-14 RX ORDER — IMATINIB MESYLATE 400 MG/1
400 TABLET, FILM COATED ORAL DAILY
Qty: 30 TABLET | Refills: 10 | Status: SHIPPED | OUTPATIENT
Start: 2021-05-14 | End: 2022-05-25

## 2021-05-24 DIAGNOSIS — C49.A2 MALIGNANT GASTROINTESTINAL STROMAL TUMOR (GIST) OF STOMACH (HCC): ICD-10-CM

## 2021-05-24 RX ORDER — PANTOPRAZOLE SODIUM 20 MG/1
20 TABLET, DELAYED RELEASE ORAL DAILY
Qty: 30 TABLET | Refills: 1 | Status: SHIPPED | OUTPATIENT
Start: 2021-05-24 | End: 2021-07-20

## 2021-06-19 DIAGNOSIS — K31.89 MASS OF STOMACH: ICD-10-CM

## 2021-06-19 RX ORDER — ONDANSETRON 4 MG/1
TABLET, ORALLY DISINTEGRATING ORAL
Qty: 60 TABLET | Refills: 1 | Status: SHIPPED | OUTPATIENT
Start: 2021-06-19

## 2021-07-02 ENCOUNTER — TELEPHONE (OUTPATIENT)
Dept: HEMATOLOGY ONCOLOGY | Facility: CLINIC | Age: 81
End: 2021-07-02

## 2021-07-02 NOTE — TELEPHONE ENCOUNTER
I phoned the patient and left a voicemail message reminding the patient to have her labs completed prior to her appointment with Iris Reyes on 7/8  The [employer] number was provided

## 2021-07-03 ENCOUNTER — APPOINTMENT (OUTPATIENT)
Dept: LAB | Facility: CLINIC | Age: 81
End: 2021-07-03
Payer: MEDICARE

## 2021-07-03 DIAGNOSIS — D50.0 ANEMIA DUE TO GASTROINTESTINAL BLOOD LOSS: ICD-10-CM

## 2021-07-03 DIAGNOSIS — D50.0 IRON DEFICIENCY ANEMIA DUE TO CHRONIC BLOOD LOSS: ICD-10-CM

## 2021-07-03 DIAGNOSIS — C49.A2 MALIGNANT GASTROINTESTINAL STROMAL TUMOR (GIST) OF STOMACH (HCC): ICD-10-CM

## 2021-07-03 LAB
ALBUMIN SERPL BCP-MCNC: 3.5 G/DL (ref 3.5–5)
ALP SERPL-CCNC: 84 U/L (ref 46–116)
ALT SERPL W P-5'-P-CCNC: 17 U/L (ref 12–78)
ANION GAP SERPL CALCULATED.3IONS-SCNC: 5 MMOL/L (ref 4–13)
AST SERPL W P-5'-P-CCNC: 19 U/L (ref 5–45)
BASOPHILS # BLD AUTO: 0.06 THOUSANDS/ΜL (ref 0–0.1)
BASOPHILS NFR BLD AUTO: 1 % (ref 0–1)
BILIRUB SERPL-MCNC: 0.57 MG/DL (ref 0.2–1)
BUN SERPL-MCNC: 14 MG/DL (ref 5–25)
CALCIUM SERPL-MCNC: 9.1 MG/DL (ref 8.3–10.1)
CHLORIDE SERPL-SCNC: 109 MMOL/L (ref 100–108)
CO2 SERPL-SCNC: 28 MMOL/L (ref 21–32)
CREAT SERPL-MCNC: 0.57 MG/DL (ref 0.6–1.3)
EOSINOPHIL # BLD AUTO: 0.25 THOUSAND/ΜL (ref 0–0.61)
EOSINOPHIL NFR BLD AUTO: 5 % (ref 0–6)
ERYTHROCYTE [DISTWIDTH] IN BLOOD BY AUTOMATED COUNT: 15.3 % (ref 11.6–15.1)
FERRITIN SERPL-MCNC: 21 NG/ML (ref 8–388)
GFR SERPL CREATININE-BSD FRML MDRD: 88 ML/MIN/1.73SQ M
GLUCOSE P FAST SERPL-MCNC: 87 MG/DL (ref 65–99)
HCT VFR BLD AUTO: 36.3 % (ref 34.8–46.1)
HGB BLD-MCNC: 11.9 G/DL (ref 11.5–15.4)
IMM GRANULOCYTES # BLD AUTO: 0.01 THOUSAND/UL (ref 0–0.2)
IMM GRANULOCYTES NFR BLD AUTO: 0 % (ref 0–2)
IRON SATN MFR SERPL: 17 %
IRON SERPL-MCNC: 63 UG/DL (ref 50–170)
LYMPHOCYTES # BLD AUTO: 0.6 THOUSANDS/ΜL (ref 0.6–4.47)
LYMPHOCYTES NFR BLD AUTO: 13 % (ref 14–44)
MCH RBC QN AUTO: 31.8 PG (ref 26.8–34.3)
MCHC RBC AUTO-ENTMCNC: 32.8 G/DL (ref 31.4–37.4)
MCV RBC AUTO: 97 FL (ref 82–98)
MONOCYTES # BLD AUTO: 0.52 THOUSAND/ΜL (ref 0.17–1.22)
MONOCYTES NFR BLD AUTO: 11 % (ref 4–12)
NEUTROPHILS # BLD AUTO: 3.22 THOUSANDS/ΜL (ref 1.85–7.62)
NEUTS SEG NFR BLD AUTO: 70 % (ref 43–75)
NRBC BLD AUTO-RTO: 0 /100 WBCS
PLATELET # BLD AUTO: 154 THOUSANDS/UL (ref 149–390)
PMV BLD AUTO: 9.3 FL (ref 8.9–12.7)
POTASSIUM SERPL-SCNC: 3.8 MMOL/L (ref 3.5–5.3)
PROT SERPL-MCNC: 6.1 G/DL (ref 6.4–8.2)
RBC # BLD AUTO: 3.74 MILLION/UL (ref 3.81–5.12)
SODIUM SERPL-SCNC: 142 MMOL/L (ref 136–145)
TIBC SERPL-MCNC: 365 UG/DL (ref 250–450)
WBC # BLD AUTO: 4.66 THOUSAND/UL (ref 4.31–10.16)

## 2021-07-03 PROCEDURE — 82728 ASSAY OF FERRITIN: CPT

## 2021-07-03 PROCEDURE — 36415 COLL VENOUS BLD VENIPUNCTURE: CPT | Performed by: NURSE PRACTITIONER

## 2021-07-03 PROCEDURE — 80053 COMPREHEN METABOLIC PANEL: CPT | Performed by: NURSE PRACTITIONER

## 2021-07-03 PROCEDURE — 83550 IRON BINDING TEST: CPT

## 2021-07-03 PROCEDURE — 85025 COMPLETE CBC W/AUTO DIFF WBC: CPT

## 2021-07-03 PROCEDURE — 83540 ASSAY OF IRON: CPT

## 2021-07-08 ENCOUNTER — OFFICE VISIT (OUTPATIENT)
Dept: HEMATOLOGY ONCOLOGY | Facility: CLINIC | Age: 81
End: 2021-07-08
Payer: MEDICARE

## 2021-07-08 VITALS
WEIGHT: 97.5 LBS | HEART RATE: 94 BPM | DIASTOLIC BLOOD PRESSURE: 58 MMHG | OXYGEN SATURATION: 95 % | RESPIRATION RATE: 16 BRPM | HEIGHT: 60 IN | TEMPERATURE: 97.2 F | BODY MASS INDEX: 19.14 KG/M2 | SYSTOLIC BLOOD PRESSURE: 122 MMHG

## 2021-07-08 DIAGNOSIS — D69.6 DECREASED PLATELET COUNT (HCC): ICD-10-CM

## 2021-07-08 DIAGNOSIS — C49.A2 MALIGNANT GASTROINTESTINAL STROMAL TUMOR (GIST) OF STOMACH (HCC): Primary | ICD-10-CM

## 2021-07-08 DIAGNOSIS — D50.0 IRON DEFICIENCY ANEMIA DUE TO CHRONIC BLOOD LOSS: ICD-10-CM

## 2021-07-08 PROCEDURE — 99214 OFFICE O/P EST MOD 30 MIN: CPT | Performed by: NURSE PRACTITIONER

## 2021-07-08 NOTE — PROGRESS NOTES
[address] [facility]  [address] [facility]  [address] [facility] [address] [phone]  Progress Note  Faizan Shea, 1940, [id number]  7/8/2021    Assessment/Plan:  1  Malignant gastrointestinal stromal tumor (GIST) of stomach ([facility] [address] )  2  Iron deficiency anemia due to chronic blood loss  3  Decreased platelet count ([facility] )   Patient is an 51-year-old female with a history of GIST currently on Gleevec 400 mg p o  daily she is tolerating this without difficulty although does report mild nausea  She manages this with Zofran  We reviewed her most recent blood work, CBC reveals a red blood cell count 3 74 however hemoglobin is normal at 11 9 and MCV 97 with an otherwise stable CBC and differential   Metabolic panel was also essentially stable, total protein improved to 6 1 from 5 8 in April  Patient's daughter states she is giving her protein shakes in the evening to maintain her weight  Iron panel reveals a saturation of 17% with a ferritin level of 21  Patient continues on oral iron and is tolerating this without difficulty  I instructed her to continue with the oral iron  Patient has a follow-up appointment in September with surgical oncology after her CT scan  We will plan to see her after both are completed  Patient and her daughter verbalized understanding and are in agreement with the plan  - CBC and differential; Future  - Comprehensive metabolic panel; Future  - Iron Panel (Includes Ferritin, Iron Sat%, Iron, and TIBC); Future    Goals and Barriers:    Current Goal:   Prolong Survival from Cancer  Barriers: None        Patient's Capacity to Self Care:  Patient is able to self care   -------------------------------------------------------------------------------------------------------    Chief Complaint   Patient presents with    Follow-up       History of present illness/Cancer History:   Oncology History   GIST (gastrointestinal stromal tumor), malignant ([facility] [address] )   2020 Biopsy    Gastric mass FNA:  - Positive for neoplasm consistent with a gastrointestinal stromal tumor (GIST)     2020 -  Chemotherapy    Gleevec 400mg PO daily          Cancer Staging  No matching staging information was found for the patient  ECO - Symptomatic but completely ambulatory    Interval history:  clinically stable     Review of Systems   Constitutional: Negative for activity change, appetite change, fatigue, fever and unexpected weight change  Respiratory: Negative for cough and shortness of breath  Cardiovascular: Negative for chest pain and leg swelling  Gastrointestinal: Positive for nausea  Negative for abdominal pain, constipation and diarrhea  Endocrine: Negative for cold intolerance and heat intolerance  Musculoskeletal: Negative for arthralgias and myalgias  Skin: Negative  Neurological: Negative for dizziness, weakness and headaches  Hematological: Negative for adenopathy  Does not bruise/bleed easily  Current Outpatient Medications:     ferrous sulfate 324 (65 Fe) mg, Take 1 tablet (324 mg total) by mouth daily before breakfast, Disp: 30 tablet, Rfl: 6    imatinib (GLEEVEC) 400 mg tablet, Take 1 tablet (400 mg total) by mouth daily  , Disp: 30 tablet, Rfl: 10    loratadine (CLARITIN) 10 mg tablet, Take by mouth, Disp: , Rfl:     ondansetron (ZOFRAN-ODT) 4 mg disintegrating tablet, TAKE 1 TABLET (4 MG TOTAL) BY MOUTH EVERY 6 (SIX) HOURS AS NEEDED FOR NAUSEA OR VOMITING, Disp: 60 tablet, Rfl: 1    pantoprazole (PROTONIX) 20 mg tablet, TAKE 1 TABLET (20 MG TOTAL) BY MOUTH DAILY TO PREVENT HEARTBURN, Disp: 30 tablet, Rfl: 1    traMADol (ULTRAM) 50 mg tablet, Take 1 tablet (50 mg total) by mouth every 6 (six) hours as needed (cancer pain    Max 4 tabs a day ), Disp: 120 tablet, Rfl: 0    hydrochlorothiazide (HYDRODIURIL) 25 mg tablet, TAKE 0 5 TABLETS (12 5 MG TOTAL) BY MOUTH DAILY AS NEEDED (FOOT SWELLING OR PUFFINESS) (Patient not taking: Reported on 7/8/2021), Disp: 20 tablet, Rfl: 0    Allergies   Allergen Reactions    Bactrim [Sulfamethoxazole-Trimethoprim]     Simvastatin Myalgia       Advance Directive and Living Will:        Objective:   /58 (BP Location: Left arm, Patient Position: Sitting, Cuff Size: Standard)   Pulse 94   Temp (!) 97 2 °F (36 2 °C) (Tympanic)   Resp 16   Ht 5' (1 524 m)   Wt 44 2 kg (97 lb 8 oz)   SpO2 95%   BMI 19 04 kg/m²   Wt Readings from Last 6 Encounters:   07/08/21 44 2 kg (97 lb 8 oz)   05/12/21 45 8 kg (101 lb)   05/06/21 45 8 kg (101 lb)   05/03/21 45 kg (99 lb 3 3 oz)   03/11/21 43 5 kg (96 lb)   03/03/21 45 1 kg (99 lb 6 8 oz)       Physical Exam  Vitals reviewed  Constitutional:       Appearance: She is well-developed  HENT:      Head: Normocephalic and atraumatic  Eyes:      Extraocular Movements: Extraocular movements intact  Conjunctiva/sclera: Conjunctivae normal       Pupils: Pupils are equal, round, and reactive to light  Cardiovascular:      Rate and Rhythm: Normal rate and regular rhythm  Pulses: Normal pulses  Heart sounds: Normal heart sounds  No murmur heard  Pulmonary:      Effort: Pulmonary effort is normal  No respiratory distress  Breath sounds: Normal breath sounds  Abdominal:      General: Bowel sounds are normal       Palpations: Abdomen is soft  Musculoskeletal:         General: Normal range of motion  Cervical back: Normal range of motion and neck supple  Lymphadenopathy:      Cervical: No cervical adenopathy  Skin:     General: Skin is warm and dry  Capillary Refill: Capillary refill takes less than 2 seconds  Neurological:      Mental Status: She is alert and oriented to person, place, and time     Psychiatric:         Behavior: Behavior normal        Pertinent Laboratory Results and Imaging Review:  Appointment on 07/03/2021   Component Date Value Ref Range Status    WBC 07/03/2021 4 66  4 31 - 10 16 Thousand/uL Final    RBC 07/03/2021 3 74* 3 81 - 5 12 Million/uL Final    Hemoglobin 07/03/2021 11 9  11 5 - 15 4 g/dL Final    Hematocrit 07/03/2021 36 3  34 8 - 46 1 % Final    MCV 07/03/2021 97  82 - 98 fL Final    MCH 07/03/2021 31 8  26 8 - 34 3 pg Final    MCHC 07/03/2021 32 8  31 4 - 37 4 g/dL Final    RDW 07/03/2021 15 3* 11 6 - 15 1 % Final    MPV 07/03/2021 9 3  8 9 - 12 7 fL Final    Platelets 94/32/7418 154  149 - 390 Thousands/uL Final    nRBC 07/03/2021 0  /100 WBCs Final    Neutrophils Relative 07/03/2021 70  43 - 75 % Final    Immat GRANS % 07/03/2021 0  0 - 2 % Final    Lymphocytes Relative 07/03/2021 13* 14 - 44 % Final    Monocytes Relative 07/03/2021 11  4 - 12 % Final    Eosinophils Relative 07/03/2021 5  0 - 6 % Final    Basophils Relative 07/03/2021 1  0 - 1 % Final    Neutrophils Absolute 07/03/2021 3 22  1 85 - 7 62 Thousands/µL Final    Immature Grans Absolute 07/03/2021 0 01  0 00 - 0 20 Thousand/uL Final    Lymphocytes Absolute 07/03/2021 0 60  0 60 - 4 47 Thousands/µL Final    Monocytes Absolute 07/03/2021 0 52  0 17 - 1 22 Thousand/µL Final    Eosinophils Absolute 07/03/2021 0 25  0 00 - 0 61 Thousand/µL Final    Basophils Absolute 07/03/2021 0 06  0 00 - 0 10 Thousands/µL Final    Iron Saturation 07/03/2021 17  % Final    TIBC 07/03/2021 365  250 - 450 ug/dL Final    Iron 07/03/2021 63  50 - 170 ug/dL Final    Patients treated with metal-binding drugs (ie  Deferoxamine) may have depressed iron values   Ferritin 07/03/2021 21  8 - 388 ng/mL Final         The following historical data was reviewed      Past Medical History:   Diagnosis Date    High cholesterol        Past Surgical History:   Procedure Laterality Date    FL INJECTION LEFT HIP (NON ARTHROGRAM)  5/14/2019       Social History     Socioeconomic History    Marital status: /Civil Union     Spouse name: None    Number of children: None    Years of education: None    Highest education level: None   Occupational History    None   Tobacco Use    Smoking status: Never Smoker    Smokeless tobacco: Never Used   Vaping Use    Vaping Use: Never used   Substance and Sexual Activity    Alcohol use: Yes     Comment: occasional    Drug use: Never    Sexual activity: None   Other Topics Concern    None   Social History Narrative    None     Social Determinants of Health     Financial Resource Strain:     Difficulty of Paying Living Expenses:    Food Insecurity:     Worried About Running Out of Food in the Last Year:     Ran Out of Food in the Last Year:    Transportation Needs:     Lack of Transportation (Medical):  Lack of Transportation (Non-Medical):    Physical Activity:     Days of Exercise per Week:     Minutes of Exercise per Session:    Stress:     Feeling of Stress :    Social Connections:     Frequency of Communication with Friends and Family:     Frequency of Social Gatherings with Friends and Family:     Attends Denominational Services:     Active Member of Clubs or Organizations:     Attends Club or Organization Meetings:     Marital Status:    Intimate Partner Violence:     Fear of Current or Ex-Partner:     Emotionally Abused:     Physically Abused:     Sexually Abused:        No family history on file  Please note: This report has been generated by a voice recognition software system  Therefore there may be syntax, spelling, and/or grammatical errors  Please call if you have any questions

## 2021-07-17 ENCOUNTER — TELEPHONE (OUTPATIENT)
Dept: OTHER | Facility: OTHER | Age: 81
End: 2021-07-17

## 2021-07-17 ENCOUNTER — OFFICE VISIT (OUTPATIENT)
Dept: URGENT CARE | Age: 81
End: 2021-07-17
Payer: MEDICARE

## 2021-07-17 ENCOUNTER — NURSE TRIAGE (OUTPATIENT)
Dept: OTHER | Facility: OTHER | Age: 81
End: 2021-07-17

## 2021-07-17 VITALS — HEART RATE: 99 BPM | TEMPERATURE: 98.8 F | RESPIRATION RATE: 20 BRPM | OXYGEN SATURATION: 96 %

## 2021-07-17 DIAGNOSIS — B96.89 BACTERIAL UPPER RESPIRATORY INFECTION: Primary | ICD-10-CM

## 2021-07-17 DIAGNOSIS — J06.9 BACTERIAL UPPER RESPIRATORY INFECTION: Primary | ICD-10-CM

## 2021-07-17 PROCEDURE — G0463 HOSPITAL OUTPT CLINIC VISIT: HCPCS | Performed by: NURSE PRACTITIONER

## 2021-07-17 PROCEDURE — 99213 OFFICE O/P EST LOW 20 MIN: CPT | Performed by: NURSE PRACTITIONER

## 2021-07-17 RX ORDER — AMOXICILLIN AND CLAVULANATE POTASSIUM 500; 125 MG/1; MG/1
1 TABLET, FILM COATED ORAL 2 TIMES DAILY
Qty: 14 TABLET | Refills: 0 | Status: SHIPPED | OUTPATIENT
Start: 2021-07-17 | End: 2021-07-24

## 2021-07-17 NOTE — PATIENT INSTRUCTIONS
Upper Respiratory Infection   WHAT YOU NEED TO KNOW:   An upper respiratory infection is also called a cold  It can affect your nose, throat, ears, and sinuses  Cold symptoms are usually worst for the first 3 to 5 days  Most people get better in 7 to 14 days  You may continue to cough for 2 to 3 weeks  Colds are caused by viruses and do not get better with antibiotics  DISCHARGE INSTRUCTIONS:   Call your local emergency number (911 in the [address] [facility]) if:   · You have chest pain or trouble breathing  Return to the emergency department if:   · You have a fever over 102ºF (39ºC)  Call your doctor if:   · You have a low fever  · Your sore throat gets worse or you see white or yellow spots in your throat  · Your symptoms get worse after 3 to 5 days or are not better in 14 days  · You have a rash anywhere on your skin  · You have large, tender lumps in your neck  · You have thick, green, or yellow drainage from your nose  · You cough up thick yellow, green, or bloody mucus  · You have a bad earache  · You have questions or concerns about your condition or care  Medicines: You may need any of the following:  · Decongestants  help reduce nasal congestion and help you breathe more easily  If you take decongestant pills, they may make you feel restless or cause problems with your sleep  Do not use decongestant sprays for more than a few days  · Cough suppressants  help reduce coughing  Ask your healthcare provider which type of cough medicine is best for you  · NSAIDs , such as ibuprofen, help decrease swelling, pain, and fever  NSAIDs can cause stomach bleeding or kidney problems in certain people  If you take blood thinner medicine, always ask your healthcare provider if NSAIDs are safe for you  Always read the medicine label and follow directions  · Acetaminophen  decreases pain and fever  It is available without a doctor's order  Ask how much to take and how often to take it  Follow directions  Read the labels of all other medicines you are using to see if they also contain acetaminophen, or ask your doctor or pharmacist  Acetaminophen can cause liver damage if not taken correctly  Do not use more than 4 grams (4,000 milligrams) total of acetaminophen in one day  · Take your medicine as directed  Contact your healthcare provider if you think your medicine is not helping or if you have side effects  Tell him or her if you are allergic to any medicine  Keep a list of the medicines, vitamins, and herbs you take  Include the amounts, and when and why you take them  Bring the list or the pill bottles to follow-up visits  Carry your medicine list with you in case of an emergency  Self-care:   · Rest as much as possible  Slowly start to do more each day  · Drink more liquids as directed  Liquids will help thin and loosen mucus so you can cough it up  Liquids will also help prevent dehydration  Liquids that help prevent dehydration include water, fruit juice, and broth  Do not drink liquids that contain caffeine  Caffeine can increase your risk for dehydration  Ask your healthcare provider how much liquid to drink each day  · Soothe a sore throat  Gargle with warm salt water  Make salt water by dissolving ¼ teaspoon salt in 1 cup warm water  You may also suck on hard candy or throat lozenges  You may use a sore throat spray  · Use a humidifier or vaporizer  Use a cool mist humidifier or a vaporizer to increase air moisture in your home  This may make it easier for you to breathe and help decrease your cough  · Use saline nasal drops as directed  These help relieve congestion  · Apply petroleum-based jelly around the outside of your nostrils  This can decrease irritation from blowing your nose  · Do not smoke  Nicotine and other chemicals in cigarettes and cigars can make your symptoms worse  They can also cause infections such as bronchitis or pneumonia   Ask your healthcare provider for information if you currently smoke and need help to quit  E-cigarettes or smokeless tobacco still contain nicotine  Talk to your healthcare provider before you use these products  Prevent a cold:   · Wash your hands often  Use soap and water every time you wash your hands  Rub your soapy hands together, lacing your fingers  Use the fingers of one hand to scrub under the nails of the other hand  Wash for at least 20 seconds  Rinse with warm, running water for several seconds  Then dry your hands  Use germ-killing gel if soap and water are not available  Do not touch your eyes or mouth without washing your hands first          · Cover a sneeze or cough  Use a tissue that covers your mouth and nose  Put the used tissue in the trash right away  Use the bend of your arm if a tissue is not available  Wash your hands well with soap and water or use a hand   Do not stand close to anyone who is sneezing or coughing  · Try to stay away from others while you are sick  This is especially important during the first 2 to 3 days when the virus is more easily spread  Wait until a fever, cough, or other symptoms are gone before you return to work or other regular activities  · Do not share items while you are sick  This includes food, drinks, eating utensils, and dishes  Follow up with your doctor as directed:  Write down your questions so you remember to ask them during your visits  © Copyright [address] Information is for End User's use only and may not be sold, redistributed or otherwise used for commercial purposes  All illustrations and images included in CareNotes® are the copyrighted property of A D A M , Inc  or [facility] Raeann Covarrubias   The above information is an  only  It is not intended as medical advice for individual conditions or treatments   Talk to your doctor, nurse or pharmacist before following any medical regimen to see if it is safe and effective for you

## 2021-07-17 NOTE — TELEPHONE ENCOUNTER
Reason for Disposition   [1] Sinus congestion as part of a cold AND [2] present < 10 days    Answer Assessment - Initial Assessment Questions  1  LOCATION: "Where does it hurt?"       Nose and chest congestion   2  ONSET: "When did the sinus pain start?"  (e g , hours, days)       July 12th  3  SEVERITY: "How bad is the pain?"   (Scale 1-10; mild, moderate or severe)    - MILD (1-3): doesn't interfere with normal activities     - MODERATE (4-7): interferes with normal activities (e g , work or school) or awakens from sleep    - SEVERE (8-10): excruciating pain and patient unable to do any normal activities         0  4  RECURRENT SYMPTOM: "Have you ever had sinus problems before?" If Yes, ask: "When was the last time?" and "What happened that time?"       yes  5  NASAL CONGESTION: "Is the nose blocked?" If Yes, ask: "Can you open it or must you breathe through the mouth?"      Yes   6  NASAL DISCHARGE: "Do you have discharge from your nose?" If so ask, "What color?"      Yes yellow and blood tinged   7  FEVER: "Do you have a fever?" If Yes, ask: "What is it, how was it measured, and when did it start?"      no  8  OTHER SYMPTOMS: "Do you have any other symptoms?" (e g , sore throat, cough, earache, difficulty breathing)      Coughing, breathing ok just mouth breathing   9   PREGNANCY: "Is there any chance you are pregnant?" "When was your last menstrual period?"      no    Protocols used: SINUS PAIN OR CONGESTION-ADULT-

## 2021-07-17 NOTE — PROGRESS NOTES
[address] [facility]        NAME: Sebastián Castro is a [de-identified] y o  female  : 1940    MRN: [id number]  DATE: 2021  TIME: 11:54 AM    Assessment and Plan   Bacterial upper respiratory infection [J06 9, B96 89]  1  Bacterial upper respiratory infection  amoxicillin-clavulanate (AUGMENTIN) 500-125 mg per tablet         Patient Instructions      take medications as directed  Follow up with PCP in 3-5 days  Proceed to  ER if symptoms worsen  Chief Complaint     Chief Complaint   Patient presents with    Headache     pt states started with symptoms for about 1 week, no fever, no known covid exposures, pt is vaccinated    Nasal Congestion         History of Present Illness       HPI    reports headache and pressure in the face x1 week  Also nasal congestion and chest congestion x1 week  No fever  States she has received her COVID vaccines  Review of Systems   Review of Systems   Constitutional: Negative for chills and fever  HENT: Positive for congestion, rhinorrhea, sinus pressure and sinus pain  Negative for sore throat and trouble swallowing  Respiratory: Negative for cough and shortness of breath  Cardiovascular: Negative for chest pain  Gastrointestinal: Negative for diarrhea and vomiting  Neurological: Negative for headaches  Current Medications       Current Outpatient Medications:     ferrous sulfate 324 (65 Fe) mg, Take 1 tablet (324 mg total) by mouth daily before breakfast, Disp: 30 tablet, Rfl: 6    imatinib (GLEEVEC) 400 mg tablet, Take 1 tablet (400 mg total) by mouth daily  , Disp: 30 tablet, Rfl: 10    loratadine (CLARITIN) 10 mg tablet, Take by mouth, Disp: , Rfl:     pantoprazole (PROTONIX) 20 mg tablet, TAKE 1 TABLET (20 MG TOTAL) BY MOUTH DAILY TO PREVENT HEARTBURN, Disp: 30 tablet, Rfl: 1    traMADol (ULTRAM) 50 mg tablet, Take 1 tablet (50 mg total) by mouth every 6 (six) hours as needed (cancer pain    Max 4 tabs a day ), Disp: 120 tablet, Rfl: 0   amoxicillin-clavulanate (AUGMENTIN) 500-125 mg per tablet, Take 1 tablet by mouth 2 (two) times a day for 7 days, Disp: 14 tablet, Rfl: 0    hydrochlorothiazide (HYDRODIURIL) 25 mg tablet, TAKE 0 5 TABLETS (12 5 MG TOTAL) BY MOUTH DAILY AS NEEDED (FOOT SWELLING OR PUFFINESS) (Patient not taking: Reported on 7/17/2021), Disp: 20 tablet, Rfl: 0    ondansetron (ZOFRAN-ODT) 4 mg disintegrating tablet, TAKE 1 TABLET (4 MG TOTAL) BY MOUTH EVERY 6 (SIX) HOURS AS NEEDED FOR NAUSEA OR VOMITING, Disp: 60 tablet, Rfl: 1    Current Allergies     Allergies as of 07/17/2021 - Reviewed 07/17/2021   Allergen Reaction Noted    Bactrim [sulfamethoxazole-trimethoprim]  08/01/2017    Simvastatin Myalgia 01/29/2013            The following portions of the patient's history were reviewed and updated as appropriate: allergies, current medications, past family history, past medical history, past social history, past surgical history and problem list      Past Medical History:   Diagnosis Date    High cholesterol        Past Surgical History:   Procedure Laterality Date    FL INJECTION LEFT HIP (NON ARTHROGRAM)  5/14/2019       History reviewed  No pertinent family history  Medications have been verified  Objective   Pulse 99   Temp 98 8 °F (37 1 °C)   Resp 20   SpO2 96%   No LMP recorded  Patient is postmenopausal        Physical Exam     Physical Exam  Constitutional:       Appearance: She is not ill-appearing or diaphoretic  HENT:      Right Ear: Tympanic membrane and ear canal normal       Left Ear: Tympanic membrane and ear canal normal       Nose: Rhinorrhea (tubinates 2+) present  Mouth/Throat:      Comments: Post nasal drip  Cardiovascular:      Rate and Rhythm: Regular rhythm  Heart sounds: Normal heart sounds  Pulmonary:      Effort: Pulmonary effort is normal       Breath sounds: Normal breath sounds

## 2021-07-17 NOTE — TELEPHONE ENCOUNTER
Regarding: Chest Congestion, Sinus Congestion, Cough  ----- Message from Pamela Gutierrez sent at 7/17/2021  8:48 AM EDT -----  " I have Chest Congestion, Sinus Congestion,Cough "

## 2021-07-20 DIAGNOSIS — C49.A2 MALIGNANT GASTROINTESTINAL STROMAL TUMOR (GIST) OF STOMACH (HCC): ICD-10-CM

## 2021-07-20 RX ORDER — PANTOPRAZOLE SODIUM 20 MG/1
20 TABLET, DELAYED RELEASE ORAL DAILY
Qty: 30 TABLET | Refills: 1 | Status: SHIPPED | OUTPATIENT
Start: 2021-07-20 | End: 2021-09-22

## 2021-09-10 ENCOUNTER — APPOINTMENT (OUTPATIENT)
Dept: LAB | Facility: CLINIC | Age: 81
End: 2021-09-10
Payer: MEDICARE

## 2021-09-10 DIAGNOSIS — D50.0 IRON DEFICIENCY ANEMIA DUE TO CHRONIC BLOOD LOSS: ICD-10-CM

## 2021-09-10 DIAGNOSIS — C49.A2 MALIGNANT GASTROINTESTINAL STROMAL TUMOR (GIST) OF STOMACH (HCC): ICD-10-CM

## 2021-09-10 LAB
ALBUMIN SERPL BCP-MCNC: 3 G/DL (ref 3.5–5)
ALP SERPL-CCNC: 101 U/L (ref 46–116)
ALT SERPL W P-5'-P-CCNC: 17 U/L (ref 12–78)
ANION GAP SERPL CALCULATED.3IONS-SCNC: 3 MMOL/L (ref 4–13)
ARTIFACT: PRESENT
AST SERPL W P-5'-P-CCNC: 24 U/L (ref 5–45)
BASOPHILS # BLD MANUAL: 0 THOUSAND/UL (ref 0–0.1)
BASOPHILS NFR MAR MANUAL: 0 % (ref 0–1)
BILIRUB SERPL-MCNC: 0.85 MG/DL (ref 0.2–1)
BUN SERPL-MCNC: 12 MG/DL (ref 5–25)
CALCIUM ALBUM COR SERPL-MCNC: 9.7 MG/DL (ref 8.3–10.1)
CALCIUM SERPL-MCNC: 8.9 MG/DL (ref 8.3–10.1)
CHLORIDE SERPL-SCNC: 103 MMOL/L (ref 100–108)
CO2 SERPL-SCNC: 27 MMOL/L (ref 21–32)
CREAT SERPL-MCNC: 0.6 MG/DL (ref 0.6–1.3)
EOSINOPHIL # BLD MANUAL: 0.23 THOUSAND/UL (ref 0–0.4)
EOSINOPHIL NFR BLD MANUAL: 4 % (ref 0–6)
ERYTHROCYTE [DISTWIDTH] IN BLOOD BY AUTOMATED COUNT: 14.2 % (ref 11.6–15.1)
FERRITIN SERPL-MCNC: 75 NG/ML (ref 8–388)
GFR SERPL CREATININE-BSD FRML MDRD: 86 ML/MIN/1.73SQ M
GLUCOSE SERPL-MCNC: 93 MG/DL (ref 65–140)
HCT VFR BLD AUTO: 35.9 % (ref 34.8–46.1)
HGB BLD-MCNC: 11.7 G/DL (ref 11.5–15.4)
IRON SATN MFR SERPL: 26 % (ref 15–50)
IRON SERPL-MCNC: 79 UG/DL (ref 50–170)
LYMPHOCYTES # BLD AUTO: 0.29 THOUSAND/UL (ref 0.6–4.47)
LYMPHOCYTES # BLD AUTO: 5 % (ref 14–44)
MCH RBC QN AUTO: 32.3 PG (ref 26.8–34.3)
MCHC RBC AUTO-ENTMCNC: 32.6 G/DL (ref 31.4–37.4)
MCV RBC AUTO: 99 FL (ref 82–98)
MONOCYTES # BLD AUTO: 0.47 THOUSAND/UL (ref 0–1.22)
MONOCYTES NFR BLD: 8 % (ref 4–12)
NEUTROPHILS # BLD MANUAL: 4.83 THOUSAND/UL (ref 1.85–7.62)
NEUTS BAND NFR BLD MANUAL: 1 % (ref 0–8)
NEUTS SEG NFR BLD AUTO: 82 % (ref 43–75)
PLATELET # BLD AUTO: 150 THOUSANDS/UL (ref 149–390)
PLATELET BLD QL SMEAR: ADEQUATE
PMV BLD AUTO: 10.5 FL (ref 8.9–12.7)
POTASSIUM SERPL-SCNC: 4.1 MMOL/L (ref 3.5–5.3)
PROT SERPL-MCNC: 6.2 G/DL (ref 6.4–8.2)
RBC # BLD AUTO: 3.62 MILLION/UL (ref 3.81–5.12)
SODIUM SERPL-SCNC: 133 MMOL/L (ref 136–145)
TIBC SERPL-MCNC: 299 UG/DL (ref 250–450)
WBC # BLD AUTO: 5.82 THOUSAND/UL (ref 4.31–10.16)

## 2021-09-10 PROCEDURE — 82728 ASSAY OF FERRITIN: CPT

## 2021-09-10 PROCEDURE — 83540 ASSAY OF IRON: CPT

## 2021-09-10 PROCEDURE — 36415 COLL VENOUS BLD VENIPUNCTURE: CPT

## 2021-09-10 PROCEDURE — 85027 COMPLETE CBC AUTOMATED: CPT

## 2021-09-10 PROCEDURE — 83550 IRON BINDING TEST: CPT

## 2021-09-10 PROCEDURE — 80053 COMPREHEN METABOLIC PANEL: CPT

## 2021-09-10 PROCEDURE — 85007 BL SMEAR W/DIFF WBC COUNT: CPT

## 2021-09-17 ENCOUNTER — HOSPITAL ENCOUNTER (OUTPATIENT)
Dept: CT IMAGING | Facility: HOSPITAL | Age: 81
Discharge: HOME | End: 2021-09-17
Attending: SURGERY
Payer: MEDICARE

## 2021-09-17 DIAGNOSIS — C49.A2 MALIGNANT GASTROINTESTINAL STROMAL TUMOR (GIST) OF STOMACH (HCC): ICD-10-CM

## 2021-09-17 PROCEDURE — 71260 CT THORAX DX C+: CPT

## 2021-09-17 PROCEDURE — 74177 CT ABD & PELVIS W/CONTRAST: CPT

## 2021-09-17 RX ADMIN — IOHEXOL 100 ML: 350 INJECTION, SOLUTION INTRAVENOUS at 10:10

## 2021-09-22 ENCOUNTER — OFFICE VISIT (OUTPATIENT)
Dept: SURGICAL ONCOLOGY | Facility: CLINIC | Age: 81
End: 2021-09-22
Payer: MEDICARE

## 2021-09-22 ENCOUNTER — TELEPHONE (OUTPATIENT)
Dept: HEMATOLOGY ONCOLOGY | Facility: CLINIC | Age: 81
End: 2021-09-22

## 2021-09-22 VITALS
HEART RATE: 112 BPM | WEIGHT: 100 LBS | HEIGHT: 60 IN | OXYGEN SATURATION: 98 % | TEMPERATURE: 98 F | SYSTOLIC BLOOD PRESSURE: 112 MMHG | RESPIRATION RATE: 16 BRPM | BODY MASS INDEX: 19.63 KG/M2 | DIASTOLIC BLOOD PRESSURE: 58 MMHG

## 2021-09-22 DIAGNOSIS — C49.A2 MALIGNANT GASTROINTESTINAL STROMAL TUMOR (GIST) OF STOMACH (HCC): Primary | ICD-10-CM

## 2021-09-22 DIAGNOSIS — C49.A2 MALIGNANT GASTROINTESTINAL STROMAL TUMOR (GIST) OF STOMACH (HCC): ICD-10-CM

## 2021-09-22 PROCEDURE — 99213 OFFICE O/P EST LOW 20 MIN: CPT | Performed by: SURGERY

## 2021-09-22 RX ORDER — PANTOPRAZOLE SODIUM 20 MG/1
20 TABLET, DELAYED RELEASE ORAL DAILY
Qty: 30 TABLET | Refills: 1 | Status: SHIPPED | OUTPATIENT
Start: 2021-09-22 | End: 2021-11-17

## 2021-09-22 NOTE — PROGRESS NOTES
Surgical Oncology Follow Up       [address]  [facility]  [address] [place] [address]    Karissa Clark  1940  [id number]  [address]  [facility]  [address] [address]    Chief Complaint   Patient presents with   [facility]       Assessment/Plan:    No problem-specific Assessment & Plan notes found for this encounter  Diagnoses and all orders for this visit:    Malignant gastrointestinal stromal tumor (GIST) of stomach [facility])        Advance Care Planning/Advance Directives:  Discussed disease status, cancer treatment plans and/or cancer treatment goals with the patient  Oncology History   GIST (gastrointestinal stromal tumor), malignant ([facility] [address] )   12/4/2020 Biopsy    Gastric mass FNA:  - Positive for neoplasm consistent with a gastrointestinal stromal tumor (GIST)     12/30/2020 -  Chemotherapy    Gleevec 400mg PO daily         History of Present Illness:   Patient is an 80-year-old woman being treated for gastrointestinal stromal tumor  She is doing well and tolerating her Gleevec with minimal side effects  She has occasional nausea  She her appetite is fair and she has maintained her weight   -Interval History:  She had recent CT scan done in anticipation of today's visit  Review of Systems:  Review of Systems   Constitutional: Negative  HENT: Negative  Eyes: Negative  Respiratory: Negative  Cardiovascular: Negative  Gastrointestinal: Negative  Endocrine: Negative  Genitourinary: Negative  Musculoskeletal: Negative  Skin: Negative  Allergic/Immunologic: Negative  Neurological: Negative  Hematological: Negative  Psychiatric/Behavioral: Negative          Patient Active Problem List   Diagnosis    Sinusitis    Left groin pain    Numbness    Elevated cholesterol    Osteoporosis    Radiculopathy, lumbar region    Lumbar disc herniation    Lumbar spondylosis    Numbness and tingling of both feet    Melena    Iron deficiency anemia due to chronic blood loss    HTN (hypertension)    IV infiltrate, initial encounter    Hypokalemia    SIRS (systemic inflammatory response syndrome) (HCC)    [facility]   [facility] discharge follow-up    Mild cognitive impairment with memory loss    Protein-calorie malnutrition ([facility] [address] )    Decreased platelet count (HCC)     Past Medical History:   Diagnosis Date    High cholesterol      Past Surgical History:   Procedure Laterality Date    FL INJECTION LEFT HIP (NON ARTHROGRAM)  5/14/2019     No family history on file  Social History     Socioeconomic History    Marital status: /Civil Union     Spouse name: Not on file    Number of children: Not on file    Years of education: Not on file    Highest education level: Not on file   Occupational History    Not on file   Tobacco Use    Smoking status: Never Smoker    Smokeless tobacco: Never Used   Vaping Use    Vaping Use: Never used   Substance and Sexual Activity    Alcohol use: Yes     Comment: occasional    Drug use: Never    Sexual activity: Not on file   Other Topics Concern    Not on file   Social History Narrative    Not on file     Social Determinants of Health     Financial Resource Strain:     Difficulty of Paying Living Expenses:    Food Insecurity:     Worried About Running Out of Food in the Last Year:     [address] in the Last Year:    Transportation Needs:     Lack of Transportation (Medical):      Lack of Transportation (Non-Medical):    Physical Activity:     Days of Exercise per Week:     Minutes of Exercise per Session:    Stress:     Feeling of Stress :    Social Connections:     Frequency of Communication with Friends and Family:     Frequency of Social Gatherings with Friends and Family:     Attends Hindu Services:     Active Member of Clubs or Organizations:     Attends Club or Organization Meetings:     Marital Status:    Intimate Partner Violence:     Fear of Current or Ex-Partner:     Emotionally Abused:     Physically Abused:     Sexually Abused:        Current Outpatient Medications:     ferrous sulfate 324 (65 Fe) mg, Take 1 tablet (324 mg total) by mouth daily before breakfast, Disp: 30 tablet, Rfl: 6    hydrochlorothiazide (HYDRODIURIL) 25 mg tablet, TAKE 0 5 TABLETS (12 5 MG TOTAL) BY MOUTH DAILY AS NEEDED (FOOT SWELLING OR PUFFINESS), Disp: 20 tablet, Rfl: 0    imatinib (GLEEVEC) 400 mg tablet, Take 1 tablet (400 mg total) by mouth daily  , Disp: 30 tablet, Rfl: 10    loratadine (CLARITIN) 10 mg tablet, Take by mouth, Disp: , Rfl:     ondansetron (ZOFRAN-ODT) 4 mg disintegrating tablet, TAKE 1 TABLET (4 MG TOTAL) BY MOUTH EVERY 6 (SIX) HOURS AS NEEDED FOR NAUSEA OR VOMITING, Disp: 60 tablet, Rfl: 1    pantoprazole (PROTONIX) 20 mg tablet, TAKE 1 TABLET (20 MG TOTAL) BY MOUTH DAILY TO PREVENT HEARTBURN, Disp: 30 tablet, Rfl: 1    traMADol (ULTRAM) 50 mg tablet, Take 1 tablet (50 mg total) by mouth every 6 (six) hours as needed (cancer pain  Max 4 tabs a day ), Disp: 120 tablet, Rfl: 0  Allergies   Allergen Reactions    Bactrim [Sulfamethoxazole-Trimethoprim]     Simvastatin Myalgia     Vitals:    09/22/21 0938   BP: 112/58   Pulse: (!) 112   Resp: 16   Temp: 98 °F (36 7 °C)   SpO2: 98%       Physical Exam  Constitutional:       Appearance: Normal appearance  HENT:      Head: Normocephalic and atraumatic  Right Ear: External ear normal       Left Ear: External ear normal       Nose: Nose normal    Eyes:      Extraocular Movements: Extraocular movements intact  Pupils: Pupils are equal, round, and reactive to light  Cardiovascular:      Rate and Rhythm: Normal rate and regular rhythm  Pulses: Normal pulses  Heart sounds: Normal heart sounds     Pulmonary:      Effort: Pulmonary effort is normal    Abdominal: General: Abdomen is flat  Palpations: Abdomen is soft  Musculoskeletal:         General: Normal range of motion  Cervical back: Normal range of motion and neck supple  Skin:     General: Skin is warm and dry  Neurological:      General: No focal deficit present  Mental Status: She is alert and oriented to person, place, and time  Psychiatric:         Mood and Affect: Mood normal          Behavior: Behavior normal          Thought Content: Thought content normal          Judgment: Judgment normal            Results:  Labs:  none    Imaging  CT 9/17/21:   Scan still not read officially, but on my review, gastric tumor seems smaller compared to a scan from this past spring  I reviewed the above laboratory and imaging data  Discussion/Summary:  Gastrointestinal stromal tumor, with apparent good response to therapy  Follow-up in 4 months with repeat scan at that time  Will present at tumor board to discuss treatment course including potential surgery  All questions answered

## 2021-09-22 NOTE — LETTER
September 22, 2021     [address] [place] [address]    Patient: Luisa Leary   YOB: 1940   Date of Visit: 9/22/2021       Dear Dr Jolene Choi:    Thank you for referring Maci Vivas to me for evaluation  Below are my notes for this consultation  If you have questions, please do not hesitate to call me  I look forward to following your patient along with you  Sincerely,        Mar Doyle MD        CC: MD Mar Ayers MD  9/22/2021 10:06 AM  Sign when Signing Visit     Surgical Oncology Follow Up       [address] [place] [address]  [address] [facility] [address]    Luisa Leary  1940  [id number]  [address] [facility] [facility]  [facility]  [address] [facility] [address]    Chief Complaint   Patient presents with   [facility]       Assessment/Plan:    No problem-specific Assessment & Plan notes found for this encounter  Diagnoses and all orders for this visit:    Malignant gastrointestinal stromal tumor (GIST) of stomach [facility])        Advance Care Planning/Advance Directives:  Discussed disease status, cancer treatment plans and/or cancer treatment goals with the patient  Oncology History   GIST (gastrointestinal stromal tumor), malignant ([facility] [address] )   12/4/2020 Biopsy    Gastric mass FNA:  - Positive for neoplasm consistent with a gastrointestinal stromal tumor (GIST)     12/30/2020 -  Chemotherapy    Gleevec 400mg PO daily         History of Present Illness:   Patient is an 80-year-old woman being treated for gastrointestinal stromal tumor  She is doing well and tolerating her Gleevec with minimal side effects  She has occasional nausea  She her appetite is fair and she has maintained her weight   -Interval History:  She had recent CT scan done in anticipation of today's visit      Review of Systems:  Review of Systems Constitutional: Negative  HENT: Negative  Eyes: Negative  Respiratory: Negative  Cardiovascular: Negative  Gastrointestinal: Negative  Endocrine: Negative  Genitourinary: Negative  Musculoskeletal: Negative  Skin: Negative  Allergic/Immunologic: Negative  Neurological: Negative  Hematological: Negative  Psychiatric/Behavioral: Negative  Patient Active Problem List   Diagnosis    Sinusitis    Left groin pain    Numbness    Elevated cholesterol    Osteoporosis    Radiculopathy, lumbar region    Lumbar disc herniation    Lumbar spondylosis    Numbness and tingling of both feet    Melena    Iron deficiency anemia due to chronic blood loss    HTN (hypertension)    IV infiltrate, initial encounter    Hypokalemia    SIRS (systemic inflammatory response syndrome) ([facility])    GIST (gastrointestinal stromal tumor), malignant ([facility] [address] [facility]   [facility] discharge follow[facility]up    Mild cognitive impairment with memory loss    Protein-calorie malnutrition ([facility] [address] [facility]    Decreased platelet count ([facility])     Past Medical History:   Diagnosis Date    High cholesterol      Past Surgical History:   Procedure Laterality Date    FL INJECTION LEFT HIP (NON ARTHROGRAM)  5/14/2019     No family history on file    Social History     Socioeconomic History    Marital status: /Civil Union     Spouse name: Not on file    Number of children: Not on file    Years of education: Not on file    Highest education level: Not on file   Occupational History    Not on file   Tobacco Use    Smoking status: Never Smoker    Smokeless tobacco: Never Used   Vaping Use    Vaping Use: Never used   Substance and Sexual Activity    Alcohol use: Yes     Comment: occasional    Drug use: Never    Sexual activity: Not on file   Other Topics Concern    Not on file   Social History Narrative    Not on file     Social Determinants of Health     Financial Resource Strain:     Difficulty of Paying Living Expenses:    Food Insecurity:     Worried About Running Out of Food in the Last Year:     [address] in the Last Year:    Transportation Needs:     Lack of Transportation (Medical):  Lack of Transportation (Non-Medical):    Physical Activity:     Days of Exercise per Week:     Minutes of Exercise per Session:    Stress:     Feeling of Stress :    Social Connections:     Frequency of Communication with Friends and Family:     Frequency of Social Gatherings with Friends and Family:     Attends [employer] Services:     Active Member of Clubs or Organizations:     Attends Club or Organization Meetings:     Marital Status:    Intimate Partner Violence:     Fear of Current or Ex-Partner:     Emotionally Abused:     Physically Abused:     Sexually Abused:        Current Outpatient Medications:     ferrous sulfate 324 (65 Fe) mg, Take 1 tablet (324 mg total) by mouth daily before breakfast, Disp: 30 tablet, Rfl: 6    hydrochlorothiazide (HYDRODIURIL) 25 mg tablet, TAKE 0 5 TABLETS (12 5 MG TOTAL) BY MOUTH DAILY AS NEEDED (FOOT SWELLING OR PUFFINESS), Disp: 20 tablet, Rfl: 0    imatinib (GLEEVEC) 400 mg tablet, Take 1 tablet (400 mg total) by mouth daily  , Disp: 30 tablet, Rfl: 10    loratadine (CLARITIN) 10 mg tablet, Take by mouth, Disp: , Rfl:     ondansetron (ZOFRAN-ODT) 4 mg disintegrating tablet, TAKE 1 TABLET (4 MG TOTAL) BY MOUTH EVERY 6 (SIX) HOURS AS NEEDED FOR NAUSEA OR VOMITING, Disp: 60 tablet, Rfl: 1    pantoprazole (PROTONIX) 20 mg tablet, TAKE 1 TABLET (20 MG TOTAL) BY MOUTH DAILY TO PREVENT HEARTBURN, Disp: 30 tablet, Rfl: 1    traMADol (ULTRAM) 50 mg tablet, Take 1 tablet (50 mg total) by mouth every 6 (six) hours as needed (cancer pain    Max 4 tabs a day ), Disp: 120 tablet, Rfl: 0  Allergies   Allergen Reactions    Bactrim [Sulfamethoxazole-Trimethoprim]     Simvastatin Myalgia     Vitals:    09/22/21 0938   BP: 112/58   Pulse: (!) 112   Resp: 16   Temp: 98 °F (36 7 °C) SpO2: 98%       Physical Exam  Constitutional:       Appearance: Normal appearance  HENT:      Head: Normocephalic and atraumatic  Right Ear: External ear normal       Left Ear: External ear normal       Nose: Nose normal    Eyes:      Extraocular Movements: Extraocular movements intact  Pupils: Pupils are equal, round, and reactive to light  Cardiovascular:      Rate and Rhythm: Normal rate and regular rhythm  Pulses: Normal pulses  Heart sounds: Normal heart sounds  Pulmonary:      Effort: Pulmonary effort is normal    Abdominal:      General: Abdomen is flat  Palpations: Abdomen is soft  Musculoskeletal:         General: Normal range of motion  Cervical back: Normal range of motion and neck supple  Skin:     General: Skin is warm and dry  Neurological:      General: No focal deficit present  Mental Status: She is alert and oriented to person, place, and time  Psychiatric:         Mood and Affect: Mood normal          Behavior: Behavior normal          Thought Content: Thought content normal          Judgment: Judgment normal            Results:  Labs:  none    Imaging  CT 9/17/21:   Scan still not read officially, but on my review, gastric tumor seems smaller compared to a scan from this past spring  I reviewed the above laboratory and imaging data  Discussion/Summary:  Gastrointestinal stromal tumor, with apparent good response to therapy  Follow-up in 4 months with repeat scan at that time  Will present at tumor board to discuss treatment course including potential surgery  All questions answered

## 2021-09-23 ENCOUNTER — OFFICE VISIT (OUTPATIENT)
Dept: HEMATOLOGY ONCOLOGY | Facility: CLINIC | Age: 81
End: 2021-09-23
Payer: MEDICARE

## 2021-09-23 VITALS
OXYGEN SATURATION: 98 % | DIASTOLIC BLOOD PRESSURE: 58 MMHG | SYSTOLIC BLOOD PRESSURE: 118 MMHG | WEIGHT: 100 LBS | HEIGHT: 60 IN | TEMPERATURE: 96.9 F | RESPIRATION RATE: 16 BRPM | BODY MASS INDEX: 19.63 KG/M2 | HEART RATE: 113 BPM

## 2021-09-23 DIAGNOSIS — C49.A2 MALIGNANT GASTROINTESTINAL STROMAL TUMOR (GIST) OF STOMACH (HCC): Primary | ICD-10-CM

## 2021-09-23 DIAGNOSIS — E46 PROTEIN-CALORIE MALNUTRITION, UNSPECIFIED SEVERITY (HCC): ICD-10-CM

## 2021-09-23 DIAGNOSIS — N18.2 CKD (CHRONIC KIDNEY DISEASE) STAGE 2, GFR 60-89 ML/MIN: ICD-10-CM

## 2021-09-23 PROCEDURE — 99214 OFFICE O/P EST MOD 30 MIN: CPT | Performed by: NURSE PRACTITIONER

## 2021-09-23 NOTE — PROGRESS NOTES
[id number] [facility] [place]  [address] [facility] [id number] [phone]  Progress Note  Tami Lindquist, 1940, [id number]  9/23/2021    Assessment/Plan:  1  Malignant gastrointestinal stromal tumor (GIST) of stomach ([facility] [address] )  2  Protein-calorie malnutrition, unspecified severity ([facility] [address] )  3  CKD (chronic kidney disease) stage 2, GFR 60-89 ml/min   Patient is an 80-year-old female with a history of just currently on Gleevec 400 mg p o  daily  She tolerates this without difficulty although does report nausea  She is managing this with Zofran  Per patient's daughter she does not have much dietary intake and states she does not drink much fluids  Her mouth and tongue are dry on physical exam   I strongly encouraged her to increase her fluid intake and gave her several suggestions including vitamin water, juice, plain water, Rickey-Aid etc  we reviewed her most recent CMP which reveals a creatinine of 0 60 and EGFR 86 which indicates she is in stage 2 chronic kidney disease  Patient does tolerate a protein shake daily and her weight has been slowly improving  She continues oral iron supplement, saturation is 26% with a ferritin level of 75  We reviewed her most recent CT scan from 09/17/2021 which reveals a stable liver lesion, mild decrease in the size of the gastric lesion, bilateral increasing effusion with mild increase in ascites and increasing edema in the abdomen wall and increasing pericardial effusion  She also has new development of in measurable lung nodules that are indeterminate  Short interval follow-up at 3 months is suggested  Patient has a CT scan ordered for January 2022  We will plan to see patient in 3 months with repeat blood work  Patient and her daughter verbalized understanding and is in agreement with the plan  - CBC and differential; Future  - Comprehensive metabolic panel;  Future    Goals and Barriers:    Current Goal: Prolong Survival from Cancer  Barriers: None  Patient's Capacity to Self Care:  Patient is able to self care   -------------------------------------------------------------------------------------------------------    Chief Complaint   Patient presents with    Follow-up       History of present illness/Cancer History:   Oncology History   GIST (gastrointestinal stromal tumor), malignant ([facility] [address] )   2020 Biopsy    Gastric mass FNA:  - Positive for neoplasm consistent with a gastrointestinal stromal tumor (GIST)     2020 -  Chemotherapy    Gleevec 400mg PO daily          Cancer Staging  No matching staging information was found for the patient  ECO - Symptomatic but completely ambulatory    Interval history:  Clinically stable     Review of Systems   Constitutional: Positive for appetite change (decreased appetite)  Negative for activity change, fatigue, fever and unexpected weight change  Respiratory: Negative for cough and shortness of breath  Cardiovascular: Negative for chest pain and leg swelling  Gastrointestinal: Positive for nausea  Negative for abdominal pain, constipation and diarrhea  Endocrine: Negative for cold intolerance and heat intolerance  Musculoskeletal: Negative for arthralgias and myalgias  Skin: Negative  Neurological: Negative for dizziness, weakness and headaches  Hematological: Negative for adenopathy  Does not bruise/bleed easily  Current Outpatient Medications:     ferrous sulfate 324 (65 Fe) mg, Take 1 tablet (324 mg total) by mouth daily before breakfast, Disp: 30 tablet, Rfl: 6    hydrochlorothiazide (HYDRODIURIL) 25 mg tablet, TAKE 0 5 TABLETS (12 5 MG TOTAL) BY MOUTH DAILY AS NEEDED (FOOT SWELLING OR PUFFINESS), Disp: 20 tablet, Rfl: 0    imatinib (GLEEVEC) 400 mg tablet, Take 1 tablet (400 mg total) by mouth daily  , Disp: 30 tablet, Rfl: 10    loratadine (CLARITIN) 10 mg tablet, Take by mouth, Disp: , Rfl:     ondansetron (ZOFRAN-ODT) 4 mg disintegrating tablet, TAKE 1 TABLET (4 MG TOTAL) BY MOUTH EVERY 6 (SIX) HOURS AS NEEDED FOR NAUSEA OR VOMITING, Disp: 60 tablet, Rfl: 1    pantoprazole (PROTONIX) 20 mg tablet, TAKE 1 TABLET (20 MG TOTAL) BY MOUTH DAILY TO PREVENT HEARTBURN, Disp: 30 tablet, Rfl: 1    traMADol (ULTRAM) 50 mg tablet, Take 1 tablet (50 mg total) by mouth every 6 (six) hours as needed (cancer pain  Max 4 tabs a day ), Disp: 120 tablet, Rfl: 0    Allergies   Allergen Reactions    Bactrim [Sulfamethoxazole-Trimethoprim]     Simvastatin Myalgia       Advance Directive and Living Will:        Objective:   /58 (BP Location: Left arm, Patient Position: Sitting, Cuff Size: Standard)   Pulse (!) 113   Temp (!) 96 9 °F (36 1 °C) (Temporal)   Resp 16   Ht 5' (1 524 m)   Wt 45 4 kg (100 lb)   SpO2 98%   BMI 19 53 kg/m²   Wt Readings from Last 6 Encounters:   09/23/21 45 4 kg (100 lb)   09/22/21 45 4 kg (100 lb)   07/08/21 44 2 kg (97 lb 8 oz)   05/12/21 45 8 kg (101 lb)   05/06/21 45 8 kg (101 lb)   05/03/21 45 kg (99 lb 3 3 oz)       Physical Exam  Vitals reviewed  Constitutional:       Appearance: Normal appearance  She is well-developed  HENT:      Head: Normocephalic and atraumatic  Eyes:      Pupils: Pupils are equal, round, and reactive to light  Cardiovascular:      Rate and Rhythm: Normal rate and regular rhythm  Pulses: Normal pulses  Heart sounds: Normal heart sounds  Pulmonary:      Effort: Pulmonary effort is normal  No respiratory distress  Breath sounds: Normal breath sounds  Abdominal:      General: Bowel sounds are normal       Palpations: Abdomen is soft  Musculoskeletal:         General: Normal range of motion  Cervical back: Normal range of motion and neck supple  Lymphadenopathy:      Cervical: No cervical adenopathy  Skin:     General: Skin is warm and dry  Capillary Refill: Capillary refill takes less than 2 seconds     Neurological:      Mental Status: She is alert and oriented to person, place, and time  Psychiatric:         Behavior: Behavior normal          Pertinent Laboratory Results and Imaging Review:  Appointment on 09/10/2021   Component Date Value Ref Range Status    WBC 09/10/2021 5 82  4 31 - 10 16 Thousand/uL Final    RBC 09/10/2021 3 62* 3 81 - 5 12 Million/uL Final    Hemoglobin 09/10/2021 11 7  11 5 - 15 4 g/dL Final    Hematocrit 09/10/2021 35 9  34 8 - 46 1 % Final    MCV 09/10/2021 99* 82 - 98 fL Final    MCH 09/10/2021 32 3  26 8 - 34 3 pg Final    MCHC 09/10/2021 32 6  31 4 - 37 4 g/dL Final    RDW 09/10/2021 14 2  11 6 - 15 1 % Final    MPV 09/10/2021 10 5  8 9 - 12 7 fL Final    Platelets 83/65/2545 150  149 - 390 Thousands/uL Final    Sodium 09/10/2021 133* 136 - 145 mmol/L Final    Potassium 09/10/2021 4 1  3 5 - 5 3 mmol/L Final    Chloride 09/10/2021 103  100 - 108 mmol/L Final    CO2 09/10/2021 27  21 - 32 mmol/L Final    ANION GAP 09/10/2021 3* 4 - 13 mmol/L Final    BUN 09/10/2021 12  5 - 25 mg/dL Final    Creatinine 09/10/2021 0 60  0 60 - 1 30 mg/dL Final    Standardized to IDMS reference method    Glucose 09/10/2021 93  65 - 140 mg/dL Final    If the patient is fasting, the ADA then defines impaired fasting glucose as > 100 mg/dL and diabetes as > or equal to 123 mg/dL  Specimen collection should occur prior to Sulfasalazine administration due to the potential for falsely depressed results  Specimen collection should occur prior to Sulfapyridine administration due to the potential for falsely elevated results   Calcium 09/10/2021 8 9  8 3 - 10 1 mg/dL Final    Corrected Calcium 09/10/2021 9 7  8 3 - 10 1 mg/dL Final    AST 09/10/2021 24  5 - 45 U/L Final    Specimen collection should occur prior to Sulfasalazine administration due to the potential for falsely depressed results       ALT 09/10/2021 17  12 - 78 U/L Final    Specimen collection should occur prior to Sulfasalazine and/or Sulfapyridine administration due to the potential for falsely depressed results   Alkaline Phosphatase 09/10/2021 101  46 - 116 U/L Final    Total Protein 09/10/2021 6 2* 6 4 - 8 2 g/dL Final    Albumin 09/10/2021 3 0* 3 5 - 5 0 g/dL Final    Total Bilirubin 09/10/2021 0 85  0 20 - 1 00 mg/dL Final    Use of this assay is not recommended for patients undergoing treatment with eltrombopag due to the potential for falsely elevated results   eGFR 09/10/2021 86  ml/min/1 73sq m Final    Iron Saturation 09/10/2021 26  15 - 50 % Final    TIBC 09/10/2021 299  250 - 450 ug/dL Final    Iron 09/10/2021 79  50 - 170 ug/dL Final    Patients treated with metal-binding drugs (ie  Deferoxamine) may have depressed iron values   Ferritin 09/10/2021 75  8 - 388 ng/mL Final    Segmented % 09/10/2021 82* 43 - 75 % Final    Bands % 09/10/2021 1  0 - 8 % Final    Lymphocytes % 09/10/2021 5* 14 - 44 % Final    Monocytes % 09/10/2021 8  4 - 12 % Final    Eosinophils, % 09/10/2021 4  0 - 6 % Final    Basophils % 09/10/2021 0  0 - 1 % Final    Absolute Neutrophils 09/10/2021 4 83  1 85 - 7 62 Thousand/uL Final    Lymphocytes Absolute 09/10/2021 0 29* 0 60 - 4 47 Thousand/uL Final    Monocytes Absolute 09/10/2021 0 47  0 00 - 1 22 Thousand/uL Final    Eosinophils Absolute 09/10/2021 0 23  0 00 - 0 40 Thousand/uL Final    Basophils Absolute 09/10/2021 0 00  0 00 - 0 10 Thousand/uL Final    Platelet Estimate 66/70/3465 Adequate  Adequate Final    Artifact 09/10/2021 Present   Final     The following historical data was reviewed      Past Medical History:   Diagnosis Date    High cholesterol        Past Surgical History:   Procedure Laterality Date    FL INJECTION LEFT HIP (NON ARTHROGRAM)  5/14/2019       Social History     Socioeconomic History    Marital status: /Civil Union     Spouse name: None    Number of children: None    Years of education: None    Highest education level: None   Occupational History    None Tobacco Use    Smoking status: Never Smoker    Smokeless tobacco: Never Used   Vaping Use    Vaping Use: Never used   Substance and Sexual Activity    Alcohol use: Yes     Comment: occasional    Drug use: Never    Sexual activity: None   Other Topics Concern    None   Social History Narrative    None     Social Determinants of Health     Financial Resource Strain:     Difficulty of Paying Living Expenses:    Food Insecurity:     Worried About Running Out of Food in the Last Year:     Ran Out of Food in the Last Year:    Transportation Needs:     Lack of Transportation (Medical):  Lack of Transportation (Non-Medical):    Physical Activity:     Days of Exercise per Week:     Minutes of Exercise per Session:    Stress:     Feeling of Stress :    Social Connections:     Frequency of Communication with Friends and Family:     Frequency of Social Gatherings with Friends and Family:     Attends [employer] Services:     Active Member of Clubs or Organizations:     Attends Club or Organization Meetings:     Marital Status:    Intimate Partner Violence:     Fear of Current or Ex-Partner:     Emotionally Abused:     Physically Abused:     Sexually Abused:        No family history on file  Please note: This report has been generated by a voice recognition software system  Therefore there may be syntax, spelling, and/or grammatical errors  Please call if you have any questions

## 2021-10-21 ENCOUNTER — DOCUMENTATION (OUTPATIENT)
Dept: HEMATOLOGY ONCOLOGY | Facility: CLINIC | Age: 81
End: 2021-10-21

## 2021-11-17 ENCOUNTER — APPOINTMENT (OUTPATIENT)
Dept: LAB | Facility: CLINIC | Age: 81
End: 2021-11-17
Payer: MEDICARE

## 2021-11-17 DIAGNOSIS — C49.A2 MALIGNANT GASTROINTESTINAL STROMAL TUMOR (GIST) OF STOMACH (HCC): ICD-10-CM

## 2021-11-17 DIAGNOSIS — N18.2 CKD (CHRONIC KIDNEY DISEASE) STAGE 2, GFR 60-89 ML/MIN: ICD-10-CM

## 2021-11-17 DIAGNOSIS — E46 PROTEIN-CALORIE MALNUTRITION, UNSPECIFIED SEVERITY (HCC): ICD-10-CM

## 2021-11-17 LAB
ALBUMIN SERPL BCP-MCNC: 3 G/DL (ref 3.5–5)
ALP SERPL-CCNC: 86 U/L (ref 46–116)
ALT SERPL W P-5'-P-CCNC: 12 U/L (ref 12–78)
ANION GAP SERPL CALCULATED.3IONS-SCNC: 4 MMOL/L (ref 4–13)
AST SERPL W P-5'-P-CCNC: 16 U/L (ref 5–45)
BASOPHILS # BLD AUTO: 0.04 THOUSANDS/ΜL (ref 0–0.1)
BASOPHILS NFR BLD AUTO: 1 % (ref 0–1)
BILIRUB SERPL-MCNC: 0.78 MG/DL (ref 0.2–1)
BUN SERPL-MCNC: 13 MG/DL (ref 5–25)
CALCIUM ALBUM COR SERPL-MCNC: 10.1 MG/DL (ref 8.3–10.1)
CALCIUM SERPL-MCNC: 9.3 MG/DL (ref 8.3–10.1)
CHLORIDE SERPL-SCNC: 102 MMOL/L (ref 100–108)
CO2 SERPL-SCNC: 26 MMOL/L (ref 21–32)
CREAT SERPL-MCNC: 0.52 MG/DL (ref 0.6–1.3)
EOSINOPHIL # BLD AUTO: 0.29 THOUSAND/ΜL (ref 0–0.61)
EOSINOPHIL NFR BLD AUTO: 5 % (ref 0–6)
ERYTHROCYTE [DISTWIDTH] IN BLOOD BY AUTOMATED COUNT: 14.1 % (ref 11.6–15.1)
GFR SERPL CREATININE-BSD FRML MDRD: 90 ML/MIN/1.73SQ M
GLUCOSE SERPL-MCNC: 105 MG/DL (ref 65–140)
HCT VFR BLD AUTO: 31.9 % (ref 34.8–46.1)
HGB BLD-MCNC: 10.9 G/DL (ref 11.5–15.4)
IMM GRANULOCYTES # BLD AUTO: 0.03 THOUSAND/UL (ref 0–0.2)
IMM GRANULOCYTES NFR BLD AUTO: 1 % (ref 0–2)
LYMPHOCYTES # BLD AUTO: 0.35 THOUSANDS/ΜL (ref 0.6–4.47)
LYMPHOCYTES NFR BLD AUTO: 6 % (ref 14–44)
MCH RBC QN AUTO: 34.3 PG (ref 26.8–34.3)
MCHC RBC AUTO-ENTMCNC: 34.2 G/DL (ref 31.4–37.4)
MCV RBC AUTO: 100 FL (ref 82–98)
MONOCYTES # BLD AUTO: 0.76 THOUSAND/ΜL (ref 0.17–1.22)
MONOCYTES NFR BLD AUTO: 12 % (ref 4–12)
NEUTROPHILS # BLD AUTO: 4.72 THOUSANDS/ΜL (ref 1.85–7.62)
NEUTS SEG NFR BLD AUTO: 75 % (ref 43–75)
NRBC BLD AUTO-RTO: 0 /100 WBCS
PLATELET # BLD AUTO: 208 THOUSANDS/UL (ref 149–390)
PMV BLD AUTO: 8.6 FL (ref 8.9–12.7)
POTASSIUM SERPL-SCNC: 4.6 MMOL/L (ref 3.5–5.3)
PROT SERPL-MCNC: 6.6 G/DL (ref 6.4–8.2)
RBC # BLD AUTO: 3.18 MILLION/UL (ref 3.81–5.12)
SODIUM SERPL-SCNC: 132 MMOL/L (ref 136–145)
WBC # BLD AUTO: 6.19 THOUSAND/UL (ref 4.31–10.16)

## 2021-11-17 PROCEDURE — 80053 COMPREHEN METABOLIC PANEL: CPT

## 2021-11-17 PROCEDURE — 36415 COLL VENOUS BLD VENIPUNCTURE: CPT

## 2021-11-17 PROCEDURE — 85025 COMPLETE CBC W/AUTO DIFF WBC: CPT

## 2021-11-17 RX ORDER — PANTOPRAZOLE SODIUM 20 MG/1
20 TABLET, DELAYED RELEASE ORAL DAILY
Qty: 30 TABLET | Refills: 1 | Status: SHIPPED | OUTPATIENT
Start: 2021-11-17 | End: 2022-01-17

## 2021-11-24 ENCOUNTER — OFFICE VISIT (OUTPATIENT)
Dept: HEMATOLOGY ONCOLOGY | Facility: CLINIC | Age: 81
End: 2021-11-24
Payer: MEDICARE

## 2021-11-24 VITALS
RESPIRATION RATE: 17 BRPM | SYSTOLIC BLOOD PRESSURE: 112 MMHG | HEIGHT: 60 IN | TEMPERATURE: 97.8 F | DIASTOLIC BLOOD PRESSURE: 62 MMHG | HEART RATE: 102 BPM | OXYGEN SATURATION: 98 % | BODY MASS INDEX: 17.91 KG/M2 | WEIGHT: 91.2 LBS

## 2021-11-24 DIAGNOSIS — D53.9 MACROCYTIC ANEMIA: ICD-10-CM

## 2021-11-24 DIAGNOSIS — D50.0 IRON DEFICIENCY ANEMIA DUE TO CHRONIC BLOOD LOSS: ICD-10-CM

## 2021-11-24 DIAGNOSIS — C49.A2 MALIGNANT GASTROINTESTINAL STROMAL TUMOR (GIST) OF STOMACH (HCC): Primary | ICD-10-CM

## 2021-11-24 DIAGNOSIS — R21 RASH: ICD-10-CM

## 2021-11-24 DIAGNOSIS — E87.1 HYPONATREMIA: ICD-10-CM

## 2021-11-24 DIAGNOSIS — G31.84 MILD COGNITIVE IMPAIRMENT WITH MEMORY LOSS: Chronic | ICD-10-CM

## 2021-11-24 PROBLEM — D69.6 DECREASED PLATELET COUNT (HCC): Status: RESOLVED | Noted: 2021-07-08 | Resolved: 2021-11-24

## 2021-11-24 PROBLEM — E87.6 HYPOKALEMIA: Status: RESOLVED | Noted: 2020-12-05 | Resolved: 2021-11-24

## 2021-11-24 PROBLEM — Z09 HOSPITAL DISCHARGE FOLLOW-UP: Status: RESOLVED | Noted: 2020-12-19 | Resolved: 2021-11-24

## 2021-11-24 PROCEDURE — 99215 OFFICE O/P EST HI 40 MIN: CPT | Performed by: INTERNAL MEDICINE

## 2021-11-29 ENCOUNTER — HOSPITAL ENCOUNTER (EMERGENCY)
Facility: HOSPITAL | Age: 81
Discharge: HOME | End: 2021-11-29
Attending: EMERGENCY MEDICINE | Admitting: EMERGENCY MEDICINE
Payer: MEDICARE

## 2021-11-29 ENCOUNTER — APPOINTMENT (EMERGENCY)
Dept: RADIOLOGY | Facility: HOSPITAL | Age: 81
End: 2021-11-29
Payer: MEDICARE

## 2021-11-29 VITALS
TEMPERATURE: 98.3 F | DIASTOLIC BLOOD PRESSURE: 74 MMHG | OXYGEN SATURATION: 99 % | RESPIRATION RATE: 16 BRPM | SYSTOLIC BLOOD PRESSURE: 174 MMHG | HEART RATE: 100 BPM

## 2021-11-29 DIAGNOSIS — S42.291A HUMERAL HEAD FRACTURE, RIGHT, CLOSED, INITIAL ENCOUNTER: Primary | ICD-10-CM

## 2021-11-29 PROCEDURE — G1004 CDSM NDSC: HCPCS

## 2021-11-29 PROCEDURE — 70450 CT HEAD/BRAIN W/O DYE: CPT

## 2021-11-29 PROCEDURE — 96372 THER/PROPH/DIAG INJ SC/IM: CPT

## 2021-11-29 PROCEDURE — 99285 EMERGENCY DEPT VISIT HI MDM: CPT | Performed by: EMERGENCY MEDICINE

## 2021-11-29 PROCEDURE — 99284 EMERGENCY DEPT VISIT MOD MDM: CPT

## 2021-11-29 PROCEDURE — 73030 X-RAY EXAM OF SHOULDER: CPT

## 2021-11-29 RX ORDER — ACETAMINOPHEN 325 MG/1
975 TABLET ORAL ONCE
Status: COMPLETED | OUTPATIENT
Start: 2021-11-29 | End: 2021-11-29

## 2021-11-29 RX ORDER — KETOROLAC TROMETHAMINE 30 MG/ML
15 INJECTION, SOLUTION INTRAMUSCULAR; INTRAVENOUS ONCE
Status: DISCONTINUED | OUTPATIENT
Start: 2021-11-29 | End: 2021-11-29

## 2021-11-29 RX ORDER — OXYCODONE HYDROCHLORIDE 5 MG/1
2.5 TABLET ORAL EVERY 6 HOURS PRN
Qty: 4 TABLET | Refills: 0 | Status: SHIPPED | OUTPATIENT
Start: 2021-11-29 | End: 2021-12-01

## 2021-11-29 RX ORDER — OXYCODONE HYDROCHLORIDE 5 MG/1
2.5 TABLET ORAL ONCE
Status: COMPLETED | OUTPATIENT
Start: 2021-11-29 | End: 2021-11-29

## 2021-11-29 RX ORDER — KETOROLAC TROMETHAMINE 30 MG/ML
15 INJECTION, SOLUTION INTRAMUSCULAR; INTRAVENOUS ONCE
Status: COMPLETED | OUTPATIENT
Start: 2021-11-29 | End: 2021-11-29

## 2021-11-29 RX ADMIN — KETOROLAC TROMETHAMINE 15 MG: 30 INJECTION, SOLUTION INTRAMUSCULAR at 21:07

## 2021-11-29 RX ADMIN — OXYCODONE HYDROCHLORIDE 2.5 MG: 5 TABLET ORAL at 21:06

## 2021-11-29 RX ADMIN — ACETAMINOPHEN 975 MG: 325 TABLET, FILM COATED ORAL at 20:59

## 2021-11-30 ENCOUNTER — TELEPHONE (OUTPATIENT)
Dept: OBGYN CLINIC | Facility: OTHER | Age: 81
End: 2021-11-30

## 2021-12-02 ENCOUNTER — TELEPHONE (OUTPATIENT)
Dept: OBGYN CLINIC | Facility: OTHER | Age: 81
End: 2021-12-02

## 2021-12-02 RX ORDER — OXYCODONE HCL 5 MG/5 ML
2.5 SOLUTION, ORAL ORAL EVERY 4 HOURS PRN
Qty: 20 ML | Refills: 0 | Status: SHIPPED | OUTPATIENT
Start: 2021-12-02 | End: 2021-12-21

## 2021-12-06 ENCOUNTER — OFFICE VISIT (OUTPATIENT)
Dept: OBGYN CLINIC | Facility: OTHER | Age: 81
End: 2021-12-06
Payer: MEDICARE

## 2021-12-06 VITALS
DIASTOLIC BLOOD PRESSURE: 70 MMHG | WEIGHT: 86.4 LBS | HEIGHT: 60 IN | HEART RATE: 112 BPM | SYSTOLIC BLOOD PRESSURE: 120 MMHG | BODY MASS INDEX: 16.96 KG/M2

## 2021-12-06 DIAGNOSIS — S42.294A OTHER CLOSED NONDISPLACED FRACTURE OF PROXIMAL END OF RIGHT HUMERUS, INITIAL ENCOUNTER: Primary | ICD-10-CM

## 2021-12-06 PROBLEM — S42.201A CLOSED FRACTURE OF RIGHT PROXIMAL HUMERUS: Status: ACTIVE | Noted: 2021-12-06

## 2021-12-06 PROCEDURE — 23600 CLTX PROX HUMRL FX W/O MNPJ: CPT | Performed by: ORTHOPAEDIC SURGERY

## 2021-12-06 PROCEDURE — 99214 OFFICE O/P EST MOD 30 MIN: CPT | Performed by: ORTHOPAEDIC SURGERY

## 2021-12-08 ENCOUNTER — TELEPHONE (OUTPATIENT)
Dept: OBGYN CLINIC | Facility: HOSPITAL | Age: 81
End: 2021-12-08

## 2021-12-08 DIAGNOSIS — S42.294A OTHER CLOSED NONDISPLACED FRACTURE OF PROXIMAL END OF RIGHT HUMERUS, INITIAL ENCOUNTER: Primary | ICD-10-CM

## 2021-12-17 ENCOUNTER — TRANSITIONAL CARE MANAGEMENT (OUTPATIENT)
Dept: FAMILY MEDICINE CLINIC | Facility: CLINIC | Age: 81
End: 2021-12-17

## 2021-12-17 RX ORDER — MIRTAZAPINE 15 MG/1
TABLET, FILM COATED ORAL
COMMUNITY
Start: 2021-12-04 | End: 2022-02-07 | Stop reason: SDUPTHER

## 2021-12-17 RX ORDER — MIRTAZAPINE 7.5 MG/1
TABLET, FILM COATED ORAL
COMMUNITY
Start: 2021-12-04 | End: 2022-04-21 | Stop reason: SDUPTHER

## 2021-12-21 ENCOUNTER — OFFICE VISIT (OUTPATIENT)
Dept: FAMILY MEDICINE CLINIC | Facility: CLINIC | Age: 81
End: 2021-12-21
Payer: MEDICARE

## 2021-12-21 VITALS
TEMPERATURE: 98.1 F | SYSTOLIC BLOOD PRESSURE: 104 MMHG | HEIGHT: 60 IN | BODY MASS INDEX: 16.88 KG/M2 | HEART RATE: 104 BPM | DIASTOLIC BLOOD PRESSURE: 50 MMHG | OXYGEN SATURATION: 100 % | RESPIRATION RATE: 16 BRPM | WEIGHT: 86 LBS

## 2021-12-21 DIAGNOSIS — C49.A2 MALIGNANT GASTROINTESTINAL STROMAL TUMOR (GIST) OF STOMACH (HCC): ICD-10-CM

## 2021-12-21 DIAGNOSIS — F02.80 LATE ONSET ALZHEIMER'S DEMENTIA WITHOUT BEHAVIORAL DISTURBANCE (HCC): Primary | ICD-10-CM

## 2021-12-21 DIAGNOSIS — G30.1 LATE ONSET ALZHEIMER'S DEMENTIA WITHOUT BEHAVIORAL DISTURBANCE (HCC): Primary | ICD-10-CM

## 2021-12-21 DIAGNOSIS — S42.294A OTHER CLOSED NONDISPLACED FRACTURE OF PROXIMAL END OF RIGHT HUMERUS, INITIAL ENCOUNTER: ICD-10-CM

## 2021-12-21 DIAGNOSIS — I10 PRIMARY HYPERTENSION: ICD-10-CM

## 2021-12-21 PROBLEM — M51.26 LUMBAR DISC HERNIATION: Status: RESOLVED | Noted: 2019-05-20 | Resolved: 2021-12-21

## 2021-12-21 PROBLEM — R21 RASH: Status: RESOLVED | Noted: 2021-11-24 | Resolved: 2021-12-21

## 2021-12-21 PROBLEM — E78.00 ELEVATED CHOLESTEROL: Status: RESOLVED | Noted: 2019-03-28 | Resolved: 2021-12-21

## 2021-12-21 PROBLEM — R65.10 SIRS (SYSTEMIC INFLAMMATORY RESPONSE SYNDROME) (HCC): Status: RESOLVED | Noted: 2020-12-05 | Resolved: 2021-12-21

## 2021-12-21 PROBLEM — R20.0 NUMBNESS AND TINGLING OF BOTH FEET: Status: RESOLVED | Noted: 2019-07-10 | Resolved: 2021-12-21

## 2021-12-21 PROBLEM — T80.1XXA IV INFILTRATE, INITIAL ENCOUNTER: Status: RESOLVED | Noted: 2020-12-05 | Resolved: 2021-12-21

## 2021-12-21 PROBLEM — E87.1 HYPONATREMIA: Status: RESOLVED | Noted: 2021-11-24 | Resolved: 2021-12-21

## 2021-12-21 PROBLEM — G30.9 ALZHEIMER'S DEMENTIA (HCC): Status: ACTIVE | Noted: 2021-12-21

## 2021-12-21 PROBLEM — G31.84 MILD COGNITIVE IMPAIRMENT WITH MEMORY LOSS: Chronic | Status: RESOLVED | Noted: 2021-05-04 | Resolved: 2021-12-21

## 2021-12-21 PROBLEM — R20.0 NUMBNESS: Status: RESOLVED | Noted: 2019-03-28 | Resolved: 2021-12-21

## 2021-12-21 PROBLEM — R10.32 LEFT GROIN PAIN: Status: RESOLVED | Noted: 2019-03-28 | Resolved: 2021-12-21

## 2021-12-21 PROBLEM — R20.2 NUMBNESS AND TINGLING OF BOTH FEET: Status: RESOLVED | Noted: 2019-07-10 | Resolved: 2021-12-21

## 2021-12-21 PROBLEM — J32.9 SINUSITIS: Status: RESOLVED | Noted: 2019-03-28 | Resolved: 2021-12-21

## 2021-12-21 PROBLEM — K92.1 MELENA: Status: RESOLVED | Noted: 2020-12-02 | Resolved: 2021-12-21

## 2021-12-21 PROBLEM — M54.16 RADICULOPATHY, LUMBAR REGION: Status: RESOLVED | Noted: 2019-05-20 | Resolved: 2021-12-21

## 2021-12-21 PROCEDURE — 99496 TRANSJ CARE MGMT HIGH F2F 7D: CPT | Performed by: FAMILY MEDICINE

## 2021-12-23 ENCOUNTER — HOSPITAL ENCOUNTER (OUTPATIENT)
Dept: RADIOLOGY | Age: 81
Discharge: HOME | End: 2021-12-23
Payer: MEDICARE

## 2021-12-23 DIAGNOSIS — C49.A2 MALIGNANT GASTROINTESTINAL STROMAL TUMOR (GIST) OF STOMACH (HCC): ICD-10-CM

## 2021-12-23 PROCEDURE — 71260 CT THORAX DX C+: CPT

## 2021-12-23 PROCEDURE — G1004 CDSM NDSC: HCPCS

## 2021-12-23 PROCEDURE — 74177 CT ABD & PELVIS W/CONTRAST: CPT

## 2021-12-23 RX ADMIN — IOHEXOL 100 ML: 350 INJECTION, SOLUTION INTRAVENOUS at 08:53

## 2021-12-28 ENCOUNTER — TELEPHONE (OUTPATIENT)
Dept: SURGICAL ONCOLOGY | Facility: CLINIC | Age: 81
End: 2021-12-28

## 2021-12-30 ENCOUNTER — APPOINTMENT (OUTPATIENT)
Dept: LAB | Facility: CLINIC | Age: 81
End: 2021-12-30
Payer: MEDICARE

## 2021-12-30 ENCOUNTER — APPOINTMENT (OUTPATIENT)
Dept: RADIOLOGY | Facility: OTHER | Age: 81
End: 2021-12-30
Payer: MEDICARE

## 2021-12-30 ENCOUNTER — OFFICE VISIT (OUTPATIENT)
Dept: OBGYN CLINIC | Facility: OTHER | Age: 81
End: 2021-12-30

## 2021-12-30 VITALS
HEIGHT: 60 IN | HEART RATE: 109 BPM | BODY MASS INDEX: 16.8 KG/M2 | SYSTOLIC BLOOD PRESSURE: 109 MMHG | DIASTOLIC BLOOD PRESSURE: 70 MMHG

## 2021-12-30 DIAGNOSIS — S42.294D OTHER CLOSED NONDISPLACED FRACTURE OF PROXIMAL END OF RIGHT HUMERUS WITH ROUTINE HEALING, SUBSEQUENT ENCOUNTER: Primary | ICD-10-CM

## 2021-12-30 DIAGNOSIS — C49.A2 MALIGNANT GASTROINTESTINAL STROMAL TUMOR (GIST) OF STOMACH (HCC): ICD-10-CM

## 2021-12-30 DIAGNOSIS — D53.9 MACROCYTIC ANEMIA: ICD-10-CM

## 2021-12-30 DIAGNOSIS — S42.294D OTHER CLOSED NONDISPLACED FRACTURE OF PROXIMAL END OF RIGHT HUMERUS WITH ROUTINE HEALING, SUBSEQUENT ENCOUNTER: ICD-10-CM

## 2021-12-30 LAB
ALBUMIN SERPL BCP-MCNC: 2.9 G/DL (ref 3.5–5)
ALP SERPL-CCNC: 85 U/L (ref 46–116)
ALT SERPL W P-5'-P-CCNC: 12 U/L (ref 12–78)
ANION GAP SERPL CALCULATED.3IONS-SCNC: 7 MMOL/L (ref 4–13)
ANISOCYTOSIS BLD QL SMEAR: PRESENT
ARTIFACT: PRESENT
AST SERPL W P-5'-P-CCNC: 16 U/L (ref 5–45)
BASOPHILS # BLD MANUAL: 0.03 THOUSAND/UL (ref 0–0.1)
BASOPHILS NFR MAR MANUAL: 2 % (ref 0–1)
BILIRUB SERPL-MCNC: 0.95 MG/DL (ref 0.2–1)
BUN SERPL-MCNC: 13 MG/DL (ref 5–25)
CALCIUM ALBUM COR SERPL-MCNC: 9.8 MG/DL (ref 8.3–10.1)
CALCIUM SERPL-MCNC: 8.9 MG/DL (ref 8.3–10.1)
CHLORIDE SERPL-SCNC: 97 MMOL/L (ref 100–108)
CO2 SERPL-SCNC: 27 MMOL/L (ref 21–32)
CREAT SERPL-MCNC: 0.62 MG/DL (ref 0.6–1.3)
EOSINOPHIL # BLD MANUAL: 0 THOUSAND/UL (ref 0–0.4)
EOSINOPHIL NFR BLD MANUAL: 0 % (ref 0–6)
ERYTHROCYTE [DISTWIDTH] IN BLOOD BY AUTOMATED COUNT: 13.9 % (ref 11.6–15.1)
FERRITIN SERPL-MCNC: 195 NG/ML (ref 8–388)
FOLATE SERPL-MCNC: 9.1 NG/ML (ref 3.1–17.5)
GFR SERPL CREATININE-BSD FRML MDRD: 84 ML/MIN/1.73SQ M
GIANT PLATELETS BLD QL SMEAR: PRESENT
GLUCOSE SERPL-MCNC: 101 MG/DL (ref 65–140)
HCT VFR BLD AUTO: 34 % (ref 34.8–46.1)
HGB BLD-MCNC: 11.8 G/DL (ref 11.5–15.4)
IRON SATN MFR SERPL: 17 % (ref 15–50)
IRON SERPL-MCNC: 36 UG/DL (ref 50–170)
LYMPHOCYTES # BLD AUTO: 0.06 THOUSAND/UL (ref 0.6–4.47)
LYMPHOCYTES # BLD AUTO: 5 % (ref 14–44)
MCH RBC QN AUTO: 33.7 PG (ref 26.8–34.3)
MCHC RBC AUTO-ENTMCNC: 34.7 G/DL (ref 31.4–37.4)
MCV RBC AUTO: 97 FL (ref 82–98)
METAMYELOCYTES NFR BLD MANUAL: 1 % (ref 0–1)
MICROCYTES BLD QL AUTO: PRESENT
MONOCYTES # BLD AUTO: 0.59 THOUSAND/UL (ref 0–1.22)
MONOCYTES NFR BLD: 47 % (ref 4–12)
NEUTROPHILS # BLD MANUAL: 0.26 THOUSAND/UL (ref 1.85–7.62)
NEUTS BAND NFR BLD MANUAL: 14 % (ref 0–8)
NEUTS SEG NFR BLD AUTO: 7 % (ref 43–75)
PLATELET # BLD AUTO: 153 THOUSANDS/UL (ref 149–390)
PLATELET BLD QL SMEAR: ADEQUATE
PMV BLD AUTO: 10.2 FL (ref 8.9–12.7)
POLYCHROMASIA BLD QL SMEAR: PRESENT
POTASSIUM SERPL-SCNC: 3.5 MMOL/L (ref 3.5–5.3)
PROT SERPL-MCNC: 6.2 G/DL (ref 6.4–8.2)
RBC # BLD AUTO: 3.5 MILLION/UL (ref 3.81–5.12)
RBC MORPH BLD: PRESENT
SMUDGE CELLS BLD QL SMEAR: PRESENT
SODIUM SERPL-SCNC: 131 MMOL/L (ref 136–145)
TIBC SERPL-MCNC: 216 UG/DL (ref 250–450)
VARIANT LYMPHS # BLD AUTO: 24 %
VIT B12 SERPL-MCNC: 722 PG/ML (ref 100–900)
WBC # BLD AUTO: 1.25 THOUSAND/UL (ref 4.31–10.16)

## 2021-12-30 PROCEDURE — 36415 COLL VENOUS BLD VENIPUNCTURE: CPT

## 2021-12-30 PROCEDURE — 85027 COMPLETE CBC AUTOMATED: CPT

## 2021-12-30 PROCEDURE — 82746 ASSAY OF FOLIC ACID SERUM: CPT

## 2021-12-30 PROCEDURE — 83540 ASSAY OF IRON: CPT

## 2021-12-30 PROCEDURE — 82607 VITAMIN B-12: CPT

## 2021-12-30 PROCEDURE — 82728 ASSAY OF FERRITIN: CPT

## 2021-12-30 PROCEDURE — 80053 COMPREHEN METABOLIC PANEL: CPT

## 2021-12-30 PROCEDURE — 99024 POSTOP FOLLOW-UP VISIT: CPT | Performed by: PHYSICIAN ASSISTANT

## 2021-12-30 PROCEDURE — 83550 IRON BINDING TEST: CPT

## 2021-12-30 PROCEDURE — 85007 BL SMEAR W/DIFF WBC COUNT: CPT

## 2021-12-30 PROCEDURE — 73030 X-RAY EXAM OF SHOULDER: CPT

## 2022-01-03 ENCOUNTER — OFFICE VISIT (OUTPATIENT)
Dept: HEMATOLOGY ONCOLOGY | Facility: CLINIC | Age: 82
End: 2022-01-03
Payer: MEDICARE

## 2022-01-03 VITALS
WEIGHT: 83.8 LBS | HEART RATE: 108 BPM | OXYGEN SATURATION: 99 % | BODY MASS INDEX: 16.45 KG/M2 | HEIGHT: 60 IN | DIASTOLIC BLOOD PRESSURE: 68 MMHG | SYSTOLIC BLOOD PRESSURE: 104 MMHG | TEMPERATURE: 99.2 F | RESPIRATION RATE: 17 BRPM

## 2022-01-03 DIAGNOSIS — E46 PROTEIN-CALORIE MALNUTRITION, UNSPECIFIED SEVERITY (HCC): ICD-10-CM

## 2022-01-03 DIAGNOSIS — D72.819 LEUKOPENIA, UNSPECIFIED TYPE: Primary | ICD-10-CM

## 2022-01-03 DIAGNOSIS — S42.294A OTHER CLOSED NONDISPLACED FRACTURE OF PROXIMAL END OF RIGHT HUMERUS, INITIAL ENCOUNTER: ICD-10-CM

## 2022-01-03 DIAGNOSIS — C49.A2 MALIGNANT GASTROINTESTINAL STROMAL TUMOR (GIST) OF STOMACH (HCC): ICD-10-CM

## 2022-01-03 PROCEDURE — 99215 OFFICE O/P EST HI 40 MIN: CPT | Performed by: INTERNAL MEDICINE

## 2022-01-03 NOTE — PROGRESS NOTES
[facility] [place]  [address] [address]  [address] [place],[birth year], [id number]  01/03/22    Discussion:   In summary, this is an 43-year-old female history of GIST as outlined  She has been on Gleevec, tolerating this without difficulty  Recent CT scan chest abdomen pelvis showed stable subcentimeter pulmonary nodules, gastric mass, hepatic abnormalities, pleural and pericardial effusions, both small  No change over 3 months  Severe leukopenia is noted, however  Differential shows predominant monocytosis and atypical lymphocytes  Hemoglobin and platelets are normal   I am suspicious for primary bone marrow process such as acute leukemia  We reviewed that if this diagnosis were established treatment could be considered although her performance status seems to be decreasing and observation would probably be a reasonable alternative  Over the past 3 months she had fallen with resultant left humeral fracture  She was in a nursing home for 2 weeks for rehab prior to return home  She also has diminished appetite with 7 lb weight loss over the past 3 months  All of these, taken together, would be an unfavorable scenario for treatment of an acute leukemia in an octogenarian  We reviewed the nature of the bone marrow biopsy procedure  We will make arrangements for CBC later this week and I asked them to call back and let me know whether she would proceed with biopsy or not  I discussed the above with the patient  The patient her daughter voiced understanding and agreement   ______________________________________________________________________    Chief Complaint   Patient presents with    Follow-up       HPI:  Oncology History   GIST (gastrointestinal stromal tumor), malignant ([facility] [address] )   12/4/2020 Biopsy    December 2020 patient had a CT scan of the abdomen pelvis    This showed a large gastric mass with air tracking suggesting perforation  She was admitted to the hospital with sepsis syndrome  Repeat CT did not confirm perforation  EGD showed gastric mass  December 4, 2020 biopsy showed findings consistent with GIST tumor, Ki-67 5%, positive       12/30/2020 -  Chemotherapy    Gleevec 400mg PO daily         Interval History:  WBC 1 2, hemoglobin 11 8, platelet count 903   7% segs, 14% bands, 5% lymphocytes, 47% monocytes, 24 atypical lymphocytes  1 metamyelocyte  CMP shows albumin 2 9, sodium 131, otherwise normal   Iron saturation 17%, ferritin 195  Vitamin B12 722, folate 9 1, normal   CT chest abdomen pelvis shows stable gastric tumor over 3 months  Previously noted left lower lobe pulmonary nodule has resolved  Right upper lobe pulmonary nodule small, stable  Small left pleural effusion, reside resolved right pleural effusion, unchanged small pericardial effusion  ECOG-  2 - Symptomatic, <50% confined to bed    Review of Systems   Constitutional: Positive for appetite change and fatigue  Negative for diaphoresis and fever  HENT: Negative for sinus pain  Eyes: Negative for discharge  Respiratory: Negative for cough and shortness of breath  Cardiovascular: Negative for chest pain  Gastrointestinal: Negative for abdominal pain, constipation and diarrhea  Endocrine: Negative for cold intolerance  Genitourinary: Negative for difficulty urinating and hematuria  Musculoskeletal: Negative for joint swelling  Skin: Negative for rash  Allergic/Immunologic: Negative for environmental allergies  Neurological: Negative for dizziness and headaches  Hematological: Negative for adenopathy  Psychiatric/Behavioral: Negative for agitation         Past Medical History:   Diagnosis Date    Alzheimer's dementia ([facility] [address] ) 12/21/2021    Closed fracture of right proximal humerus 12/6/2021    GIST (gastrointestinal stromal tumor), malignant ([facility] [address] ) 12/14/2020    High cholesterol     HTN (hypertension) 12/4/2020    Continue labetalol and hydralazine      Patient Active Problem List   Diagnosis    Osteoporosis    Lumbar spondylosis    Iron deficiency anemia due to chronic blood loss    HTN (hypertension)    GIST (gastrointestinal stromal tumor), malignant (HCC)    Protein-calorie malnutrition (HCC)    CKD (chronic kidney disease) stage 2, GFR 60-89 ml/min    Closed fracture of right proximal humerus    Alzheimer's dementia ([facility] [address] )       Current Outpatient Medications:     ferrous sulfate 324 (65 Fe) mg, Take 1 tablet (324 mg total) by mouth daily before breakfast, Disp: 30 tablet, Rfl: 6    hydrochlorothiazide (HYDRODIURIL) 25 mg tablet, TAKE 0 5 TABLETS (12 5 MG TOTAL) BY MOUTH DAILY AS NEEDED (FOOT SWELLING OR PUFFINESS), Disp: 20 tablet, Rfl: 0    imatinib (GLEEVEC) 400 mg tablet, Take 1 tablet (400 mg total) by mouth daily  , Disp: 30 tablet, Rfl: 10    loratadine (CLARITIN) 10 mg tablet, Take by mouth, Disp: , Rfl:     mirtazapine (REMERON) 15 mg tablet, , Disp: , Rfl:     mirtazapine (REMERON) 7 5 MG tablet, , Disp: , Rfl:     ondansetron (ZOFRAN-ODT) 4 mg disintegrating tablet, TAKE 1 TABLET (4 MG TOTAL) BY MOUTH EVERY 6 (SIX) HOURS AS NEEDED FOR NAUSEA OR VOMITING, Disp: 60 tablet, Rfl: 1    pantoprazole (PROTONIX) 20 mg tablet, TAKE 1 TABLET (20 MG TOTAL) BY MOUTH DAILY TO PREVENT HEARTBURN, Disp: 30 tablet, Rfl: 1  Allergies   Allergen Reactions    Bactrim [Sulfamethoxazole-Trimethoprim]     Simvastatin Myalgia     Past Surgical History:   Procedure Laterality Date    FL INJECTION LEFT HIP (NON ARTHROGRAM)  5/14/2019     Social History     Objective:  Vitals:    01/03/22 0815   BP: 104/68   BP Location: Left arm   Patient Position: Sitting   Cuff Size: Adult   Pulse: (!) 108   Resp: 17   Temp: 99 2 °F (37 3 °C)   TempSrc: Temporal   SpO2: 99%   Weight: 38 kg (83 lb 12 8 oz)   Height: 5' (1 524 m)     Physical Exam  Constitutional:       Appearance: She is well-developed  HENT:      Head: Normocephalic and atraumatic  Eyes:      Pupils: Pupils are equal, round, and reactive to light  Cardiovascular:      Rate and Rhythm: Normal rate  Heart sounds: No murmur heard  Pulmonary:      Effort: No respiratory distress  Breath sounds: No wheezing or rales  Abdominal:      General: There is no distension  Palpations: Abdomen is soft  Tenderness: There is no abdominal tenderness  There is no rebound  Musculoskeletal:         General: No tenderness  Cervical back: Neck supple  Lymphadenopathy:      Cervical: No cervical adenopathy  Skin:     General: Skin is warm  Findings: No rash  Neurological:      Mental Status: She is alert and oriented to person, place, and time  Deep Tendon Reflexes: Reflexes normal    Psychiatric:         Thought Content: Thought content normal            Labs: I personally reviewed the labs and imaging pertinent to this patient care

## 2022-01-14 ENCOUNTER — EVALUATION (OUTPATIENT)
Dept: PHYSICAL THERAPY | Facility: REHABILITATION | Age: 82
End: 2022-01-14
Payer: MEDICARE

## 2022-01-14 DIAGNOSIS — S42.294D OTHER CLOSED NONDISPLACED FRACTURE OF PROXIMAL END OF RIGHT HUMERUS WITH ROUTINE HEALING, SUBSEQUENT ENCOUNTER: ICD-10-CM

## 2022-01-14 PROCEDURE — 97110 THERAPEUTIC EXERCISES: CPT

## 2022-01-14 PROCEDURE — 97162 PT EVAL MOD COMPLEX 30 MIN: CPT

## 2022-01-14 NOTE — PROGRESS NOTES
PT Evaluation     Today's date: 2022  Patient name: Mouna Sanders  : 1940  MRN: [id number]  Referring provider: Beverly Hearn  Dx:   Encounter Diagnosis     ICD-10-CM    1  Other closed nondisplaced fracture of proximal end of right humerus with routine healing, subsequent encounter  S42 294D Ambulatory referral to Physical Therapy     PT plan of care cert/re-cert     Patient is being discharged due to family transportation  Start Time: 1400  Stop Time: 1450  Total time in clinic (min): 50 minutes    Assessment  Assessment details: Mouna Sanders is a pleasant 80 y o  female who presents following a fall that resulted in a right humerus fracture  Pt fell on 2021  She is having minimal to no pain with active movements  She is limited in range of motion of her affected shoulder compared to her L into flexion, abduction, and external rotation  Pt requires assistance ambulating, getting out of a chair and getting on and off the table  The primary movement problem is left shoulder hypomobility resulting in pathoanatomical symptoms of humeral fracture and limiting her ability to dress independently, get out of a chair, lift, perform household chores, perform yard work, reach overhead and wash behind the back  No further referral appears necessary at this time based upon examination results  I expect she will improve in 4-6 weeks and return to her PLOF  The patient's greatest concerns are fear of not being able to keep active  Problem List:  1) left shoulder hypomobility  2) healing R humerus fracture    Etiologic factors include fall on 2021    Impairments: abnormal or restricted ROM, activity intolerance, difficulty understanding, impaired physical strength, lacks appropriate home exercise program, pain with function, safety issue, weight-bearing intolerance and poor posture     Symptom irritability: moderateUnderstanding of Dx/Px/POC: fair   Prognosis: fair  Prognosis details: Positive prognostic indicators include positive attitude toward recovery  Negative prognostic indicators include chronicity of symptoms, hypertension and poor surgical outcomes  Goals  Goals:  Patient will be independent with HEP  Patient will improve FOTO to goal  Patient will report minimal (1-2/10) pain with aggravating activities to display improvements in overall functional status  Patient will be able to get dressed independently  Patient will achieve ROM equal to that of the unaffected side    Plan  Patient would benefit from: skilled physical therapy  Planned modality interventions: cryotherapy, thermotherapy: hydrocollator packs and unattended electrical stimulation  Planned therapy interventions: IADL retraining, joint mobilization, manual therapy, massage, ADL training, activity modification, abdominal trunk stabilization, ADL retraining, balance, balance/weight bearing training, neuromuscular re-education, body mechanics training, behavior modification, strengthening, stretching, therapeutic activities, therapeutic exercise, therapeutic training, transfer training, graded exercise, graded motor, home exercise program, graded activity, gait training, functional ROM exercises, patient education and postural training  Frequency: 2x week  Duration in visits: 16  Duration in weeks: 8  Treatment plan discussed with: patient        Subjective Evaluation    History of Present Illness  Date of onset: 2021  Mechanism of injury: trauma  Mechanism of injury: Jesenia Hoskins is a 80 y o  female presenting to physical therapy on 22 with referral from MD for R humeral head fx on 2021   takes care of her at home  Denies numbness/tingling in her arm  Denies issues sleeping             Quality of life: fair    Pain  No pain reported  Current pain ratin  At best pain ratin  At worst pain ratin      Diagnostic Tests  X-ray: abnormal  Treatments  Previous treatment: physical therapy  Patient Goals  Patient goals for therapy: independence with ADLs/IADLs, increased strength and increased motion  Patient goal: Get dressed independently        Objective  Posture: increased thoracic kyphosis  Myotomes all intact b/l  Dermatome: all intact b/l             MMT         AROM          PROM    Shoulder       L       R        L           R      L     R   Flex  130 deg 110 deg 140 deg 120 deg   Abd      130 deg 95 deg   IR  L2 L4 P [facility]   ER     T1 T1 WFL 50 deg            Rhomboid         Mid Trap         Low Trap         Serratus         Infraspnatus         Teres Major         Sub Scap           Flowsheet Rows      Most Recent Value   PT/OT G-Codes    Current Score 44   Projected Score 63           Precautions: HTN, alzheimer's, hx of cancer, osteoporosis    Manuals 1/14                                                                Neuro Re-Ed 1/14            Rows/extensions             No moneys             [place] carries                                                                 Ther Ex 1/14            Pulleys             shoulder flexion dowel             Seated AAROM flexion 2x10 HEP            Table slides 2x10 abd/flex HEP            PROM                                       HEP/education 10'            Ther Activity                                       Gait Training                                       Modalities

## 2022-01-16 DIAGNOSIS — C49.A2 MALIGNANT GASTROINTESTINAL STROMAL TUMOR (GIST) OF STOMACH (HCC): ICD-10-CM

## 2022-01-17 RX ORDER — PANTOPRAZOLE SODIUM 20 MG/1
20 TABLET, DELAYED RELEASE ORAL DAILY
Qty: 30 TABLET | Refills: 1 | Status: SHIPPED | OUTPATIENT
Start: 2022-01-17 | End: 2022-03-01

## 2022-01-19 DIAGNOSIS — D50.0 IRON DEFICIENCY ANEMIA DUE TO CHRONIC BLOOD LOSS: ICD-10-CM

## 2022-01-19 RX ORDER — FERROUS SULFATE TAB EC 324 MG (65 MG FE EQUIVALENT) 324 (65 FE) MG
324 TABLET DELAYED RESPONSE ORAL
Qty: 30 TABLET | Refills: 11 | Status: SHIPPED | OUTPATIENT
Start: 2022-01-19 | End: 2022-05-24 | Stop reason: SDUPTHER

## 2022-01-21 ENCOUNTER — APPOINTMENT (OUTPATIENT)
Dept: PHYSICAL THERAPY | Facility: REHABILITATION | Age: 82
End: 2022-01-21
Payer: MEDICARE

## 2022-01-24 ENCOUNTER — APPOINTMENT (OUTPATIENT)
Dept: PHYSICAL THERAPY | Facility: REHABILITATION | Age: 82
End: 2022-01-24
Payer: MEDICARE

## 2022-01-25 ENCOUNTER — APPOINTMENT (OUTPATIENT)
Dept: LAB | Facility: CLINIC | Age: 82
End: 2022-01-25
Payer: MEDICARE

## 2022-01-25 ENCOUNTER — OFFICE VISIT (OUTPATIENT)
Dept: SURGICAL ONCOLOGY | Facility: CLINIC | Age: 82
End: 2022-01-25
Payer: MEDICARE

## 2022-01-25 VITALS
HEART RATE: 86 BPM | TEMPERATURE: 97.1 F | WEIGHT: 85 LBS | RESPIRATION RATE: 18 BRPM | DIASTOLIC BLOOD PRESSURE: 76 MMHG | SYSTOLIC BLOOD PRESSURE: 106 MMHG | HEIGHT: 60 IN | BODY MASS INDEX: 16.69 KG/M2

## 2022-01-25 DIAGNOSIS — C49.A2 MALIGNANT GASTROINTESTINAL STROMAL TUMOR (GIST) OF STOMACH (HCC): ICD-10-CM

## 2022-01-25 DIAGNOSIS — C49.A2 MALIGNANT GASTROINTESTINAL STROMAL TUMOR (GIST) OF STOMACH (HCC): Primary | ICD-10-CM

## 2022-01-25 DIAGNOSIS — D72.819 LEUKOPENIA, UNSPECIFIED TYPE: ICD-10-CM

## 2022-01-25 LAB
ANION GAP SERPL CALCULATED.3IONS-SCNC: 6 MMOL/L (ref 4–13)
BASOPHILS # BLD AUTO: 0.01 THOUSANDS/ΜL (ref 0–0.1)
BASOPHILS NFR BLD AUTO: 1 % (ref 0–1)
BUN SERPL-MCNC: 11 MG/DL (ref 5–25)
CALCIUM SERPL-MCNC: 9.2 MG/DL (ref 8.3–10.1)
CHLORIDE SERPL-SCNC: 97 MMOL/L (ref 100–108)
CO2 SERPL-SCNC: 28 MMOL/L (ref 21–32)
CREAT SERPL-MCNC: 0.53 MG/DL (ref 0.6–1.3)
EOSINOPHIL # BLD AUTO: 0.01 THOUSAND/ΜL (ref 0–0.61)
EOSINOPHIL NFR BLD AUTO: 1 % (ref 0–6)
ERYTHROCYTE [DISTWIDTH] IN BLOOD BY AUTOMATED COUNT: 15.2 % (ref 11.6–15.1)
GFR SERPL CREATININE-BSD FRML MDRD: 89 ML/MIN/1.73SQ M
GLUCOSE SERPL-MCNC: 101 MG/DL (ref 65–140)
HCT VFR BLD AUTO: 30.8 % (ref 34.8–46.1)
HGB BLD-MCNC: 10.7 G/DL (ref 11.5–15.4)
IMM GRANULOCYTES # BLD AUTO: 0.01 THOUSAND/UL (ref 0–0.2)
IMM GRANULOCYTES NFR BLD AUTO: 1 % (ref 0–2)
LYMPHOCYTES # BLD AUTO: 0.23 THOUSANDS/ΜL (ref 0.6–4.47)
LYMPHOCYTES NFR BLD AUTO: 16 % (ref 14–44)
MCH RBC QN AUTO: 33 PG (ref 26.8–34.3)
MCHC RBC AUTO-ENTMCNC: 34.7 G/DL (ref 31.4–37.4)
MCV RBC AUTO: 95 FL (ref 82–98)
MONOCYTES # BLD AUTO: 0.61 THOUSAND/ΜL (ref 0.17–1.22)
MONOCYTES NFR BLD AUTO: 40 % (ref 4–12)
NEUTROPHILS # BLD AUTO: 0.6 THOUSANDS/ΜL (ref 1.85–7.62)
NEUTS SEG NFR BLD AUTO: 41 % (ref 43–75)
NRBC BLD AUTO-RTO: 0 /100 WBCS
PLATELET # BLD AUTO: 146 THOUSANDS/UL (ref 149–390)
PMV BLD AUTO: 8.6 FL (ref 8.9–12.7)
POTASSIUM SERPL-SCNC: 3.5 MMOL/L (ref 3.5–5.3)
RBC # BLD AUTO: 3.24 MILLION/UL (ref 3.81–5.12)
SODIUM SERPL-SCNC: 131 MMOL/L (ref 136–145)
WBC # BLD AUTO: 1.47 THOUSAND/UL (ref 4.31–10.16)

## 2022-01-25 PROCEDURE — 80048 BASIC METABOLIC PNL TOTAL CA: CPT

## 2022-01-25 PROCEDURE — 85025 COMPLETE CBC W/AUTO DIFF WBC: CPT

## 2022-01-25 PROCEDURE — 36415 COLL VENOUS BLD VENIPUNCTURE: CPT

## 2022-01-25 PROCEDURE — 99213 OFFICE O/P EST LOW 20 MIN: CPT | Performed by: SURGERY

## 2022-01-25 NOTE — PROGRESS NOTES
Surgical Oncology Follow Up       [facility] [address]  Kennedi Bell [address] [id number] [phone]    Georgi Fermin  1940  [id number]  [facility] [place] [address] [id number] [phone]    Chief Complaint   Patient presents with    Follow-up       Assessment/Plan:    No problem-specific Assessment & Plan notes found for this encounter  Diagnoses and all orders for this visit:    Malignant gastrointestinal stromal tumor (GIST) of stomach [facility])        Advance Care Planning/Advance Directives:  Did not discuss medical power of  with the patient  Oncology History   GIST (gastrointestinal stromal tumor), malignant ([facility] [address] )   12/4/2020 Biopsy    December 2020 patient had a CT scan of the abdomen pelvis  This showed a large gastric mass with air tracking suggesting perforation  She was admitted to the hospital with sepsis syndrome  Repeat CT did not confirm perforation  EGD showed gastric mass  December 4, 2020 biopsy showed findings consistent with GIST tumor, Ki-67 5%, positive       12/30/2020 -  Chemotherapy    Gleevec 400mg PO daily         History of Present Illness: The patient is an 77-year-old woman with a large gastric gist who was been on [address] for about a year   -Interval History:  She is here to discuss treatment options moving forward given that she has been on the medication for year and has had decrease in size of the original tumor  She is tolerating the chemotherapy fairly well  Her appetite is poor to fair, but she is maintaining her weight  Review of Systems:  Review of Systems   Constitutional: Positive for fatigue  Negative for unexpected weight change  HENT: Negative  Eyes: Negative  Respiratory: Negative  Cardiovascular: Negative  Gastrointestinal: Negative  Endocrine: Negative  Genitourinary: Negative  Musculoskeletal: Positive for arthralgias, gait problem and myalgias  Skin: Negative  Allergic/Immunologic: Negative  Hematological: Negative  Psychiatric/Behavioral: Negative  Patient Active Problem List   Diagnosis    Osteoporosis    Lumbar spondylosis    Iron deficiency anemia due to chronic blood loss    HTN (hypertension)    GIST (gastrointestinal stromal tumor), malignant (HCC)    Protein-calorie malnutrition (HCC)    CKD (chronic kidney disease) stage 2, GFR 60-89 ml/min    Closed fracture of right proximal humerus    Alzheimer's dementia ([facility] )    Leukopenia     Past Medical History:   Diagnosis Date    Alzheimer's dementia ([facility] ) 12/21/2021    Closed fracture of right proximal humerus 12/6/2021    GIST (gastrointestinal stromal tumor), malignant ([facility] ) 12/14/2020    High cholesterol     HTN (hypertension) 12/4/2020    Continue labetalol and hydralazine      Past Surgical History:   Procedure Laterality Date    FL INJECTION LEFT HIP (NON ARTHROGRAM)  5/14/2019     No family history on file  Social History     Socioeconomic History    Marital status: /Civil Union     Spouse name: Not on file    Number of children: Not on file    Years of education: Not on file    Highest education level: Not on file   Occupational History    Not on file   Tobacco Use    Smoking status: Never Smoker    Smokeless tobacco: Never Used   Vaping Use    Vaping Use: Never used   Substance and Sexual Activity    Alcohol use: Yes     Comment: occasional    Drug use: Never    Sexual activity: Not Currently   Other Topics Concern    Not on file   Social History Narrative     since 1963  Two children  Two grandchildren  Retired  Was an RN  Worked at [employer] for [employer]   is 80years old and her primary caregiver  Daughter, Therese Patel, lives nearby       Social [facility]     Financial Resource Strain: Not on file   Food Insecurity: Not on file   Transportation Needs: Not on file   Physical Activity: Not on file   Stress: Not on file   Social Connections: Not on file   Intimate Partner Violence: Not on file   Housing Stability: Not on file       Current Outpatient Medications:     ferrous sulfate 324 (65 Fe) mg, Take 1 tablet (324 mg total) by mouth daily before breakfast, Disp: 30 tablet, Rfl: 11    hydrochlorothiazide (HYDRODIURIL) 25 mg tablet, TAKE 0 5 TABLETS (12 5 MG TOTAL) BY MOUTH DAILY AS NEEDED (FOOT SWELLING OR PUFFINESS), Disp: 20 tablet, Rfl: 0    imatinib (GLEEVEC) 400 mg tablet, Take 1 tablet (400 mg total) by mouth daily  , Disp: 30 tablet, Rfl: 10    loratadine (CLARITIN) 10 mg tablet, Take by mouth, Disp: , Rfl:     mirtazapine (REMERON) 15 mg tablet, , Disp: , Rfl:     mirtazapine (REMERON) 7 5 MG tablet, , Disp: , Rfl:     ondansetron (ZOFRAN-ODT) 4 mg disintegrating tablet, TAKE 1 TABLET (4 MG TOTAL) BY MOUTH EVERY 6 (SIX) HOURS AS NEEDED FOR NAUSEA OR VOMITING, Disp: 60 tablet, Rfl: 1    pantoprazole (PROTONIX) 20 mg tablet, TAKE 1 TABLET (20 MG TOTAL) BY MOUTH DAILY TO PREVENT HEARTBURN, Disp: 30 tablet, Rfl: 1  Allergies   Allergen Reactions    Bactrim [Sulfamethoxazole-Trimethoprim]     Simvastatin Myalgia     Vitals:    01/25/22 1002   BP: 106/76   Pulse: 86   Resp: 18   Temp: (!) 97 1 °F (36 2 °C)       Physical Exam  Vitals reviewed  HENT:      Head: Normocephalic and atraumatic  Right Ear: External ear normal       Left Ear: External ear normal    Eyes:      General: No scleral icterus  Extraocular Movements: Extraocular movements intact  Pupils: Pupils are equal, round, and reactive to light  Cardiovascular:      Rate and Rhythm: Normal rate and regular rhythm  Heart sounds: Normal heart sounds  Pulmonary:      Effort: Pulmonary effort is normal       Breath sounds: Normal breath sounds  Abdominal:      General: Abdomen is flat   Bowel sounds are normal  There is no distension  Palpations: Abdomen is soft  There is no mass  Tenderness: There is no abdominal tenderness  There is no guarding or rebound  Hernia: No hernia is present  Skin:     General: Skin is warm and dry  Neurological:      General: No focal deficit present  Mental Status: She is alert and oriented to person, place, and time  Psychiatric:         Mood and Affect: Mood normal          Behavior: Behavior normal            Results:  Labs:  CBC, Coags, BMP, Mg, Phos     Imaging    CT CHEST, ABDOMEN AND PELVIS WITH IV CONTRAST     INDICATION:   C49  A2: Gastrointestinal stromal tumor of stomach  Dr Bert Grey note from 11/24/2021 was reviewed  Patient has history of gastric GI stromal tumor, which has been treated with Gleevac  Recent CT scan demonstrated indeterminate   subcentimeter pulmonary nodules      COMPARISON:  Chest, abdomen, pelvic CT from 9/17/2021      TECHNIQUE: CT examination of the chest, abdomen and pelvis was performed  Axial, sagittal, and coronal 2D reformatted images were created from the source data and submitted for interpretation      Radiation dose length product (DLP) for this visit:  698 mGy-cm   This examination, like all CT scans performed in the [facility], was performed utilizing techniques to minimize radiation dose exposure, including the use of iterative   reconstruction and automated exposure control      IV Contrast:  100 mL of iohexol (OMNIPAQUE)  Enteric Contrast: Enteric contrast was administered      FINDINGS:     CHEST     LUNGS: There are pulmonary nodules, which will be described on series 3:  Image 52, right upper lobe, solid, smooth bordered, 2 mm, unchanged        A 5 mm left lower lobe pulmonary nodule seen on series 3 image 57 of the previous CT has resolved      PLEURA:  Decreased small left pleural effusion, and resolved right pleural effusion      HEART/GREAT VESSELS: Unchanged small pericardial effusion   No thoracic aortic aneurysm      MEDIASTINUM AND DEVAN:  Unremarkable      CHEST WALL AND LOWER NECK:   Again noted is diffusely enlarged thyroid gland      Unchanged mild bilateral axillary adenopathy      ABDOMEN     LIVER/BILIARY TREE:  Unchanged 7 mm segment 2 hypodense lesion (series 2 image 52 )     Unchanged 8 mm hypodense lesion in segment 8 (series 2 image 52 )     Unchanged 2 mm segment 8 liver lesion (series 2 image 49 and series 601 image 70 )     No new liver lesions      GALLBLADDER:  There are gallstone(s) within the gallbladder, without pericholecystic inflammatory changes      SPLEEN:  Unremarkable      PANCREAS:  Unremarkable      ADRENAL GLANDS:  Unremarkable      KIDNEYS/URETERS:  No hydronephrosis or urinary tract calculus  One or more sharply circumscribed subcentimeter renal hypodensities are present, too small to accurately characterize, and statistically most likely benign findings  According to recent   literature (Radiology 2019) no further workup of these findings is recommended      STOMACH AND BOWEL:  Again seen is a large mass adjacent to the stomach consistent with patient's primary GIST, currently 10 3 x 5 5 x 7 8 cm, unchanged compared to 9/17/2021 when measured in similar fashion  (Series 2 images 51-67 )     There is a central pocket of air within the lesion, and based on prior imaging, this is due to ulceration and communication with the gastric lumen      APPENDIX:  No findings to suggest appendicitis      ABDOMINOPELVIC CAVITY:  No ascites  No pneumoperitoneum    No lymphadenopathy      VESSELS:  Unremarkable for patient's age      PELVIS     REPRODUCTIVE ORGANS:  Unremarkable for patient's age      URINARY BLADDER:  Unremarkable      ABDOMINAL WALL/INGUINAL REGIONS:  Unremarkable      OSSEOUS STRUCTURES:  No acute fracture or destructive osseous lesion      IMPRESSION:     Again seen is a large mass adjacent to the stomach consistent with patient's primary GIST, currently 10 3 x 5 5 x 7 8 cm, unchanged compared to 9/17/2021 when measured in similar fashion  (Series 2 images 51-67 )     A small left lower lobe pulmonary nodule which developed on 9/17/2021 CT has resolved  A small right upper lobe pulmonary nodule persists  There are no new pulmonary nodules      Stable several small liver lesions  No new lesions      Unchanged mild bilateral axillary adenopathy      Decreased small left pleural effusion, and resolved right pleural effusion      Unchanged small pericardial effusion      Workstation performed: [id number]     XR shoulder 2+ vw right    Result Date: 1/5/2022  Narrative: RIGHT SHOULDER INDICATION:   [id number]: Other nondisplaced fracture of upper end of right humerus, subsequent encounter for fracture with routine healing  COMPARISON:  11/29/2021 VIEWS:  XR SHOULDER 2+ VW RIGHT Images: 3 FINDINGS: Healing impacted humeral neck fracture  Surgical anchor humeral head  Moderate osteoarthritis glenohumeral joint  No lytic or blastic osseous lesion  Soft tissues are unremarkable  Impression: Healing impacted humeral neck fracture  Workstation performed: [id number]     I reviewed the above laboratory and imaging data  Discussion/Summary:  80year-old woman with large gastrointestinal stromal tumor, 10 point 3 cm in greatest dimension which shows decreased in size compared to prior studies  Decrease in size appears of slow down between September scan in December scan from last year  She has tolerated chemotherapy well  At this point, she is not interested in pursuing surgery, though we did discuss that it would normally be something to consider at this point given that she has been on the chemotherapy for a year with at least partial response to treatment  She would like to continue chemotherapy alone at this point  She will be following with the medical oncology team closely for this reason  I will therefore see her on a p r n  Basis    We discussed surgical interventions in event of emergency such as hemorrhage, obstruction, perforation, or bleeding  She had her daughter were made aware of the symptoms  She will follow-up with us as needed

## 2022-01-28 ENCOUNTER — APPOINTMENT (OUTPATIENT)
Dept: PHYSICAL THERAPY | Facility: REHABILITATION | Age: 82
End: 2022-01-28
Payer: MEDICARE

## 2022-01-31 ENCOUNTER — APPOINTMENT (OUTPATIENT)
Dept: PHYSICAL THERAPY | Facility: REHABILITATION | Age: 82
End: 2022-01-31
Payer: MEDICARE

## 2022-01-31 ENCOUNTER — OFFICE VISIT (OUTPATIENT)
Dept: HEMATOLOGY ONCOLOGY | Facility: CLINIC | Age: 82
End: 2022-01-31
Payer: MEDICARE

## 2022-01-31 VITALS
BODY MASS INDEX: 16.3 KG/M2 | WEIGHT: 83 LBS | OXYGEN SATURATION: 99 % | RESPIRATION RATE: 18 BRPM | HEIGHT: 60 IN | SYSTOLIC BLOOD PRESSURE: 102 MMHG | HEART RATE: 109 BPM | TEMPERATURE: 97.8 F | DIASTOLIC BLOOD PRESSURE: 60 MMHG

## 2022-01-31 DIAGNOSIS — E46 PROTEIN-CALORIE MALNUTRITION, UNSPECIFIED SEVERITY (HCC): ICD-10-CM

## 2022-01-31 DIAGNOSIS — C49.A2 MALIGNANT GASTROINTESTINAL STROMAL TUMOR (GIST) OF STOMACH (HCC): Primary | ICD-10-CM

## 2022-01-31 DIAGNOSIS — D69.6 DECREASED PLATELET COUNT (HCC): ICD-10-CM

## 2022-01-31 DIAGNOSIS — D72.819 LEUKOPENIA, UNSPECIFIED TYPE: ICD-10-CM

## 2022-01-31 PROCEDURE — 99214 OFFICE O/P EST MOD 30 MIN: CPT | Performed by: NURSE PRACTITIONER

## 2022-01-31 NOTE — PROGRESS NOTES
[facility] [address] [address] [phone]  Oncology Progress Note  Georgi Fermin, 1940, [id number]  1/31/2022        Assessment/Plan:   1  Malignant gastrointestinal stromal tumor (GIST) of stomach (HCC)  2  Leukopenia, unspecified type  3  Protein-calorie malnutrition, unspecified severity ([facility] [address] )  4  Decreased platelet count ([facility] [address] )   Patient is an 26-year-old female with a history of just currently on Gleevec 400 mg p o  once daily  Patient saw surgical oncology to discuss CT findings and potential utility of surgery  Patient and her daughter are not inclined to pursue surgical interventions secondary to potential complications and patient's age  We will therefore continue on [address] as previously prescribed  Patient does tolerate Gleevec without significant difficulty  She is fatigued, sleeps frequently throughout the day, per daughter patient is unsteady on her feet requiring assistance  She does have a decreased appetite and eats very minimal   Her weight is stable from our last visit, 3 weeks ago, at 83 lb  Patient is also noted to have leukopenia with a white blood cell count 1 47  This is fairly recent initially noted in December 2021  Hemoglobin is 10 7, MCV 95, platelets 667, relative monocytes 40% and absolute lymphocytes 0 23, worrisome for a primary bone marrow process such as acute leukemia  Previously a bone marrow biopsy procedure was discussed with patient and her daughter  They do not wish to pursue a biopsy at this time secondary to patient's age and overall performance status  We discussed continuing to monitor and utilize transfusion protocol when timing is appropriate  We also discussed referring back to palliative care in the future  We will plan to see her in 6 weeks with 3 week interval blood work  Patient and her daughter verbalized understanding and are in agreement with the plan      - CBC and differential; Standing  - Comprehensive metabolic panel; Standing  - CBC and differential  - Comprehensive metabolic panel    Goals and Barriers:    Current Goal:   Prolong Survival from Cancer  Barriers: None  Patient's Capacity to Self Care:  Patient is able to self care with assistance  [address]  ______________________________________________________________________________________________________________    Subjective     History of present illness/Cancer History:   Oncology History   GIST (gastrointestinal stromal tumor), malignant [facility])   2020 Biopsy    2020 patient had a CT scan of the abdomen pelvis  This showed a large gastric mass with air tracking suggesting perforation  She was admitted to the hospital with sepsis syndrome  Repeat CT did not confirm perforation  EGD showed gastric mass    2020 biopsy showed findings consistent with GIST tumor, Ki-67 5%, positive       2020 -  Chemotherapy    Gleevec 400mg PO daily          Lab Results   Component Value Date    WBC 1 47 (LL) 2022    HGB 10 7 (L) 2022    HCT 30 8 (L) 2022    MCV 95 2022     (L) 2022     Lab Results   Component Value Date     10/09/2014    SODIUM 131 (L) 2022    K 3 5 2022    CL 97 (L) 2022    CO2 28 2022    ANIONGAP 8 10/09/2014    AGAP 6 2022    BUN 11 2022    CREATININE 0 53 (L) 2022    GLUC 101 2022    GLUF 87 2021    CALCIUM 9 2 2022    AST 16 2021    ALT 12 2021    ALKPHOS 85 2021    PROT 7 1 10/09/2014    TP 6 2 (L) 2021    BILITOT 0 80 10/09/2014    TBILI 0 95 2021    EGFR 89 2022        Interval history:  Clinically stable however increased fatigue     ECO - Symptomatic, <50% confined to bed    Review of Systems   Constitutional: Positive for activity change (More fatigued), fatigue and unexpected weight change  Negative for appetite change (Decreased) and fever  Respiratory: Negative for cough and shortness of breath  Cardiovascular: Negative for chest pain and leg swelling  Gastrointestinal: Positive for diarrhea  Negative for abdominal pain, constipation and nausea  Endocrine: Negative for cold intolerance and heat intolerance  Musculoskeletal: Negative for arthralgias and myalgias  Skin: Negative  Neurological: Negative for dizziness, weakness and headaches  Hematological: Negative for adenopathy  Does not bruise/bleed easily  Current Outpatient Medications:     ferrous sulfate 324 (65 Fe) mg, Take 1 tablet (324 mg total) by mouth daily before breakfast, Disp: 30 tablet, Rfl: 11    hydrochlorothiazide (HYDRODIURIL) 25 mg tablet, TAKE 0 5 TABLETS (12 5 MG TOTAL) BY MOUTH DAILY AS NEEDED (FOOT SWELLING OR PUFFINESS), Disp: 20 tablet, Rfl: 0    imatinib (GLEEVEC) 400 mg tablet, Take 1 tablet (400 mg total) by mouth daily  , Disp: 30 tablet, Rfl: 10    loratadine (CLARITIN) 10 mg tablet, Take by mouth, Disp: , Rfl:     mirtazapine (REMERON) 15 mg tablet, , Disp: , Rfl:     mirtazapine (REMERON) 7 5 MG tablet, , Disp: , Rfl:     ondansetron (ZOFRAN-ODT) 4 mg disintegrating tablet, TAKE 1 TABLET (4 MG TOTAL) BY MOUTH EVERY 6 (SIX) HOURS AS NEEDED FOR NAUSEA OR VOMITING, Disp: 60 tablet, Rfl: 1    pantoprazole (PROTONIX) 20 mg tablet, TAKE 1 TABLET (20 MG TOTAL) BY MOUTH DAILY TO PREVENT HEARTBURN, Disp: 30 tablet, Rfl: 1  Allergies   Allergen Reactions    Bactrim [Sulfamethoxazole-Trimethoprim]     Simvastatin Myalgia            Objective   /60 (BP Location: Right arm, Patient Position: Sitting, Cuff Size: Adult)   Pulse (!) 109   Temp 97 8 °F (36 6 °C)   Resp 18   Ht 5' (1 524 m)   Wt 37 6 kg (83 lb)   SpO2 99%   BMI 16 21 kg/m²   Wt Readings from Last 6 Encounters:   01/31/22 37 6 kg (83 lb)   01/25/22 38 6 kg (85 lb) 01/03/22 38 kg (83 lb 12 8 oz)   12/21/21 39 kg (86 lb)   12/06/21 39 2 kg (86 lb 6 4 oz)   11/24/21 41 4 kg (91 lb 3 2 oz)       Physical Exam  Vitals reviewed  Constitutional:       Appearance: She is well-developed  Comments: Frail appearing   HENT:      Head: Normocephalic and atraumatic  Eyes:      Conjunctiva/sclera: Conjunctivae normal       Pupils: Pupils are equal, round, and reactive to light  Cardiovascular:      Rate and Rhythm: Normal rate and regular rhythm  Pulses: Normal pulses  Heart sounds: Normal heart sounds  No murmur heard  Pulmonary:      Effort: Pulmonary effort is normal  No respiratory distress  Breath sounds: Normal breath sounds  Abdominal:      General: Bowel sounds are normal       Palpations: Abdomen is soft  Musculoskeletal:         General: Normal range of motion  Cervical back: Normal range of motion and neck supple  Lymphadenopathy:      Cervical: No cervical adenopathy  Skin:     General: Skin is warm and dry  Capillary Refill: Capillary refill takes less than 2 seconds  Neurological:      Mental Status: She is alert and oriented to person, place, and time  Psychiatric:         Behavior: Behavior normal        Imaging Review:  CT chest abdomen pelvis w contrast    Result Date: 12/23/2021  Narrative CT CHEST, ABDOMEN AND PELVIS WITH IV CONTRAST INDICATION:   C49  A2: Gastrointestinal stromal tumor of stomach  Dr Chloé Chapman note from 11/24/2021 was reviewed  Patient has history of gastric GI stromal tumor, which has been treated with Gleevac  Recent CT scan demonstrated indeterminate subcentimeter pulmonary nodules  COMPARISON:  Chest, abdomen, pelvic CT from 9/17/2021  TECHNIQUE: CT examination of the chest, abdomen and pelvis was performed  Axial, sagittal, and coronal 2D reformatted images were created from the source data and submitted for interpretation  Radiation dose length product (DLP) for this visit:  698 mGy-cm   This examination, like all CT scans performed in the [facility], was performed utilizing techniques to minimize radiation dose exposure, including the use of iterative reconstruction and automated exposure control  IV Contrast:  100 mL of iohexol (OMNIPAQUE) Enteric Contrast: Enteric contrast was administered  FINDINGS: CHEST LUNGS: There are pulmonary nodules, which will be described on series 3: Image 52, right upper lobe, solid, smooth bordered, 2 mm, unchanged  A 5 mm left lower lobe pulmonary nodule seen on series 3 image 57 of the previous CT has resolved  PLEURA:  Decreased small left pleural effusion, and resolved right pleural effusion  HEART/GREAT VESSELS: Unchanged small pericardial effusion  No thoracic aortic aneurysm  MEDIASTINUM AND DEVAN:  Unremarkable  CHEST WALL AND LOWER NECK:   Again noted is diffusely enlarged thyroid gland  Unchanged mild bilateral axillary adenopathy  ABDOMEN LIVER/BILIARY TREE:  Unchanged 7 mm segment 2 hypodense lesion (series 2 image 52 ) Unchanged 8 mm hypodense lesion in segment 8 (series 2 image 52 ) Unchanged 2 mm segment 8 liver lesion (series 2 image 49 and series 601 image 70 ) No new liver lesions  GALLBLADDER:  There are gallstone(s) within the gallbladder, without pericholecystic inflammatory changes  SPLEEN:  Unremarkable  PANCREAS:  Unremarkable  ADRENAL GLANDS:  Unremarkable  KIDNEYS/URETERS:  No hydronephrosis or urinary tract calculus  One or more sharply circumscribed subcentimeter renal hypodensities are present, too small to accurately characterize, and statistically most likely benign findings  According to recent literature (Radiology 2019) no further workup of these findings is recommended  STOMACH AND BOWEL:  Again seen is a large mass adjacent to the stomach consistent with patient's primary GIST, currently 10 3 x 5 5 x 7 8 cm, unchanged compared to 9/17/2021 when measured in similar fashion    (Series 2 images 51-67 ) There is a central pocket of air within the lesion, and based on prior imaging, this is due to ulceration and communication with the gastric lumen  APPENDIX:  No findings to suggest appendicitis  ABDOMINOPELVIC CAVITY:  No ascites  No pneumoperitoneum  No lymphadenopathy  VESSELS:  Unremarkable for patient's age  PELVIS REPRODUCTIVE ORGANS:  Unremarkable for patient's age  URINARY BLADDER:  Unremarkable  ABDOMINAL WALL/INGUINAL REGIONS:  Unremarkable  OSSEOUS STRUCTURES:  No acute fracture or destructive osseous lesion  Impression Again seen is a large mass adjacent to the stomach consistent with patient's primary GIST, currently 10 3 x 5 5 x 7 8 cm, unchanged compared to 9/17/2021 when measured in similar fashion  (Series 2 images 51-67 ) A small left lower lobe pulmonary nodule which developed on 9/17/2021 CT has resolved  A small right upper lobe pulmonary nodule persists  There are no new pulmonary nodules  Stable several small liver lesions  No new lesions  Unchanged mild bilateral axillary adenopathy  Decreased small left pleural effusion, and resolved right pleural effusion  Unchanged small pericardial effusion      The following historical data was reviewed:  Past Medical History:   Diagnosis Date    Alzheimer's dementia ([facility] [address] ) 12/21/2021    Closed fracture of right proximal humerus 12/6/2021    GIST (gastrointestinal stromal tumor), malignant ([facility] [address] ) 12/14/2020    High cholesterol     HTN (hypertension) 12/4/2020    Continue labetalol and hydralazine      Past Surgical History:   Procedure Laterality Date    FL INJECTION LEFT HIP (NON ARTHROGRAM)  5/14/2019     Social History     Socioeconomic History    Marital status: /Civil Union     Spouse name: None    Number of children: None    Years of education: None    Highest education level: None   Occupational History    None   Tobacco Use    Smoking status: Never Smoker    Smokeless tobacco: Never Used   Vaping Use    Vaping Use: Never used Substance and Sexual Activity    Alcohol use: Yes     Comment: occasional    Drug use: Never    Sexual activity: Not Currently   Other Topics Concern    None   Social History Narrative     since 1963  Two children  Two grandchildren  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 [employer]   is 80years old and her primary caregiver  Daughter, Samreen Nunez, lives nearby  Social Determinants of Health     Financial Resource Strain: Not on file   Food Insecurity: Not on file   Transportation Needs: Not on file   Physical Activity: Not on file   Stress: Not on file   Social Connections: Not on file   Intimate Partner Violence: Not on file   Housing Stability: Not on file     No family history on file  Please note: This report has been generated by a voice recognition software system  Therefore there may be syntax, spelling, and/or grammatical errors  Please call if you have any questions

## 2022-02-07 DIAGNOSIS — G30.1 LATE ONSET ALZHEIMER'S DEMENTIA WITHOUT BEHAVIORAL DISTURBANCE (HCC): Primary | ICD-10-CM

## 2022-02-07 DIAGNOSIS — F02.80 LATE ONSET ALZHEIMER'S DEMENTIA WITHOUT BEHAVIORAL DISTURBANCE (HCC): Primary | ICD-10-CM

## 2022-02-08 RX ORDER — MIRTAZAPINE 15 MG/1
15 TABLET, FILM COATED ORAL
Qty: 90 TABLET | Refills: 3 | Status: SHIPPED | OUTPATIENT
Start: 2022-02-08 | End: 2022-02-22 | Stop reason: ALTCHOICE

## 2022-02-08 NOTE — TELEPHONE ENCOUNTER
Spoke with patients daughter  Patient is prescribed 7 5 daily   She has been breaking the 15mg in half

## 2022-02-08 NOTE — TELEPHONE ENCOUNTER
Please contact patient's pharmacy  Please inquire who is prescribing Mirtazapine and what is the dose     Patient has both 15 mg tabs and 7 5 mg tabs on her med list

## 2022-02-10 ENCOUNTER — APPOINTMENT (OUTPATIENT)
Dept: RADIOLOGY | Facility: OTHER | Age: 82
End: 2022-02-10
Payer: MEDICARE

## 2022-02-10 ENCOUNTER — OFFICE VISIT (OUTPATIENT)
Dept: OBGYN CLINIC | Facility: OTHER | Age: 82
End: 2022-02-10

## 2022-02-10 VITALS
HEART RATE: 105 BPM | HEIGHT: 60 IN | BODY MASS INDEX: 15.75 KG/M2 | SYSTOLIC BLOOD PRESSURE: 111 MMHG | DIASTOLIC BLOOD PRESSURE: 72 MMHG | WEIGHT: 80.2 LBS

## 2022-02-10 DIAGNOSIS — S42.294D OTHER CLOSED NONDISPLACED FRACTURE OF PROXIMAL END OF RIGHT HUMERUS WITH ROUTINE HEALING, SUBSEQUENT ENCOUNTER: Primary | ICD-10-CM

## 2022-02-10 DIAGNOSIS — S42.294D OTHER CLOSED NONDISPLACED FRACTURE OF PROXIMAL END OF RIGHT HUMERUS WITH ROUTINE HEALING, SUBSEQUENT ENCOUNTER: ICD-10-CM

## 2022-02-10 PROCEDURE — 99024 POSTOP FOLLOW-UP VISIT: CPT | Performed by: PHYSICIAN ASSISTANT

## 2022-02-10 PROCEDURE — 73030 X-RAY EXAM OF SHOULDER: CPT

## 2022-02-10 NOTE — PROGRESS NOTES
Injury: 11/29/2021 with Right proximal humerus fracture    S: Avtar Hardin denies any pain today  She states she uses the arm without limitation  She is accompanied by a family member or states she does to complain of pain  She lives at home with her   Denies new injury or trauma    /72   Pulse 105   Ht 5' (1 524 m)   Wt 36 4 kg (80 lb 3 2 oz)   BMI 15 66 kg/m²     O: Right shoulder  No TTP  FF: full  Abd: beyond 90   ER: equivalent  IR: LLS  No pain with ROM  NVI  SI to light touch    Radiology: Xrays of the right shoulder were obtained in the office and my interpretation follows:  2 views right shoulder: healed prox humerus fracture  No complication    A/P: 10 weeks s/p Right proximal humerus fracture - healed    Avtar Hardin has FROM without pain  She is not limited in any capacity from her recent fracture  She can resume all activities to tolerance  Follow up as needed    1  Continue with HEP as instructed by therapist  2  WBAT RUE  3  Activities to tolerance  No formal restrictions  4   Follow up: as needed

## 2022-02-22 ENCOUNTER — OFFICE VISIT (OUTPATIENT)
Dept: FAMILY MEDICINE CLINIC | Facility: CLINIC | Age: 82
End: 2022-02-22
Payer: MEDICARE

## 2022-02-22 VITALS
HEART RATE: 83 BPM | BODY MASS INDEX: 15.82 KG/M2 | RESPIRATION RATE: 16 BRPM | DIASTOLIC BLOOD PRESSURE: 66 MMHG | TEMPERATURE: 98.4 F | HEIGHT: 60 IN | OXYGEN SATURATION: 97 % | WEIGHT: 80.6 LBS | SYSTOLIC BLOOD PRESSURE: 118 MMHG

## 2022-02-22 DIAGNOSIS — Z00.00 MEDICARE ANNUAL WELLNESS VISIT, SUBSEQUENT: Primary | ICD-10-CM

## 2022-02-22 DIAGNOSIS — F02.80 LATE ONSET ALZHEIMER'S DEMENTIA WITHOUT BEHAVIORAL DISTURBANCE (HCC): ICD-10-CM

## 2022-02-22 DIAGNOSIS — C49.A2 MALIGNANT GASTROINTESTINAL STROMAL TUMOR (GIST) OF STOMACH (HCC): ICD-10-CM

## 2022-02-22 DIAGNOSIS — G30.1 LATE ONSET ALZHEIMER'S DEMENTIA WITHOUT BEHAVIORAL DISTURBANCE (HCC): ICD-10-CM

## 2022-02-22 DIAGNOSIS — I10 PRIMARY HYPERTENSION: ICD-10-CM

## 2022-02-22 DIAGNOSIS — D70.9 NEUTROPENIA, UNSPECIFIED TYPE (HCC): ICD-10-CM

## 2022-02-22 PROBLEM — S42.201A CLOSED FRACTURE OF RIGHT PROXIMAL HUMERUS: Status: RESOLVED | Noted: 2021-12-06 | Resolved: 2022-02-22

## 2022-02-22 PROCEDURE — 1123F ACP DISCUSS/DSCN MKR DOCD: CPT | Performed by: FAMILY MEDICINE

## 2022-02-22 PROCEDURE — 99214 OFFICE O/P EST MOD 30 MIN: CPT | Performed by: FAMILY MEDICINE

## 2022-02-22 PROCEDURE — G0438 PPPS, INITIAL VISIT: HCPCS | Performed by: FAMILY MEDICINE

## 2022-02-22 NOTE — PATIENT INSTRUCTIONS
Medicare wellness exam is performed  Oncology note reviewed  Agree with conservative treatment  Mostly, want to keep her safe in prevent falls  Medications are reviewed and remain the same  Recheck in 3 months  Medicare Preventive Visit Patient Instructions  Thank you for completing your Welcome to Medicare Visit or Medicare Annual Wellness Visit today  Your next wellness visit will be due in one year (2/23/2023)  The screening/preventive services that you may require over the next 5-10 years are detailed below  Some tests may not apply to you based off risk factors and/or age  Screening tests ordered at today's visit but not completed yet may show as past due  Also, please note that scanned in results may not display below  Preventive Screenings:  Service Recommendations Previous Testing/Comments   Colorectal Cancer Screening  * Colonoscopy    * Fecal Occult Blood Test (FOBT)/Fecal Immunochemical Test (FIT)  * Fecal DNA/Cologuard Test  * Flexible Sigmoidoscopy Age: 54-65 years old   Colonoscopy: every 10 years (may be performed more frequently if at higher risk)  OR  FOBT/FIT: every 1 year  OR  Cologuard: every 3 years  OR  Sigmoidoscopy: every 5 years  Screening may be recommended earlier than age 48 if at higher risk for colorectal cancer  Also, an individualized decision between you and your healthcare provider will decide whether screening between the ages of 74-80 would be appropriate  Colonoscopy: 10/02/2014  FOBT/FIT: Not on file  Cologuard: Not on file  Sigmoidoscopy: Not on file          Breast Cancer Screening Age: 36 years old  Frequency: every 1-2 years  Not required if history of left and right mastectomy Mammogram: 12/01/2014        Cervical Cancer Screening Between the ages of 21-29, pap smear recommended once every 3 years  Between the ages of 33-67, can perform pap smear with HPV co-testing every 5 years     Recommendations may differ for women with a history of total hysterectomy, cervical cancer, or abnormal pap smears in past  Pap Smear: Not on file    Screening Not Indicated   Hepatitis C Screening Once for adults born between 1945 and 1965  More frequently in patients at high risk for Hepatitis C Hep C Antibody: Not on file        Diabetes Screening 1-2 times per year if you're at risk for diabetes or have pre-diabetes Fasting glucose: 87 mg/dL   A1C: No results in last 5 years    Screening Current   Cholesterol Screening Once every 5 years if you don't have a lipid disorder  May order more often based on risk factors  Lipid panel: 04/20/2019    Screening Current     Other Preventive Screenings Covered by Medicare:  1  Abdominal Aortic Aneurysm (AAA) Screening: covered once if your at risk  You're considered to be at risk if you have a family history of AAA  2  Lung Cancer Screening: covers low dose CT scan once per year if you meet all of the following conditions: (1) Age 50-69; (2) No signs or symptoms of lung cancer; (3) Current smoker or have quit smoking within the last 15 years; (4) You have a tobacco smoking history of at least 30 pack years (packs per day multiplied by number of years you smoked); (5) You get a written order from a healthcare provider  3  Glaucoma Screening: covered annually if you're considered high risk: (1) You have diabetes OR (2) Family history of glaucoma OR (3)  aged 48 and older OR (3)  American aged 72 and older  3  Osteoporosis Screening: covered every 2 years if you meet one of the following conditions: (1) You're estrogen deficient and at risk for osteoporosis based off medical history and other findings; (2) Have a vertebral abnormality; (3) On glucocorticoid therapy for more than 3 months; (4) Have primary hyperparathyroidism; (5) On osteoporosis medications and need to assess response to drug therapy     · Last bone density test (DXA Scan): 12/01/2014   5  HIV Screening: covered annually if you're between the age of 12-76  Also covered annually if you are younger than 13 and older than 72 with risk factors for HIV infection  For pregnant patients, it is covered up to 3 times per pregnancy  Immunizations:  Immunization Recommendations   Influenza Vaccine Annual influenza vaccination during flu season is recommended for all persons aged >= 6 months who do not have contraindications   Pneumococcal Vaccine (Prevnar and Pneumovax)  * Prevnar = PCV13  * Pneumovax = PPSV23   Adults 25-60 years old: 1-3 doses may be recommended based on certain risk factors  Adults 72 years old: Prevnar (PCV13) vaccine recommended followed by Pneumovax (PPSV23) vaccine  If already received PPSV23 since turning 65, then PCV13 recommended at least one year after PPSV23 dose  Hepatitis B Vaccine 3 dose series if at intermediate or high risk (ex: diabetes, end stage renal disease, liver disease)   Tetanus (Td) Vaccine - COST NOT COVERED BY MEDICARE PART B Following completion of primary series, a booster dose should be given every 10 years to maintain immunity against tetanus  Td may also be given as tetanus wound prophylaxis  Tdap Vaccine - COST NOT COVERED BY MEDICARE PART B Recommended at least once for all adults  For pregnant patients, recommended with each pregnancy  Shingles Vaccine (Shingrix) - COST NOT COVERED BY MEDICARE PART B  2 shot series recommended in those aged 48 and above     Health Maintenance Due:  There are no preventive care reminders to display for this patient  Immunizations Due:      Topic Date Due    DTaP,Tdap,and Td Vaccines (1 - Tdap) Never done    COVID-19 Vaccine (3 - Booster for [employer] series) 07/07/2021     Advance Directives   What are advance directives? Advance directives are legal documents that state your wishes and plans for medical care  These plans are made ahead of time in case you lose your ability to make decisions for yourself   Advance directives can apply to any medical decision, such as the treatments you want, and if you want to donate organs  What are the types of advance directives? There are many types of advance directives, and each state has rules about how to use them  You may choose a combination of any of the following:  · Living will: This is a written record of the treatment you want  You can also choose which treatments you do not want, which to limit, and which to stop at a certain time  This includes surgery, medicine, IV fluid, and tube feedings  · Durable power of  for healthcare [facility]): This is a written record that states who you want to make healthcare choices for you when you are unable to make them for yourself  This person, called a proxy, is usually a family member or a friend  You may choose more than 1 proxy  · Do not resuscitate (DNR) order:  A DNR order is used in case your heart stops beating or you stop breathing  It is a request not to have certain forms of treatment, such as CPR  A DNR order may be included in other types of advance directives  · Medical directive: This covers the care that you want if you are in a coma, near death, or unable to make decisions for yourself  You can list the treatments you want for each condition  Treatment may include pain medicine, surgery, blood transfusions, dialysis, IV or tube feedings, and a ventilator (breathing machine)  · Values history: This document has questions about your views, beliefs, and how you feel and think about life  This information can help others choose the care that you would choose  Why are advance directives important? An advance directive helps you control your care  Although spoken wishes may be used, it is better to have your wishes written down  Spoken wishes can be misunderstood, or not followed  Treatments may be given even if you do not want them  An advance directive may make it easier for your family to make difficult choices about your care     Underweight  Underweight is defined as having a body mass index (BMI) of less than 18 5 kg/m2   Anorexia  is a loss of appetite, decreased food intake, or both  Your appetite naturally decreases as you get older  You also get full faster than you used to  This occurs because your body needs less energy  Other body changes can also lead to a decreased appetite  Even though some appetite loss is normal, you still need to get enough calories and nutrients to keep you healthy  You can start to lose too much weight if you do not eat as much food as your body needs  Unwanted weight loss can cause health problems, or worsen health problems you already have  You can also become dehydrated if you do not drink enough liquid  How to eat healthy and get enough nutrients:   · Choose healthy foods  Eat a variety of fruits, vegetables, whole grains, low-fat dairy foods, lean meats, and other protein foods  Limit foods high in fat, sugar, and salt  Limit or avoid alcohol as directed  Work with a dietitian to help you plan your meals if you need to follow a special diet  A dietitian can also teach you how to modify foods if you have trouble chewing or swallowing  · Snack on healthy foods between meals  if you only eat a small amount during meals  Snacks provide extra healthy nutrients and calories between meals  Examples include fruit, cheese, and whole grain crackers  · Drink liquids as directed  to avoid dehydration  Drink liquids between meals if they cause you to get full too quickly during meals  Ask how much liquid to drink each day and which liquids are best for you  · Use herbs, spices, and flavor enhancers to add flavor to foods  Avoid using herbs and spice blends that also contain sodium  Ask your healthcare provider or dietitian about flavor enhancers  Flavor enhancers with ham, natural stanton, and roast beef flavors can also be sprinkled on food to add flavor  · Share meals with others as often as you can    Eating with others may help you to eat better during meal time  Ask family members, neighbors, or friends to join you for lunch  There are also senior centers where you can meet people, and share meals with them  · Ask family and friends for help  with shopping or preparing foods  Ask for a ride to the grocery store, if needed  © Copyright [employer] 2018 Information is for End User's use only and may not be sold, redistributed or otherwise used for commercial purposes   All illustrations and images included in CareNotes® are the copyrighted property of [employer]  or [address] [place] [address]

## 2022-02-22 NOTE — PROGRESS NOTES
Chief Complaint   Patient presents with    Medicare Wellness Visit     AWV    Follow-up     Routine F/U Care        HPI   Here for Medicare Wellness exam and follow-up of gastrointestinal stromal tumor, Alzheimer's dementia, hypertension, accompanied by her daughter  Recently seen by Oncology  Noted to have a low white count  Tumor remains adjacent to the stomach similar to 3 months prior  Appetite is fair  Occasional incontinence of stool  Wears Depends  Needs assistance with ambulation  She is unsteady  Someone needs to be with her when she walks  She does have a walker but her  is always with her  Or daughter or son  There have not been any falls  She sleeps in a sofa in the kitchen where there is a TV   sleeps in the living room  If she needs to go to the bathroom during the night, he can help her  She has a bell to ring  Past Medical History:   Diagnosis Date    [facility]'s dementia ([facility] ) 12/21/2021    Closed fracture of right proximal humerus 12/6/2021    GIST (gastrointestinal stromal tumor), malignant ([facility] ) 12/14/2020    High cholesterol     HTN (hypertension) 12/4/2020    Continue labetalol and hydralazine         Past Surgical History:   Procedure Laterality Date    FL INJECTION LEFT HIP (NON ARTHROGRAM)  5/14/2019       Social History     Tobacco Use    Smoking status: Never Smoker    Smokeless tobacco: Never Used   Substance Use Topics    Alcohol use: Yes     Comment: occasional       Social History     Social History Narrative     since 1963  Two children  Two grandchildren  Retired  Was an RN  Worked at [employer] for [employer]   is 80years old and her primary caregiver  Daughter, Jose Carlos Simon, lives nearby          The following portions of the patient's history were reviewed and updated as appropriate: allergies, current medications, past family history, past medical history, past social history, past surgical history and problem list       Review of Systems   Constitutional: Negative for appetite change and unexpected weight change  Respiratory: Negative for shortness of breath  Gastrointestinal: Negative for abdominal pain and anal bleeding  Psychiatric/Behavioral: Negative for dysphoric mood  The patient is not nervous/anxious  /66 (BP Location: Left arm, Patient Position: Sitting, Cuff Size: Standard)   Pulse 83   Temp 98 4 °F (36 9 °C) (Temporal)   Resp 16   Ht 5' (1 524 m)   Wt 36 6 kg (80 lb 9 6 oz)   SpO2 97%   BMI 15 74 kg/m²      Physical Exam     Weight down 6 lb in the last 2 months  She appears comfortable and pleasant  Lungs are clear  Heart regular  Abdomen soft and nontender  Current Outpatient Medications:     ferrous sulfate 324 (65 Fe) mg, Take 1 tablet (324 mg total) by mouth daily before breakfast, Disp: 30 tablet, Rfl: 11    hydrochlorothiazide (HYDRODIURIL) 25 mg tablet, TAKE 0 5 TABLETS (12 5 MG TOTAL) BY MOUTH DAILY AS NEEDED (FOOT SWELLING OR PUFFINESS), Disp: 20 tablet, Rfl: 0    imatinib (GLEEVEC) 400 mg tablet, Take 1 tablet (400 mg total) by mouth daily  , Disp: 30 tablet, Rfl: 10    loratadine (CLARITIN) 10 mg tablet, Take by mouth, Disp: , Rfl:     mirtazapine (REMERON) 7 5 MG tablet, , Disp: , Rfl:     pantoprazole (PROTONIX) 20 mg tablet, TAKE 1 TABLET (20 MG TOTAL) BY MOUTH DAILY TO PREVENT HEARTBURN, Disp: 30 tablet, Rfl: 1    ondansetron (ZOFRAN-ODT) 4 mg disintegrating tablet, TAKE 1 TABLET (4 MG TOTAL) BY MOUTH EVERY 6 (SIX) HOURS AS NEEDED FOR NAUSEA OR VOMITING (Patient not taking: Reported on 2/22/2022), Disp: 60 tablet, Rfl: 1     No problem-specific Assessment & Plan notes found for this encounter       Answers for HPI/ROS submitted by the patient on 2/21/2022  How would you rate your overall health?: fair  Compared to last year, how is your physical health?: slightly worse  In general, how satisfied are you with your life?: satisfied  Compared to last year, how is your eyesight?: same  Compared to last year, how is your hearing?: slightly worse  Compared to last year, how is your emotional/mental health?: same  How often is anger a problem for you?: never, rarely  How often do you feel unusually tired/fatigued?: often  In the past 7 days, how much pain have you experienced?: none  In the past 6 months, have you lost or gained 10 pounds without trying?: Yes  One or more falls in the last year: No  In the past 6 months, have you accidentally leaked urine?: No  Do you have trouble with the stairs inside or outside your home?: Yes  Does your home have working smoke alarms?: Yes  Does your home have a carbon monoxide monitor?: No  Which safety hazards (if any) have you experienced in your home? Please select all that apply : none  How would you describe your current diet?  Please select all that apply : Regular  Additional Comments: I dont get hungry Likes to eat Breakfast, milkshakes daily  In addition to prescription medications, are you taking any over-the-counter supplements?: No  Can you manage your medications?: No  Additional comments : Daughter and  helps  Are you currently taking any opioid medications?: No  Can you walk and transfer into and out of your bed and chair?: Yes  Can you dress and groom yourself?: No  Can you bathe or shower yourself?: No  Can you feed yourself?: Yes  Can you do your laundry/ housekeeping?: No  Can you manage your money, pay your bills, and track your expenses?: No  Can you make your own meals?: No  Can you do your own shopping?: No  Within the last 12 months, have you had any hospitalizations or Emergency Department visits?: Yes  If yes, how many times have you been hospitalized within the past year?: 1-2  Do you have a living will?: Yes  Do you have a Durable POA (Power of ) for healthcare decisions?: Yes  Do you have an Advanced Directive for end of life decisions?: Yes  How often have you used an illegal drug (including marijuana) or a prescription medication for non-medical reasons in the past year?: never  What is the typical number of drinks you consume in a day?: 0  What is the typical number of drinks you consume in a week?: 0  How often did you have a drink containing alcohol in the past year?: never  How many drinks did you have on a typical day  when you were drinking in the past year?: 0  How often did you have 6 or more drinks on one occasion in the past year?: never      Diagnoses and all orders for this visit:    Medicare annual wellness visit, subsequent    Late onset Alzheimer's dementia without behavioral disturbance ([facility] )    Malignant gastrointestinal stromal tumor (GIST) of stomach ([facility] )    Primary hypertension    Neutropenia, unspecified type [facility])        Patient Instructions       Medicare wellness exam is performed  Oncology note reviewed  Agree with conservative treatment  Mostly, want to keep her safe in prevent falls  Medications are reviewed and remain the same  Recheck in 3 months  Medicare Preventive Visit Patient Instructions  Thank you for completing your Welcome to Medicare Visit or Medicare Annual Wellness Visit today  Your next wellness visit will be due in one year (2/23/2023)  The screening/preventive services that you may require over the next 5-10 years are detailed below  Some tests may not apply to you based off risk factors and/or age  Screening tests ordered at today's visit but not completed yet may show as past due  Also, please note that scanned in results may not display below    Preventive Screenings:  Service Recommendations Previous Testing/Comments   Colorectal Cancer Screening  * Colonoscopy    * Fecal Occult Blood Test (FOBT)/Fecal Immunochemical Test (FIT)  * Fecal DNA/Cologuard Test  * Flexible Sigmoidoscopy Age: 54-65 years old   Colonoscopy: every 10 years (may be performed more frequently if at higher risk)  OR  FOBT/FIT: every 1 year  OR  Cologuard: every 3 years  OR  Sigmoidoscopy: every 5 years  Screening may be recommended earlier than age 48 if at higher risk for colorectal cancer  Also, an individualized decision between you and your healthcare provider will decide whether screening between the ages of 74-80 would be appropriate  Colonoscopy: 10/02/2014  FOBT/FIT: Not on file  Cologuard: Not on file  Sigmoidoscopy: Not on file          Breast Cancer Screening Age: 36 years old  Frequency: every 1-2 years  Not required if history of left and right mastectomy Mammogram: 12/01/2014        Cervical Cancer Screening Between the ages of 21-29, pap smear recommended once every 3 years  Between the ages of 33-67, can perform pap smear with HPV co-testing every 5 years  Recommendations may differ for women with a history of total hysterectomy, cervical cancer, or abnormal pap smears in past  Pap Smear: Not on file    Screening Not Indicated   Hepatitis C Screening Once for adults born between 1945 and 1965  More frequently in patients at high risk for Hepatitis C Hep C Antibody: Not on file        Diabetes Screening 1-2 times per year if you're at risk for diabetes or have pre-diabetes Fasting glucose: 87 mg/dL   A1C: No results in last 5 years    Screening Current   Cholesterol Screening Once every 5 years if you don't have a lipid disorder  May order more often based on risk factors  Lipid panel: 04/20/2019    Screening Current     Other Preventive Screenings Covered by Medicare:  1  Abdominal Aortic Aneurysm (AAA) Screening: covered once if your at risk  You're considered to be at risk if you have a family history of AAA    2  Lung Cancer Screening: covers low dose CT scan once per year if you meet all of the following conditions: (1) Age 50-69; (2) No signs or symptoms of lung cancer; (3) Current smoker or have quit smoking within the last 15 years; (4) You have a tobacco smoking history of at least 30 pack years (packs per day multiplied by number of years you smoked); (5) You get a written order from a healthcare provider  3  Glaucoma Screening: covered annually if you're considered high risk: (1) You have diabetes OR (2) Family history of glaucoma OR (3)  aged 48 and older OR (3)  American aged 72 and older  3  Osteoporosis Screening: covered every 2 years if you meet one of the following conditions: (1) You're estrogen deficient and at risk for osteoporosis based off medical history and other findings; (2) Have a vertebral abnormality; (3) On glucocorticoid therapy for more than 3 months; (4) Have primary hyperparathyroidism; (5) On osteoporosis medications and need to assess response to drug therapy  · Last bone density test (DXA Scan): 12/01/2014   5  HIV Screening: covered annually if you're between the age of 15-65  Also covered annually if you are younger than 13 and older than 72 with risk factors for HIV infection  For pregnant patients, it is covered up to 3 times per pregnancy  Immunizations:  Immunization Recommendations   Influenza Vaccine Annual influenza vaccination during flu season is recommended for all persons aged >= 6 months who do not have contraindications   Pneumococcal Vaccine (Prevnar and Pneumovax)  * Prevnar = PCV13  * Pneumovax = PPSV23   Adults 25-60 years old: 1-3 doses may be recommended based on certain risk factors  Adults 72 years old: Prevnar (PCV13) vaccine recommended followed by Pneumovax (PPSV23) vaccine  If already received PPSV23 since turning 65, then PCV13 recommended at least one year after PPSV23 dose  Hepatitis B Vaccine 3 dose series if at intermediate or high risk (ex: diabetes, end stage renal disease, liver disease)   Tetanus (Td) Vaccine - COST NOT COVERED BY MEDICARE PART B Following completion of primary series, a booster dose should be given every 10 years to maintain immunity against tetanus  Td may also be given as tetanus wound prophylaxis     Tdap Vaccine - COST NOT COVERED BY MEDICARE PART B Recommended at least once for all adults  For pregnant patients, recommended with each pregnancy  Shingles Vaccine (Shingrix) - COST NOT COVERED BY MEDICARE PART B  2 shot series recommended in those aged 48 and above     Health Maintenance Due:  There are no preventive care reminders to display for this patient  Immunizations Due:      Topic Date Due    DTaP,Tdap,and Td Vaccines (1 - Tdap) Never done    COVID-19 Vaccine (3 - Booster for [employer] series) 07/07/2021     Advance Directives   What are advance directives? Advance directives are legal documents that state your wishes and plans for medical care  These plans are made ahead of time in case you lose your ability to make decisions for yourself  Advance directives can apply to any medical decision, such as the treatments you want, and if you want to donate organs  What are the types of advance directives? There are many types of advance directives, and each state has rules about how to use them  You may choose a combination of any of the following:  · Living will: This is a written record of the treatment you want  You can also choose which treatments you do not want, which to limit, and which to stop at a certain time  This includes surgery, medicine, IV fluid, and tube feedings  · Durable power of  for healthcare [facility]): This is a written record that states who you want to make healthcare choices for you when you are unable to make them for yourself  This person, called a proxy, is usually a family member or a friend  You may choose more than 1 proxy  · Do not resuscitate (DNR) order:  A DNR order is used in case your heart stops beating or you stop breathing  It is a request not to have certain forms of treatment, such as CPR  A DNR order may be included in other types of advance directives  · Medical directive:   This covers the care that you want if you are in a coma, near death, or unable to make decisions for yourself  You can list the treatments you want for each condition  Treatment may include pain medicine, surgery, blood transfusions, dialysis, IV or tube feedings, and a ventilator (breathing machine)  · Values history: This document has questions about your views, beliefs, and how you feel and think about life  This information can help others choose the care that you would choose  Why are advance directives important? An advance directive helps you control your care  Although spoken wishes may be used, it is better to have your wishes written down  Spoken wishes can be misunderstood, or not followed  Treatments may be given even if you do not want them  An advance directive may make it easier for your family to make difficult choices about your care  Underweight  Underweight is defined as having a body mass index (BMI) of less than 18 5 kg/m2   Anorexia  is a loss of appetite, decreased food intake, or both  Your appetite naturally decreases as you get older  You also get full faster than you used to  This occurs because your body needs less energy  Other body changes can also lead to a decreased appetite  Even though some appetite loss is normal, you still need to get enough calories and nutrients to keep you healthy  You can start to lose too much weight if you do not eat as much food as your body needs  Unwanted weight loss can cause health problems, or worsen health problems you already have  You can also become dehydrated if you do not drink enough liquid  How to eat healthy and get enough nutrients:   · Choose healthy foods  Eat a variety of fruits, vegetables, whole grains, low-fat dairy foods, lean meats, and other protein foods  Limit foods high in fat, sugar, and salt  Limit or avoid alcohol as directed  Work with a dietitian to help you plan your meals if you need to follow a special diet   A dietitian can also teach you how to modify foods if you have trouble chewing or swallowing  · Snack on healthy foods between meals  if you only eat a small amount during meals  Snacks provide extra healthy nutrients and calories between meals  Examples include fruit, cheese, and whole grain crackers  · Drink liquids as directed  to avoid dehydration  Drink liquids between meals if they cause you to get full too quickly during meals  Ask how much liquid to drink each day and which liquids are best for you  · Use herbs, spices, and flavor enhancers to add flavor to foods  Avoid using herbs and spice blends that also contain sodium  Ask your healthcare provider or dietitian about flavor enhancers  Flavor enhancers with ham, natural stanton, and roast beef flavors can also be sprinkled on food to add flavor  · Share meals with others as often as you can  Eating with others may help you to eat better during meal time  Ask family members, neighbors, or friends to join you for lunch  There are also senior centers where you can meet people, and share meals with them  · Ask family and friends for help  with shopping or preparing foods  Ask for a ride to the grocery store, if needed  © Copyright [employer] 2018 Information is for End User's use only and may not be sold, redistributed or otherwise used for commercial purposes   All illustrations and images included in CareNotes® are the copyrighted property of [employer]  or [address] [place] [address] [employer]

## 2022-02-22 NOTE — PROGRESS NOTES
Assessment and Plan:     Problem List Items Addressed This Visit        Musculoskeletal and Integument    Closed fracture of right proximal humerus - Primary           Preventive health issues were discussed with patient, and age appropriate screening tests were ordered as noted in patient's After Visit Summary  Personalized health advice and appropriate referrals for health education or preventive services given if needed, as noted in patient's After Visit Summary       History of Present Illness:     Patient presents for Medicare Annual Wellness visit    Patient Care Team:  Maeve Krueger MD as PCP - General (Family Medicine)  Ludwig Lora MD as Surgeon (Surgical Oncology)  Chai Ching MD (Gastroenterology)     Problem List:     Patient Active Problem List   Diagnosis    Osteoporosis    Lumbar spondylosis    Iron deficiency anemia due to chronic blood loss    HTN (hypertension)    GIST (gastrointestinal stromal tumor), malignant ([address] )    Protein-calorie malnutrition ([address] )    CKD (chronic kidney disease) stage 2, GFR 60-89 ml/min    Closed fracture of right proximal humerus    Alzheimer's dementia [facility] )    Leukopenia      Past Medical and Surgical History:     Past Medical History:   Diagnosis Date    Alzheimer's dementia ([address] ) 12/21/2021    Closed fracture of right proximal humerus 12/6/2021    GIST (gastrointestinal stromal tumor), malignant ([address] ) 12/14/2020    High cholesterol     HTN (hypertension) 12/4/2020    Continue labetalol and hydralazine      Past Surgical History:   Procedure Laterality Date    FL INJECTION LEFT HIP (NON ARTHROGRAM)  5/14/2019      Family History:     Family History   Problem Relation Age of Onset    Colon cancer Mother     Lung cancer Mother     Tuberculosis Father       Social History:     Social History     Socioeconomic History    Marital status: /Civil Union     Spouse name: None    Number of children: None    Years of education: None    Highest education level: None   Occupational History    None   Tobacco Use    Smoking status: Never Smoker    Smokeless tobacco: Never Used   Vaping Use    Vaping Use: Never used   Substance and Sexual Activity    Alcohol use: Yes     Comment: occasional    Drug use: Never    Sexual activity: Not Currently   Other Topics Concern    None   Social History Narrative     since 1963  Two children  Two grandchildren  Retired  Was an RN  Worked at [employer] for [employer]   is 80years old and her primary caregiver  Daughter, Samantha Casarez, lives nearby  [facility]     Financial Resource Strain: Not on file   Food Insecurity: Not on file   Transportation Needs: Not on file   Physical Activity: Not on file   Stress: Not on file   Social Connections: Not on file   Intimate Partner Violence: Not on file   Housing Stability: Not on file      Medications and Allergies:     Current Outpatient Medications   Medication Sig Dispense Refill    ferrous sulfate 324 (65 Fe) mg Take 1 tablet (324 mg total) by mouth daily before breakfast 30 tablet 11    hydrochlorothiazide (HYDRODIURIL) 25 mg tablet TAKE 0 5 TABLETS (12 5 MG TOTAL) BY MOUTH DAILY AS NEEDED (FOOT SWELLING OR PUFFINESS) 20 tablet 0    imatinib (GLEEVEC) 400 mg tablet Take 1 tablet (400 mg total) by mouth daily   30 tablet 10    loratadine (CLARITIN) 10 mg tablet Take by mouth      mirtazapine (REMERON) 7 5 MG tablet       pantoprazole (PROTONIX) 20 mg tablet TAKE 1 TABLET (20 MG TOTAL) BY MOUTH DAILY TO PREVENT HEARTBURN 30 tablet 1    mirtazapine (REMERON) 15 mg tablet Take 1 tablet (15 mg total) by mouth daily at bedtime (Patient not taking: Reported on 2/22/2022 ) 90 tablet 3    ondansetron (ZOFRAN-ODT) 4 mg disintegrating tablet TAKE 1 TABLET (4 MG TOTAL) BY MOUTH EVERY 6 (SIX) HOURS AS NEEDED FOR NAUSEA OR VOMITING (Patient not taking: Reported on 2/22/2022) 60 tablet 1     No current facility-administered medications for this visit  Allergies   Allergen Reactions    Bactrim [Sulfamethoxazole-Trimethoprim]     Simvastatin Myalgia      Immunizations:     Immunization History   Administered Date(s) Administered    COVID-19 PFIZER VACCINE 0 3 ML IM 01/17/2021, 02/07/2021    INFLUENZA 09/27/2013, 11/11/2014, 10/28/2015, 10/05/2016, 10/23/2017, 10/17/2018, 10/11/2019, 10/15/2020, 11/18/2021    Influenza, seasonal, injectable 09/01/2009    Pneumococcal Conjugate 13-Valent 11/05/2019    Pneumococcal Polysaccharide PPV23 10/10/2008      Health Maintenance: There are no preventive care reminders to display for this patient  Topic Date Due    DTaP,Tdap,and Td Vaccines (1 - Tdap) Never done    COVID-19 Vaccine (3 - Booster for [employer] series) 07/07/2021      Medicare Health Risk Assessment:     /66 (BP Location: Left arm, Patient Position: Sitting, Cuff Size: Standard)   Pulse 83   Temp 98 4 °F (36 9 °C) (Temporal)   Resp 16   Ht 5' (1 524 m)   Wt 36 6 kg (80 lb 9 6 oz)   SpO2 97%   BMI 15 74 kg/m²      Tamar Ortiz is here for her Subsequent Wellness visit  Health Risk Assessment:   Patient rates overall health as fair  Patient feels that their physical health rating is slightly worse  Patient is satisfied with their life  Eyesight was rated as same  Hearing was rated as slightly worse  Patient feels that their emotional and mental health rating is same  Patients states they are never, rarely angry  Patient states they are often unusually tired/fatigued  Pain experienced in the last 7 days has been none  Patient states that she has experienced weight loss or gain in last 6 months  Fall Risk Screening: In the past year, patient has experienced: no history of falling in past year      Urinary Incontinence Screening:   Patient has not leaked urine accidently in the last six months  Home Safety:  Patient has trouble with stairs inside or outside of their home   Patient has working smoke alarms and has no working carbon monoxide detector  Home safety hazards include: none  Nutrition:   Current diet is Regular  I don't get hungry Likes to eat Breakfast, milkshakes daily    Medications:   Patient is not currently taking any over-the-counter supplements  Patient is not able to manage medications  Daughter and  helps    Activities of Daily Living (ADLs)/Instrumental Activities of Daily Living (IADLs):   Walk and transfer into and out of bed and chair?: Yes  Dress and groom yourself?: No    Bathe or shower yourself?: No    Feed yourself? Yes  Do your laundry/housekeeping?: No  Manage your money, pay your bills and track your expenses?: No  Make your own meals?: No    Do your own shopping?: No    Previous Hospitalizations:   Any hospitalizations or ED visits within the last 12 months?: Yes    How many hospitalizations have you had in the last year?: 1-2    Advance Care Planning:   Living will: Yes    Durable POA for healthcare: Yes    Advanced directive: Yes      PREVENTIVE SCREENINGS      Cardiovascular Screening:    General: Screening Current      Diabetes Screening:     General: Screening Current      Cervical Cancer Screening:    General: Screening Not Indicated      Osteoporosis Screening:    General: Screening Not Indicated and History Osteoporosis      Lung Cancer Screening:     General: Screening Not Indicated    Screening, Brief Intervention, and Referral to Treatment (SBIRT)    Screening  Typical number of drinks in a day: 0  Typical number of drinks in a week: 0  Interpretation: Low risk drinking behavior      AUDIT-C Screenin) How often did you have a drink containing alcohol in the past year? never  2) How many drinks did you have on a typical day when you were drinking in the past year? 0  3) How often did you have 6 or more drinks on one occasion in the past year? never    AUDIT-C Score: 0  Interpretation: Score 0-2 (female): Negative screen for alcohol misuse    Single Item Drug Screening:  How often have you used an illegal drug (including marijuana) or a prescription medication for non-medical reasons in the past year? never    Single Item Drug Screen Score: 0  Interpretation: Negative screen for possible drug use disorder      Mally Frausto MD

## 2022-03-01 DIAGNOSIS — C49.A2 MALIGNANT GASTROINTESTINAL STROMAL TUMOR (GIST) OF STOMACH (HCC): ICD-10-CM

## 2022-03-01 RX ORDER — PANTOPRAZOLE SODIUM 20 MG/1
20 TABLET, DELAYED RELEASE ORAL DAILY
Qty: 30 TABLET | Refills: 1 | Status: SHIPPED | OUTPATIENT
Start: 2022-03-01 | End: 2022-03-28

## 2022-03-09 ENCOUNTER — APPOINTMENT (OUTPATIENT)
Dept: LAB | Facility: CLINIC | Age: 82
End: 2022-03-09
Payer: MEDICARE

## 2022-03-09 LAB
ALBUMIN SERPL BCP-MCNC: 2.9 G/DL (ref 3.5–5)
ALP SERPL-CCNC: 85 U/L (ref 46–116)
ALT SERPL W P-5'-P-CCNC: 11 U/L (ref 12–78)
ANION GAP SERPL CALCULATED.3IONS-SCNC: 5 MMOL/L (ref 4–13)
AST SERPL W P-5'-P-CCNC: 16 U/L (ref 5–45)
BASOPHILS # BLD AUTO: 0.07 THOUSANDS/ΜL (ref 0–0.1)
BASOPHILS NFR BLD AUTO: 1 % (ref 0–1)
BILIRUB SERPL-MCNC: 0.6 MG/DL (ref 0.2–1)
BUN SERPL-MCNC: 12 MG/DL (ref 5–25)
CALCIUM ALBUM COR SERPL-MCNC: 10.6 MG/DL (ref 8.3–10.1)
CALCIUM SERPL-MCNC: 9.7 MG/DL (ref 8.3–10.1)
CHLORIDE SERPL-SCNC: 100 MMOL/L (ref 100–108)
CO2 SERPL-SCNC: 30 MMOL/L (ref 21–32)
CREAT SERPL-MCNC: 0.56 MG/DL (ref 0.6–1.3)
EOSINOPHIL # BLD AUTO: 0.1 THOUSAND/ΜL (ref 0–0.61)
EOSINOPHIL NFR BLD AUTO: 2 % (ref 0–6)
ERYTHROCYTE [DISTWIDTH] IN BLOOD BY AUTOMATED COUNT: 15.3 % (ref 11.6–15.1)
GFR SERPL CREATININE-BSD FRML MDRD: 87 ML/MIN/1.73SQ M
GLUCOSE SERPL-MCNC: 102 MG/DL (ref 65–140)
HCT VFR BLD AUTO: 36.1 % (ref 34.8–46.1)
HGB BLD-MCNC: 12.2 G/DL (ref 11.5–15.4)
IMM GRANULOCYTES # BLD AUTO: 0.07 THOUSAND/UL (ref 0–0.2)
IMM GRANULOCYTES NFR BLD AUTO: 1 % (ref 0–2)
LYMPHOCYTES # BLD AUTO: 0.34 THOUSANDS/ΜL (ref 0.6–4.47)
LYMPHOCYTES NFR BLD AUTO: 6 % (ref 14–44)
MCH RBC QN AUTO: 35.2 PG (ref 26.8–34.3)
MCHC RBC AUTO-ENTMCNC: 33.8 G/DL (ref 31.4–37.4)
MCV RBC AUTO: 104 FL (ref 82–98)
MONOCYTES # BLD AUTO: 0.66 THOUSAND/ΜL (ref 0.17–1.22)
MONOCYTES NFR BLD AUTO: 11 % (ref 4–12)
NEUTROPHILS # BLD AUTO: 4.71 THOUSANDS/ΜL (ref 1.85–7.62)
NEUTS SEG NFR BLD AUTO: 79 % (ref 43–75)
NRBC BLD AUTO-RTO: 0 /100 WBCS
PLATELET # BLD AUTO: 203 THOUSANDS/UL (ref 149–390)
PMV BLD AUTO: 8.1 FL (ref 8.9–12.7)
POTASSIUM SERPL-SCNC: 4.2 MMOL/L (ref 3.5–5.3)
PROT SERPL-MCNC: 6.9 G/DL (ref 6.4–8.2)
RBC # BLD AUTO: 3.47 MILLION/UL (ref 3.81–5.12)
SODIUM SERPL-SCNC: 135 MMOL/L (ref 136–145)
WBC # BLD AUTO: 5.95 THOUSAND/UL (ref 4.31–10.16)

## 2022-03-09 PROCEDURE — 36415 COLL VENOUS BLD VENIPUNCTURE: CPT | Performed by: NURSE PRACTITIONER

## 2022-03-09 PROCEDURE — 80053 COMPREHEN METABOLIC PANEL: CPT | Performed by: NURSE PRACTITIONER

## 2022-03-09 PROCEDURE — 85025 COMPLETE CBC W/AUTO DIFF WBC: CPT | Performed by: NURSE PRACTITIONER

## 2022-03-14 ENCOUNTER — OFFICE VISIT (OUTPATIENT)
Dept: HEMATOLOGY ONCOLOGY | Facility: CLINIC | Age: 82
End: 2022-03-14
Payer: MEDICARE

## 2022-03-14 VITALS
SYSTOLIC BLOOD PRESSURE: 118 MMHG | DIASTOLIC BLOOD PRESSURE: 72 MMHG | HEIGHT: 60 IN | RESPIRATION RATE: 18 BRPM | OXYGEN SATURATION: 99 % | BODY MASS INDEX: 15.71 KG/M2 | TEMPERATURE: 97.5 F | WEIGHT: 80 LBS | HEART RATE: 98 BPM

## 2022-03-14 DIAGNOSIS — C49.A2 MALIGNANT GASTROINTESTINAL STROMAL TUMOR (GIST) OF STOMACH (HCC): Primary | ICD-10-CM

## 2022-03-14 DIAGNOSIS — E46 PROTEIN-CALORIE MALNUTRITION, UNSPECIFIED SEVERITY (HCC): ICD-10-CM

## 2022-03-14 DIAGNOSIS — D69.6 DECREASED PLATELET COUNT (HCC): ICD-10-CM

## 2022-03-14 DIAGNOSIS — N18.2 CKD (CHRONIC KIDNEY DISEASE) STAGE 2, GFR 60-89 ML/MIN: ICD-10-CM

## 2022-03-14 PROCEDURE — 99213 OFFICE O/P EST LOW 20 MIN: CPT | Performed by: NURSE PRACTITIONER

## 2022-03-14 NOTE — PROGRESS NOTES
[id number] [address] [address] [phone]  Oncology Progress Note  Khushi Grier, 1940, [id number]  3/14/2022        Assessment/Plan:   1  Malignant gastrointestinal stromal tumor (GIST) of stomach ([facility] )  2  Decreased platelet count ([facility] )  3  CKD (chronic kidney disease) stage 2, GFR 60-89 ml/min  4  Protein-calorie malnutrition, unspecified severity ([facility] )   Patient is an 80-year-old female with a history of a gist currently on Gleevec 400 mg p o  once a day  She tolerates this without difficulty  We reviewed her most recent blood counts, WBC previously low at 1 47, currently 5 95 RBC 3 47  platelets 273 mild lymphopenia with relative lymphocytes 6% and absolute 0 34, otherwise stable CBC  Overall she is doing well and able to function with minimal restriction  She does live with her daughter who reports patient has fatigue and decreased appetite however her weight has been stable around 80-83 lb  We discussed continued follow-up with CT scan in 3 months  Her previous scan in December was unchanged with the gist tumor  Patient had follow-up with surgical oncology for discussion of resection however declined surgical intervention  We will plan to see her in 3 months with repeat blood work  Patient and her daughter verbalized understanding and are in agreement with the plan  - Comprehensive metabolic panel; Future  - CBC and differential; Future  - CT chest abdomen pelvis w contrast; Future    Goals and Barriers:    Current Goal:   Prolong Survival from Cancer  Barriers: None  Patient's Capacity to Self Care:  Patient is able to self care      [address]  ______________________________________________________________________________________________________________    Subjective     History of present illness/Cancer History: Oncology History   GIST (gastrointestinal stromal tumor), malignant ([facility] )   2020 Biopsy    2020 patient had a CT scan of the abdomen pelvis  This showed a large gastric mass with air tracking suggesting perforation  She was admitted to the hospital with sepsis syndrome  Repeat CT did not confirm perforation  EGD showed gastric mass  2020 biopsy showed findings consistent with GIST tumor, Ki-67 5%, positive       2020 -  Chemotherapy    Gleevec 400mg PO daily          Lab Results   Component Value Date    WBC 5 95 2022    HGB 12 2 2022    HCT 36 1 2022     (H) 2022     2022     Lab Results   Component Value Date     10/09/2014    SODIUM 135 (L) 2022    K 4 2 2022     2022    CO2 30 2022    ANIONGAP 8 10/09/2014    AGAP 5 2022    BUN 12 2022    CREATININE 0 56 (L) 2022    GLUC 102 2022    GLUF 87 2021    CALCIUM 9 7 2022    AST 16 2022    ALT 11 (L) 2022    ALKPHOS 85 2022    PROT 7 1 10/09/2014    TP 6 9 2022    BILITOT 0 80 10/09/2014    TBILI 0 60 2022    EGFR 87 2022       Interval history:  Clinically stable     ECO - Symptomatic but completely ambulatory    Review of Systems   Constitutional: Negative for activity change, appetite change, fatigue, fever and unexpected weight change  Respiratory: Negative for cough and shortness of breath  Cardiovascular: Negative for chest pain and leg swelling  Gastrointestinal: Negative for abdominal pain, constipation, diarrhea and nausea  Endocrine: Negative for cold intolerance and heat intolerance  Musculoskeletal: Negative for arthralgias and myalgias  Skin: Negative  Neurological: Negative for dizziness, weakness and headaches  Hematological: Negative for adenopathy  Does not bruise/bleed easily         Current Outpatient Medications:     ferrous sulfate 324 (65 Fe) mg, Take 1 tablet (324 mg total) by mouth daily before breakfast, Disp: 30 tablet, Rfl: 11    hydrochlorothiazide (HYDRODIURIL) 25 mg tablet, TAKE 0 5 TABLETS (12 5 MG TOTAL) BY MOUTH DAILY AS NEEDED (FOOT SWELLING OR PUFFINESS), Disp: 20 tablet, Rfl: 0    imatinib (GLEEVEC) 400 mg tablet, Take 1 tablet (400 mg total) by mouth daily  , Disp: 30 tablet, Rfl: 10    loratadine (CLARITIN) 10 mg tablet, Take by mouth, Disp: , Rfl:     mirtazapine (REMERON) 7 5 MG tablet, , Disp: , Rfl:     ondansetron (ZOFRAN-ODT) 4 mg disintegrating tablet, TAKE 1 TABLET (4 MG TOTAL) BY MOUTH EVERY 6 (SIX) HOURS AS NEEDED FOR NAUSEA OR VOMITING, Disp: 60 tablet, Rfl: 1    pantoprazole (PROTONIX) 20 mg tablet, TAKE 1 TABLET (20 MG TOTAL) BY MOUTH DAILY TO PREVENT HEARTBURN, Disp: 30 tablet, Rfl: 1  Allergies   Allergen Reactions    Bactrim [Sulfamethoxazole-Trimethoprim]     Simvastatin Myalgia       Objective   /72 (BP Location: Left arm, Patient Position: Sitting, Cuff Size: Adult)   Pulse 98   Temp 97 5 °F (36 4 °C)   Resp 18   Ht 5' (1 524 m)   Wt 36 3 kg (80 lb)   SpO2 99%   BMI 15 62 kg/m²   Wt Readings from Last 6 Encounters:   03/14/22 36 3 kg (80 lb)   02/22/22 36 6 kg (80 lb 9 6 oz)   02/10/22 36 4 kg (80 lb 3 2 oz)   01/31/22 37 6 kg (83 lb)   01/25/22 38 6 kg (85 lb)   01/03/22 38 kg (83 lb 12 8 oz)       Physical Exam  Vitals reviewed  Constitutional:       Appearance: She is well-developed  Comments: Thin appearing   HENT:      Head: Normocephalic and atraumatic  Eyes:      Conjunctiva/sclera: Conjunctivae normal       Pupils: Pupils are equal, round, and reactive to light  Cardiovascular:      Rate and Rhythm: Normal rate and regular rhythm  Pulses: Normal pulses  Heart sounds: Normal heart sounds  No murmur heard  Pulmonary:      Effort: Pulmonary effort is normal  No respiratory distress  Breath sounds: Normal breath sounds     Abdominal:      General: Bowel sounds are normal       Palpations: Abdomen is soft  Musculoskeletal:         General: Normal range of motion  Cervical back: Normal range of motion and neck supple  Lymphadenopathy:      Cervical: No cervical adenopathy  Skin:     General: Skin is warm and dry  Capillary Refill: Capillary refill takes less than 2 seconds  Neurological:      Mental Status: She is alert and oriented to person, place, and time  Psychiatric:         Behavior: Behavior normal        The following historical data was reviewed:  Past Medical History:   Diagnosis Date    Alzheimer's dementia ([facility] ) 12/21/2021    Closed fracture of right proximal humerus 12/6/2021    GIST (gastrointestinal stromal tumor), malignant ([facility] ) 12/14/2020    High cholesterol     HTN (hypertension) 12/4/2020    Continue labetalol and hydralazine      Past Surgical History:   Procedure Laterality Date    FL INJECTION LEFT HIP (NON ARTHROGRAM)  5/14/2019     Social History     Socioeconomic History    Marital status: /Civil Union     Spouse name: None    Number of children: None    Years of education: None    Highest education level: None   Occupational History    None   Tobacco Use    Smoking status: Never Smoker    Smokeless tobacco: Never Used   Vaping Use    Vaping Use: Never used   Substance and Sexual Activity    Alcohol use: Yes     Comment: occasional    Drug use: Never    Sexual activity: Not Currently   Other Topics Concern    None   Social History Narrative     since 1963  Two children  Two grandchildren  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 [employer]   is 80years old and her primary caregiver  Daughter, Nano Lynn, lives nearby       Social Determinants of Health     Financial Resource Strain: Not on file   Food Insecurity: Not on file   Transportation Needs: Not on file   Physical Activity: Not on file   Stress: Not on file   Social Connections: Not on file   Intimate Partner Violence: Not on file   Housing Stability: Not on file     Family History   Problem Relation Age of Onset    Colon cancer Mother     Lung cancer Mother     Tuberculosis Father        Please note: This report has been generated by a voice recognition software system  Therefore there may be syntax, spelling, and/or grammatical errors  Please call if you have any questions

## 2022-03-28 DIAGNOSIS — C49.A2 MALIGNANT GASTROINTESTINAL STROMAL TUMOR (GIST) OF STOMACH (HCC): ICD-10-CM

## 2022-03-28 RX ORDER — PANTOPRAZOLE SODIUM 20 MG/1
20 TABLET, DELAYED RELEASE ORAL DAILY
Qty: 30 TABLET | Refills: 1 | Status: SHIPPED | OUTPATIENT
Start: 2022-03-28 | End: 2022-04-21

## 2022-04-20 ENCOUNTER — TELEPHONE (OUTPATIENT)
Dept: FAMILY MEDICINE CLINIC | Facility: CLINIC | Age: 82
End: 2022-04-20

## 2022-04-20 DIAGNOSIS — G30.1 LATE ONSET ALZHEIMER'S DEMENTIA WITHOUT BEHAVIORAL DISTURBANCE (HCC): Primary | ICD-10-CM

## 2022-04-20 DIAGNOSIS — F02.80 LATE ONSET ALZHEIMER'S DEMENTIA WITHOUT BEHAVIORAL DISTURBANCE (HCC): Primary | ICD-10-CM

## 2022-04-20 NOTE — TELEPHONE ENCOUNTER
Daughter called and stated that the patients medication needs to be refilled mirtazapine (REMERON) 15 mg tablet  She stated that it has been cancelled and did not know why since she wasn't aware of stopping medication  Please send to pharmacy if she is to continue on this medication

## 2022-04-21 DIAGNOSIS — C49.A2 MALIGNANT GASTROINTESTINAL STROMAL TUMOR (GIST) OF STOMACH (HCC): ICD-10-CM

## 2022-04-21 RX ORDER — MIRTAZAPINE 7.5 MG/1
7.5 TABLET, FILM COATED ORAL
Qty: 90 TABLET | Refills: 3 | Status: SHIPPED | OUTPATIENT
Start: 2022-04-21

## 2022-04-21 RX ORDER — PANTOPRAZOLE SODIUM 20 MG/1
20 TABLET, DELAYED RELEASE ORAL DAILY
Qty: 30 TABLET | Refills: 1 | Status: SHIPPED | OUTPATIENT
Start: 2022-04-21 | End: 2022-05-14

## 2022-05-14 DIAGNOSIS — C49.A2 MALIGNANT GASTROINTESTINAL STROMAL TUMOR (GIST) OF STOMACH (HCC): ICD-10-CM

## 2022-05-14 RX ORDER — PANTOPRAZOLE SODIUM 20 MG/1
20 TABLET, DELAYED RELEASE ORAL DAILY
Qty: 30 TABLET | Refills: 1 | Status: SHIPPED | OUTPATIENT
Start: 2022-05-14 | End: 2022-06-14

## 2022-05-24 ENCOUNTER — APPOINTMENT (OUTPATIENT)
Dept: LAB | Facility: CLINIC | Age: 82
End: 2022-05-24
Payer: MEDICARE

## 2022-05-24 ENCOUNTER — OFFICE VISIT (OUTPATIENT)
Dept: FAMILY MEDICINE CLINIC | Facility: CLINIC | Age: 82
End: 2022-05-24
Payer: MEDICARE

## 2022-05-24 VITALS
HEIGHT: 60 IN | WEIGHT: 86.2 LBS | SYSTOLIC BLOOD PRESSURE: 124 MMHG | TEMPERATURE: 98.4 F | RESPIRATION RATE: 16 BRPM | DIASTOLIC BLOOD PRESSURE: 62 MMHG | BODY MASS INDEX: 16.92 KG/M2 | OXYGEN SATURATION: 98 % | HEART RATE: 107 BPM

## 2022-05-24 DIAGNOSIS — N18.2 CKD (CHRONIC KIDNEY DISEASE) STAGE 2, GFR 60-89 ML/MIN: ICD-10-CM

## 2022-05-24 DIAGNOSIS — D50.0 IRON DEFICIENCY ANEMIA DUE TO CHRONIC BLOOD LOSS: ICD-10-CM

## 2022-05-24 DIAGNOSIS — S42.294A OTHER CLOSED NONDISPLACED FRACTURE OF PROXIMAL END OF RIGHT HUMERUS, INITIAL ENCOUNTER: Primary | ICD-10-CM

## 2022-05-24 DIAGNOSIS — C49.A2 MALIGNANT GASTROINTESTINAL STROMAL TUMOR (GIST) OF STOMACH (HCC): ICD-10-CM

## 2022-05-24 DIAGNOSIS — I10 PRIMARY HYPERTENSION: ICD-10-CM

## 2022-05-24 DIAGNOSIS — G30.1 LATE ONSET ALZHEIMER'S DEMENTIA WITHOUT BEHAVIORAL DISTURBANCE (HCC): ICD-10-CM

## 2022-05-24 DIAGNOSIS — F02.80 LATE ONSET ALZHEIMER'S DEMENTIA WITHOUT BEHAVIORAL DISTURBANCE (HCC): ICD-10-CM

## 2022-05-24 PROCEDURE — 99214 OFFICE O/P EST MOD 30 MIN: CPT | Performed by: FAMILY MEDICINE

## 2022-05-24 RX ORDER — FERROUS SULFATE TAB EC 324 MG (65 MG FE EQUIVALENT) 324 (65 FE) MG
TABLET DELAYED RESPONSE ORAL
Qty: 30 TABLET | Refills: 11
Start: 2022-05-24

## 2022-05-24 NOTE — PATIENT INSTRUCTIONS
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

## 2022-05-24 NOTE — PROGRESS NOTES
Chief Complaint   Patient presents with    Follow-up     3M F/U: HTN, Alzheimer, Osteoporosis, CKD           HPI   Here for  follow-up of gastrointestinal stromal tumor, Alzheimer's dementia, hypertension, accompanied by her daughter  Daughter notes that her affect is more flat and she gets easily fatigued  Not walking as much as she used to  Also feels that the memories getting somewhat worse  Patient denies any discomfort  Appetite is fair  Usually eats breakfast well  Currently not on any blood pressure medication  Mirtazapine helps for sleep at bedtime  Continues on pantoprazole for stomach acid  Past Medical History:   Diagnosis Date    Alzheimer's dementia ([facility] ) 12/21/2021    Closed fracture of right proximal humerus 12/06/2021    GIST (gastrointestinal stromal tumor), malignant ([facility] ) 12/14/2020    High cholesterol     HTN (hypertension) 12/04/2020        Past Surgical History:   Procedure Laterality Date    FL INJECTION LEFT HIP (NON ARTHROGRAM)  5/14/2019       Social History     Tobacco Use    Smoking status: Never Smoker    Smokeless tobacco: Never Used   Substance Use Topics    Alcohol use: Yes     Comment: occasional       Social History     Social History Narrative     since 1963  Two children  Two grandchildren  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 [employer]   is 80years old and her primary caregiver  Daughter, Scarlet Mendez, lives nearby          The following portions of the patient's history were reviewed and updated as appropriate: allergies, current medications, past family history, past medical history, past social history, past surgical history and problem list       Review of Systems       /62 (BP Location: Right arm, Patient Position: Sitting, Cuff Size: Standard)   Pulse (!) 107   Temp 98 4 °F (36 9 °C) (Temporal)   Resp 16   Ht 5' (1 524 m)   Wt 39 1 kg (86 lb 3 2 oz)   SpO2 98%   BMI 16 83 kg/m²      Physical Exam Appears comfortable  1 cm scabbed lesion present on the nose, probably a basal cell  Small raised lesion just left of that  Lungs are clear  Heart regular  Abdomen soft and nontender  No leg edema  Current Outpatient Medications:     ferrous sulfate 324 (65 Fe) mg, One tablet 3 times a week, Disp: 30 tablet, Rfl: 11    imatinib (GLEEVEC) 400 mg tablet, Take 1 tablet (400 mg total) by mouth daily  , Disp: 30 tablet, Rfl: 10    loratadine (CLARITIN) 10 mg tablet, Take by mouth, Disp: , Rfl:     mirtazapine (REMERON) 7 5 MG tablet, Take 1 tablet (7 5 mg total) by mouth daily at bedtime, Disp: 90 tablet, Rfl: 3    pantoprazole (PROTONIX) 20 mg tablet, TAKE 1 TABLET (20 MG TOTAL) BY MOUTH DAILY TO PREVENT HEARTBURN, Disp: 30 tablet, Rfl: 1    ondansetron (ZOFRAN-ODT) 4 mg disintegrating tablet, TAKE 1 TABLET (4 MG TOTAL) BY MOUTH EVERY 6 (SIX) HOURS AS NEEDED FOR NAUSEA OR VOMITING (Patient not taking: Reported on 5/24/2022), Disp: 60 tablet, Rfl: 1     No problem-specific Assessment & Plan notes found for this encounter  Diagnoses and all orders for this visit:    Other closed nondisplaced fracture of proximal end of right humerus, initial encounter    Iron deficiency anemia due to chronic blood loss  -     ferrous sulfate 324 (65 Fe) mg; One tablet 3 times a week    Late onset Alzheimer's dementia without behavioral disturbance ([facility] )    Malignant gastrointestinal stromal tumor (GIST) of stomach ([facility] )    Primary hypertension        Patient Instructions   Slow progression of Alzheimer's dementia  No longer needing blood pressure medication  Likely 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

## 2022-05-25 ENCOUNTER — APPOINTMENT (OUTPATIENT)
Dept: LAB | Facility: CLINIC | Age: 82
End: 2022-05-25
Payer: MEDICARE

## 2022-05-25 DIAGNOSIS — K31.89 MASS OF STOMACH: ICD-10-CM

## 2022-05-25 DIAGNOSIS — C49.A2 MALIGNANT GASTROINTESTINAL STROMAL TUMOR (GIST) OF STOMACH (HCC): ICD-10-CM

## 2022-05-25 LAB
ALBUMIN SERPL BCP-MCNC: 2.6 G/DL (ref 3.5–5)
ALP SERPL-CCNC: 75 U/L (ref 46–116)
ALT SERPL W P-5'-P-CCNC: 12 U/L (ref 12–78)
ANION GAP SERPL CALCULATED.3IONS-SCNC: 7 MMOL/L (ref 4–13)
AST SERPL W P-5'-P-CCNC: 30 U/L (ref 5–45)
BASOPHILS # BLD AUTO: 0.04 THOUSANDS/ΜL (ref 0–0.1)
BASOPHILS NFR BLD AUTO: 1 % (ref 0–1)
BILIRUB SERPL-MCNC: 0.57 MG/DL (ref 0.2–1)
BUN SERPL-MCNC: 12 MG/DL (ref 5–25)
CALCIUM ALBUM COR SERPL-MCNC: 9.7 MG/DL (ref 8.3–10.1)
CALCIUM SERPL-MCNC: 8.6 MG/DL (ref 8.3–10.1)
CHLORIDE SERPL-SCNC: 102 MMOL/L (ref 100–108)
CO2 SERPL-SCNC: 27 MMOL/L (ref 21–32)
CREAT SERPL-MCNC: 0.58 MG/DL (ref 0.6–1.3)
EOSINOPHIL # BLD AUTO: 0.05 THOUSAND/ΜL (ref 0–0.61)
EOSINOPHIL NFR BLD AUTO: 1 % (ref 0–6)
ERYTHROCYTE [DISTWIDTH] IN BLOOD BY AUTOMATED COUNT: 15.9 % (ref 11.6–15.1)
GFR SERPL CREATININE-BSD FRML MDRD: 86 ML/MIN/1.73SQ M
GLUCOSE SERPL-MCNC: 100 MG/DL (ref 65–140)
HCT VFR BLD AUTO: 37.9 % (ref 34.8–46.1)
HGB BLD-MCNC: 12.7 G/DL (ref 11.5–15.4)
IMM GRANULOCYTES # BLD AUTO: 0.02 THOUSAND/UL (ref 0–0.2)
IMM GRANULOCYTES NFR BLD AUTO: 0 % (ref 0–2)
LYMPHOCYTES # BLD AUTO: 0.41 THOUSANDS/ΜL (ref 0.6–4.47)
LYMPHOCYTES NFR BLD AUTO: 8 % (ref 14–44)
MCH RBC QN AUTO: 34.1 PG (ref 26.8–34.3)
MCHC RBC AUTO-ENTMCNC: 33.5 G/DL (ref 31.4–37.4)
MCV RBC AUTO: 102 FL (ref 82–98)
MONOCYTES # BLD AUTO: 0.68 THOUSAND/ΜL (ref 0.17–1.22)
MONOCYTES NFR BLD AUTO: 14 % (ref 4–12)
NEUTROPHILS # BLD AUTO: 3.8 THOUSANDS/ΜL (ref 1.85–7.62)
NEUTS SEG NFR BLD AUTO: 76 % (ref 43–75)
NRBC BLD AUTO-RTO: 0 /100 WBCS
PLATELET # BLD AUTO: 208 THOUSANDS/UL (ref 149–390)
PMV BLD AUTO: 8.5 FL (ref 8.9–12.7)
POTASSIUM SERPL-SCNC: 3.9 MMOL/L (ref 3.5–5.3)
PROT SERPL-MCNC: 6.4 G/DL (ref 6.4–8.2)
RBC # BLD AUTO: 3.72 MILLION/UL (ref 3.81–5.12)
SODIUM SERPL-SCNC: 136 MMOL/L (ref 136–145)
WBC # BLD AUTO: 5 THOUSAND/UL (ref 4.31–10.16)

## 2022-05-25 PROCEDURE — 80053 COMPREHEN METABOLIC PANEL: CPT

## 2022-05-25 PROCEDURE — 85025 COMPLETE CBC W/AUTO DIFF WBC: CPT

## 2022-05-25 RX ORDER — IMATINIB MESYLATE 400 MG/1
400 TABLET, FILM COATED ORAL DAILY
Qty: 30 TABLET | Refills: 10 | Status: SHIPPED | OUTPATIENT
Start: 2022-05-25

## 2022-06-04 ENCOUNTER — HOSPITAL ENCOUNTER (OUTPATIENT)
Dept: CT IMAGING | Facility: HOSPITAL | Age: 82
Discharge: HOME | End: 2022-06-04
Payer: MEDICARE

## 2022-06-04 DIAGNOSIS — C49.A2 MALIGNANT GASTROINTESTINAL STROMAL TUMOR (GIST) OF STOMACH (HCC): ICD-10-CM

## 2022-06-04 PROCEDURE — G1004 CDSM NDSC: HCPCS

## 2022-06-04 PROCEDURE — 74176 CT ABD & PELVIS W/O CONTRAST: CPT

## 2022-06-04 PROCEDURE — 71250 CT THORAX DX C-: CPT

## 2022-06-08 ENCOUNTER — DOCUMENTATION (OUTPATIENT)
Dept: HEMATOLOGY ONCOLOGY | Facility: CLINIC | Age: 82
End: 2022-06-08

## 2022-06-08 NOTE — PROGRESS NOTES
Read requested for: CT chest abdomen pelvis wo contrast (Order: [id number]) - 6/4/2022      Spoke to [facility]

## 2022-06-09 ENCOUNTER — OFFICE VISIT (OUTPATIENT)
Dept: HEMATOLOGY ONCOLOGY | Facility: CLINIC | Age: 82
End: 2022-06-09
Payer: MEDICARE

## 2022-06-09 VITALS
RESPIRATION RATE: 16 BRPM | WEIGHT: 85.5 LBS | SYSTOLIC BLOOD PRESSURE: 100 MMHG | DIASTOLIC BLOOD PRESSURE: 64 MMHG | HEART RATE: 105 BPM | OXYGEN SATURATION: 95 % | TEMPERATURE: 98 F | HEIGHT: 60 IN | BODY MASS INDEX: 16.78 KG/M2

## 2022-06-09 DIAGNOSIS — D50.0 IRON DEFICIENCY ANEMIA DUE TO CHRONIC BLOOD LOSS: ICD-10-CM

## 2022-06-09 DIAGNOSIS — C49.A2 MALIGNANT GASTROINTESTINAL STROMAL TUMOR (GIST) OF STOMACH (HCC): Primary | ICD-10-CM

## 2022-06-09 PROCEDURE — 99214 OFFICE O/P EST MOD 30 MIN: CPT | Performed by: NURSE PRACTITIONER

## 2022-06-09 NOTE — PROGRESS NOTES
[address] [facility] [address] [address] [phone]  Oncology Progress Note  Baron Interiano, 1940, [id number]  6/9/2022        Assessment/Plan:   1  Malignant gastrointestinal stromal tumor (GIST) of stomach ([address] )  2  Iron deficiency anemia due to chronic blood loss   Patient is an 80-year-old female with a history of GIST tumor of the stomach currently on Gleevec 400 mg p o  once daily  She tolerates this without difficulty  She was previously seen by surgical oncology however declined surgical intervention secondary to age  CT scan of the chest abdomen and pelvis from 06/04/2022 appears to have a slightly decreased size from study in December 2021  Stable hypodense hepatic lesion, remaining hepatic lesions difficult to assess in the absence of IV contrast   There is stable right upper lobe pulmonary nodules and a new 3 mm right upper lobe pulmonary nodule  There is a slight increase in the small left pleural effusion and new small right pleural effusion  Most recent CBC reveals a normal white blood cell count hemoglobin 12 7, , platelets 391, otherwise stable differential   Metabolic panel reveals a creatinine of 0 58, albumin 2 6 otherwise normal    Patient's daughter Kristen Akers reports her mother has had several bouts of diarrhea over the past few weeks  Patient states she has abdominal pain prior to having diarrhea  She is unable to attributed to anything specific  She does not believe this is from the [address]  We discussed the use of Imodium as needed  I recommended trying 1 tablet at a time secondary to patient's weight  Otherwise Kristen Akers states her mother's appetite and weight are about the same  We discussed smaller bite sized meals rather than having a large meal at a time  They are willing to try smaller meals  We will plan to see her in 3 months with repeat blood work    Patient and her daughter verbalized understanding and are in agreement with plan  - CBC and differential; Future  - Comprehensive metabolic panel; Future  - Iron Panel (Includes Ferritin, Iron Sat%, Iron, and TIBC); Future    Goals and Barriers:    Current Goal:   Prolong Survival from Cancer  Barriers: None  Patient's Capacity to Self Care:  Patient is able to self care with assistance  [address]   ______________________________________________________________________________________________________________    Subjective     History of present illness/Cancer History:   Oncology History   GIST (gastrointestinal stromal tumor), malignant [facility])   2020 Biopsy    2020 patient had a CT scan of the abdomen pelvis  This showed a large gastric mass with air tracking suggesting perforation  She was admitted to the hospital with sepsis syndrome  Repeat CT did not confirm perforation  EGD showed gastric mass    2020 biopsy showed findings consistent with GIST tumor, Ki-67 5%, positive       2020 -  Chemotherapy    Gleevec 400mg PO daily          Lab Results   Component Value Date    WBC 5 00 2022    HGB 12 7 2022    HCT 37 9 2022     (H) 2022     2022     Lab Results   Component Value Date     10/09/2014    SODIUM 136 2022    K 3 9 2022     2022    CO2 27 2022    ANIONGAP 8 10/09/2014    AGAP 7 2022    BUN 12 2022    CREATININE 0 58 (L) 2022    GLUC 100 2022    GLUF 87 2021    CALCIUM 8 6 2022    AST 30 2022    ALT 12 2022    ALKPHOS 75 2022    PROT 7 1 10/09/2014    TP 6 4 2022    BILITOT 0 80 10/09/2014    TBILI 0 57 2022    EGFR 86 2022       Interval history:  Clinically stable     ECO - Symptomatic but completely ambulatory    Review of Systems   Constitutional: Positive for fatigue  Negative for activity change, appetite change, chills, fever and unexpected weight change  HENT: Negative for ear pain and sore throat  Eyes: Negative for pain and visual disturbance  Respiratory: Negative for cough and shortness of breath  Cardiovascular: Negative for chest pain, palpitations and leg swelling  Gastrointestinal: Positive for diarrhea  Negative for abdominal pain, constipation, nausea and vomiting  Endocrine: Negative for cold intolerance and heat intolerance  Genitourinary: Negative for dysuria and hematuria  Musculoskeletal: Negative for arthralgias, back pain and myalgias  Skin: Negative  Negative for color change and rash  Neurological: Negative for dizziness, seizures, syncope, weakness and headaches  Hematological: Negative for adenopathy  Does not bruise/bleed easily  All other systems reviewed and are negative  Current Outpatient Medications:     ferrous sulfate 324 (65 Fe) mg, One tablet 3 times a week, Disp: 30 tablet, Rfl: 11    imatinib (GLEEVEC) 400 mg tablet, Take 1 tablet (400 mg total) by mouth daily  , Disp: 30 tablet, Rfl: 10    loratadine (CLARITIN) 10 mg tablet, Take by mouth, Disp: , Rfl:     mirtazapine (REMERON) 7 5 MG tablet, Take 1 tablet (7 5 mg total) by mouth daily at bedtime, Disp: 90 tablet, Rfl: 3    pantoprazole (PROTONIX) 20 mg tablet, TAKE 1 TABLET (20 MG TOTAL) BY MOUTH DAILY TO PREVENT HEARTBURN, Disp: 30 tablet, Rfl: 1    ondansetron (ZOFRAN-ODT) 4 mg disintegrating tablet, TAKE 1 TABLET (4 MG TOTAL) BY MOUTH EVERY 6 (SIX) HOURS AS NEEDED FOR NAUSEA OR VOMITING (Patient not taking: No sig reported), Disp: 60 tablet, Rfl: 1  Allergies   Allergen Reactions    Bactrim [Sulfamethoxazole-Trimethoprim]     Simvastatin Myalgia     Objective   /64 (BP Location: Left arm, Patient Position: Sitting, Cuff Size: Adult)   Pulse 105   Temp 98 °F (36 7 °C) (Tympanic)   Resp 16   Ht 5' (1 524 m)   Wt 38 8 kg (85 lb 8 oz) SpO2 95%   BMI 16 70 kg/m²   Wt Readings from Last 6 Encounters:   06/09/22 38 8 kg (85 lb 8 oz)   05/24/22 39 1 kg (86 lb 3 2 oz)   03/14/22 36 3 kg (80 lb)   02/22/22 36 6 kg (80 lb 9 6 oz)   02/10/22 36 4 kg (80 lb 3 2 oz)   01/31/22 37 6 kg (83 lb)       Physical Exam  Constitutional:       Appearance: She is well-developed  Comments: Frail appearing   HENT:      Head: Normocephalic and atraumatic  Eyes:      Pupils: Pupils are equal, round, and reactive to light  Pulmonary:      Effort: Pulmonary effort is normal  No respiratory distress  Musculoskeletal:         General: Normal range of motion  Cervical back: Normal range of motion  Lymphadenopathy:      Cervical: No cervical adenopathy  Skin:     General: Skin is dry  Neurological:      Mental Status: She is alert and oriented to person, place, and time  Psychiatric:         Behavior: Behavior normal        Imaging Review:  CT chest abdomen pelvis wo contrast    Result Date: 6/9/2022  Narrative CT CHEST, ABDOMEN AND PELVIS WITHOUT IV CONTRAST INDICATION:   C49  A2: Gastrointestinal stromal tumor of stomach  COMPARISON:  12/23/2021 TECHNIQUE: CT examination of the chest, abdomen and pelvis was performed without intravenous contrast  This examination was performed without intravenous contrast in the context of the critical nationwide Omnipaque shortage  Axial, sagittal, and coronal 2D reformatted images were created from the source data and submitted for interpretation  Radiation dose length product (DLP) for this visit:  423 mGy-cm   This examination, like all CT scans performed in the [facility], was performed utilizing techniques to minimize radiation dose exposure, including the use of iterative reconstruction and automated exposure control  Enteric contrast was administered   FINDINGS: CHEST LUNGS:  Pulmonary nodules will be described on series 3   3 mm right upper lobe pulmonary nodules noted image 33 not clearly identified previously  Stable 2 mm right upper lobe pulmonary nodule is noted since image 50  Dependent left lower lobe atelectasis is present  There is no tracheal or endobronchial lesion  PLEURA:  New small right pleural effusion is present  Interval increase in size of small left pleural effusion is noted  HEART/GREAT VESSELS: Small pericardial effusion is again seen  No thoracic aortic aneurysm  MEDIASTINUM AND DEVAN:  Unremarkable  CHEST WALL AND LOWER NECK:  Unremarkable  ABDOMEN LIVER/BILIARY TREE:  Evaluation of the hepatic parenchyma is limited by absence of intravenous contrast   Stable 8 mm medial right hepatic lobe lesion is noted series 2 image 50  GALLBLADDER:  Gallbladder is surgically absent  SPLEEN:  Unremarkable  PANCREAS:  Unremarkable  ADRENAL GLANDS:  Unremarkable  KIDNEYS/URETERS:  Unremarkable  No hydronephrosis  STOMACH AND BOWEL:  Although evaluation is extremely limited secondary to absence of intravenous contrast, the 6 9 x 4 9 cm mass lesion in association with the gastric fundus appears slightly decreased in size from prior exam  APPENDIX:  No findings to suggest appendicitis  ABDOMINOPELVIC CAVITY:  Small volume of ascites is present  No pneumoperitoneum  No lymphadenopathy  VESSELS:  Unremarkable for patient's age  PELVIS REPRODUCTIVE ORGANS:  Unremarkable for patient's age  URINARY BLADDER:  Unremarkable  ABDOMINAL WALL/INGUINAL REGIONS:  Unremarkable  OSSEOUS STRUCTURES:  No acute fracture or destructive osseous lesion  Impression Although evaluation of patient's known GIST tumor is challenging secondary to absence of intravenous contrast, it appears slightly decreased in size from study of 12/23/2021  Stable hypodense hepatic lesion  Remaining hepatic lesions difficult to assess in the absence of intravenous contrast  Stable right upper lobe pulmonary nodule and new 3 mm right upper lobe pulmonary nodule   Slight increase in small left pleural effusion and new small right pleural effusion  The following historical data was reviewed:  Past Medical History:   Diagnosis Date    [facility] ) 12/21/2021    Closed fracture of right proximal humerus 12/06/2021    [facility]IST [facility]gastrointestinal stromal [facility] ) 12/14/2020    High cholesterol     HTN (hypertension) 12/04/2020     Past Surgical History:   Procedure Laterality Date    FL INJECTION LEFT HIP (NON ARTHROGRAM)  5/14/2019     Please note: This report has been generated by a voice recognition software system  Therefore there may be syntax, spelling, and/or grammatical errors  Please call if you have any questions

## 2022-06-14 DIAGNOSIS — C49.A2 MALIGNANT GASTROINTESTINAL STROMAL TUMOR (GIST) OF STOMACH (HCC): ICD-10-CM

## 2022-06-14 RX ORDER — PANTOPRAZOLE SODIUM 20 MG/1
20 TABLET, DELAYED RELEASE ORAL DAILY
Qty: 90 TABLET | Refills: 1 | Status: SHIPPED | OUTPATIENT
Start: 2022-06-14

## 2022-07-26 ENCOUNTER — OFFICE VISIT (OUTPATIENT)
Dept: FAMILY MEDICINE CLINIC | Facility: CLINIC | Age: 82
End: 2022-07-26
Payer: MEDICARE

## 2022-07-26 VITALS
TEMPERATURE: 99.1 F | RESPIRATION RATE: 16 BRPM | WEIGHT: 88 LBS | DIASTOLIC BLOOD PRESSURE: 56 MMHG | HEIGHT: 60 IN | SYSTOLIC BLOOD PRESSURE: 102 MMHG | BODY MASS INDEX: 17.28 KG/M2 | OXYGEN SATURATION: 96 % | HEART RATE: 102 BPM

## 2022-07-26 DIAGNOSIS — K52.9 CHRONIC DIARRHEA: Primary | ICD-10-CM

## 2022-07-26 PROCEDURE — 99214 OFFICE O/P EST MOD 30 MIN: CPT | Performed by: FAMILY MEDICINE

## 2022-07-26 RX ORDER — DIPHENOXYLATE HYDROCHLORIDE AND ATROPINE SULFATE 2.5; .025 MG/1; MG/1
1 TABLET ORAL 4 TIMES DAILY PRN
Qty: 60 TABLET | Refills: 0 | Status: SHIPPED | OUTPATIENT
Start: 2022-07-26 | End: 2022-10-25 | Stop reason: SDUPTHER

## 2022-07-26 NOTE — PROGRESS NOTES
Chief Complaint   Patient presents with    Ongoing diarrhea     X's 2M; Unable to hold BM's, pt reports up to 2-3 loose BM's daily  HPI   Here for  follow-up of gastrointestinal stromal tumor, Alzheimer's dementia, hypertension, accompanied by her daughter  The last 2 months, has had ongoing loose stools  Always diarrhea  Not formed  Incontinent of it  Occurs 2 or 3 times a day  Unable to make it to the bathroom in time  Also has a sore on the inside of her butt  Using Imodium 3 times in the last month  Denies abdominal discomfort  Appetite is okay  Has not tried Metamucil  Wearing Depends  No recent use of antibiotics  Past Medical History:   Diagnosis Date    [facility] ) 12/21/2021    Closed fracture of right proximal humerus 12/06/2021    GIST [facility]gastrointestinal stromal tumor[facility] ([facility] ) 12/14/2020    High cholesterol     HTN (hypertension) 12/04/2020        Past Surgical History:   Procedure Laterality Date    FL INJECTION LEFT HIP (NON ARTHROGRAM)  5/14/2019       Social History     Tobacco Use    Smoking status: Never Smoker    Smokeless tobacco: Never Used   Substance Use Topics    Alcohol use: Yes     Comment: occasional       Social History     Social History Narrative     since 1963  Two children  Two grandchildren  Retired  Was an RN  Worked at [employer] for [employer]   is 80years old and her primary caregiver  Daughter, Samreen Nunez, lives nearby          The following portions of the patient's history were reviewed and updated as appropriate: allergies, current medications, past family history, past medical history, past social history, past surgical history and problem list       Review of Systems       /56 (BP Location: Left arm, Patient Position: Sitting, Cuff Size: Standard)   Pulse 102   Temp 99 1 °F (37 3 °C) (Temporal)   Resp 16   Ht 5' (1 524 m)   Wt 39 9 kg (88 lb)   SpO2 96%   BMI 17 19 kg/m² Physical Exam   Appears well  Weight is up 3 lb  Lungs are clear  Heart regular  Abdomen nontender  Current Outpatient Medications:     diphenoxylate-atropine (LOMOTIL) 2 5-0 025 mg per tablet, Take 1 tablet by mouth 4 (four) times a day as needed for diarrhea, Disp: 60 tablet, Rfl: 0    ferrous sulfate 324 (65 Fe) mg, One tablet 3 times a week, Disp: 30 tablet, Rfl: 11    imatinib (GLEEVEC) 400 mg tablet, Take 1 tablet (400 mg total) by mouth daily  , Disp: 30 tablet, Rfl: 10    loratadine (CLARITIN) 10 mg tablet, Take by mouth, Disp: , Rfl:     mirtazapine (REMERON) 7 5 MG tablet, Take 1 tablet (7 5 mg total) by mouth daily at bedtime, Disp: 90 tablet, Rfl: 3    pantoprazole (PROTONIX) 20 mg tablet, TAKE 1 TABLET (20 MG TOTAL) BY MOUTH DAILY TO PREVENT HEARTBURN, Disp: 90 tablet, Rfl: 1    ondansetron (ZOFRAN-ODT) 4 mg disintegrating tablet, TAKE 1 TABLET (4 MG TOTAL) BY MOUTH EVERY 6 (SIX) HOURS AS NEEDED FOR NAUSEA OR VOMITING (Patient not taking: No sig reported), Disp: 60 tablet, Rfl: 1     No problem-specific Assessment & Plan notes found for this encounter  Diagnoses and all orders for this visit:    Chronic diarrhea  -     diphenoxylate-atropine (LOMOTIL) 2 5-0 025 mg per tablet; Take 1 tablet by mouth 4 (four) times a day as needed for diarrhea        Patient Instructions   Suggest using Lomotil in the morning  If no bowel movement that day, omitted the following day  If she has loose stools during the day, 1 Imodium after would  A daily banana would help slow things down as bananas are constipating  Increasing fiber in the diet would be helpful although she will not tolerate Metamucil  Suggest giving her 10 mini-wheets a day and hopefully she can get that in  Could try the Metamucil wafers  Suggest using zinc oxide or diaper rash paste on the small ulceration on her buttock    Return as scheduled in September

## 2022-07-26 NOTE — PATIENT INSTRUCTIONS
Suggest using Lomotil in the morning  If no bowel movement that day, omitted the following day  If she has loose stools during the day, 1 Imodium after would  A daily banana would help slow things down as bananas are constipating  Increasing fiber in the diet would be helpful although she will not tolerate Metamucil  Suggest giving her 10 mini-wheets a day and hopefully she can get that in  Could try the Metamucil wafers  Suggest using zinc oxide or diaper rash paste on the small ulceration on her buttock    Return as scheduled in September

## 2022-08-02 ENCOUNTER — TELEPHONE (OUTPATIENT)
Dept: FAMILY MEDICINE CLINIC | Facility: CLINIC | Age: 82
End: 2022-08-02

## 2022-08-02 NOTE — TELEPHONE ENCOUNTER
Spoke to daughter about the use of Lomotil  She was given 1 Lomotil and did not have a loose stool for 2 days which is an improvement  Using Lomotil every 2nd or 3rd day might keep her from having diarrhea and from getting constipated  She has increased the fiber in the diet which made less than the need for the Lomotil

## 2022-08-02 NOTE — TELEPHONE ENCOUNTER
Patients daughter calling for clarification on directions for [facility] can be reached at [phone]

## 2022-08-31 ENCOUNTER — APPOINTMENT (OUTPATIENT)
Dept: LAB | Facility: CLINIC | Age: 82
End: 2022-08-31
Payer: MEDICARE

## 2022-08-31 DIAGNOSIS — C49.A2 MALIGNANT GASTROINTESTINAL STROMAL TUMOR (GIST) OF STOMACH (HCC): ICD-10-CM

## 2022-08-31 DIAGNOSIS — D50.0 IRON DEFICIENCY ANEMIA DUE TO CHRONIC BLOOD LOSS: ICD-10-CM

## 2022-08-31 LAB
ALBUMIN SERPL BCP-MCNC: 2.9 G/DL (ref 3.5–5)
ALP SERPL-CCNC: 83 U/L (ref 46–116)
ALT SERPL W P-5'-P-CCNC: 12 U/L (ref 12–78)
ANION GAP SERPL CALCULATED.3IONS-SCNC: 3 MMOL/L (ref 4–13)
AST SERPL W P-5'-P-CCNC: 25 U/L (ref 5–45)
BASOPHILS # BLD AUTO: 0.05 THOUSANDS/ΜL (ref 0–0.1)
BASOPHILS NFR BLD AUTO: 1 % (ref 0–1)
BILIRUB SERPL-MCNC: 0.58 MG/DL (ref 0.2–1)
BUN SERPL-MCNC: 13 MG/DL (ref 5–25)
CALCIUM ALBUM COR SERPL-MCNC: 9.8 MG/DL (ref 8.3–10.1)
CALCIUM SERPL-MCNC: 8.9 MG/DL (ref 8.3–10.1)
CHLORIDE SERPL-SCNC: 104 MMOL/L (ref 96–108)
CO2 SERPL-SCNC: 29 MMOL/L (ref 21–32)
CREAT SERPL-MCNC: 0.61 MG/DL (ref 0.6–1.3)
EOSINOPHIL # BLD AUTO: 0.1 THOUSAND/ΜL (ref 0–0.61)
EOSINOPHIL NFR BLD AUTO: 2 % (ref 0–6)
ERYTHROCYTE [DISTWIDTH] IN BLOOD BY AUTOMATED COUNT: 13.7 % (ref 11.6–15.1)
FERRITIN SERPL-MCNC: 59 NG/ML (ref 8–388)
GFR SERPL CREATININE-BSD FRML MDRD: 85 ML/MIN/1.73SQ M
GLUCOSE SERPL-MCNC: 102 MG/DL (ref 65–140)
HCT VFR BLD AUTO: 34.9 % (ref 34.8–46.1)
HGB BLD-MCNC: 11.2 G/DL (ref 11.5–15.4)
IMM GRANULOCYTES # BLD AUTO: 0.04 THOUSAND/UL (ref 0–0.2)
IMM GRANULOCYTES NFR BLD AUTO: 1 % (ref 0–2)
IRON SATN MFR SERPL: 18 % (ref 15–50)
IRON SERPL-MCNC: 48 UG/DL (ref 50–170)
LYMPHOCYTES # BLD AUTO: 0.43 THOUSANDS/ΜL (ref 0.6–4.47)
LYMPHOCYTES NFR BLD AUTO: 8 % (ref 14–44)
MCH RBC QN AUTO: 33.1 PG (ref 26.8–34.3)
MCHC RBC AUTO-ENTMCNC: 32.1 G/DL (ref 31.4–37.4)
MCV RBC AUTO: 103 FL (ref 82–98)
MONOCYTES # BLD AUTO: 0.81 THOUSAND/ΜL (ref 0.17–1.22)
MONOCYTES NFR BLD AUTO: 15 % (ref 4–12)
NEUTROPHILS # BLD AUTO: 4.07 THOUSANDS/ΜL (ref 1.85–7.62)
NEUTS SEG NFR BLD AUTO: 73 % (ref 43–75)
NRBC BLD AUTO-RTO: 0 /100 WBCS
PLATELET # BLD AUTO: 187 THOUSANDS/UL (ref 149–390)
PMV BLD AUTO: 8.5 FL (ref 8.9–12.7)
POTASSIUM SERPL-SCNC: 3.9 MMOL/L (ref 3.5–5.3)
PROT SERPL-MCNC: 7.4 G/DL (ref 6.4–8.4)
RBC # BLD AUTO: 3.38 MILLION/UL (ref 3.81–5.12)
SODIUM SERPL-SCNC: 136 MMOL/L (ref 135–147)
TIBC SERPL-MCNC: 272 UG/DL (ref 250–450)
WBC # BLD AUTO: 5.5 THOUSAND/UL (ref 4.31–10.16)

## 2022-08-31 PROCEDURE — 83550 IRON BINDING TEST: CPT

## 2022-08-31 PROCEDURE — 80053 COMPREHEN METABOLIC PANEL: CPT

## 2022-08-31 PROCEDURE — 83540 ASSAY OF IRON: CPT

## 2022-08-31 PROCEDURE — 36415 COLL VENOUS BLD VENIPUNCTURE: CPT

## 2022-08-31 PROCEDURE — 82728 ASSAY OF FERRITIN: CPT

## 2022-08-31 PROCEDURE — 85025 COMPLETE CBC W/AUTO DIFF WBC: CPT

## 2022-09-08 ENCOUNTER — OFFICE VISIT (OUTPATIENT)
Dept: HEMATOLOGY ONCOLOGY | Facility: CLINIC | Age: 82
End: 2022-09-08
Payer: MEDICARE

## 2022-09-08 VITALS
OXYGEN SATURATION: 95 % | SYSTOLIC BLOOD PRESSURE: 102 MMHG | DIASTOLIC BLOOD PRESSURE: 70 MMHG | WEIGHT: 88 LBS | HEART RATE: 105 BPM | RESPIRATION RATE: 17 BRPM | TEMPERATURE: 98 F | BODY MASS INDEX: 17.28 KG/M2 | HEIGHT: 60 IN

## 2022-09-08 DIAGNOSIS — C49.A2 MALIGNANT GASTROINTESTINAL STROMAL TUMOR (GIST) OF STOMACH (HCC): Primary | ICD-10-CM

## 2022-09-08 DIAGNOSIS — D53.9 MACROCYTIC ANEMIA: ICD-10-CM

## 2022-09-08 PROCEDURE — 99214 OFFICE O/P EST MOD 30 MIN: CPT | Performed by: NURSE PRACTITIONER

## 2022-09-08 NOTE — PROGRESS NOTES
[address] [facility] [address] [address] [phone]  Oncology Progress Note  Luc Rangel, 1940, [id number]  9/8/2022        Assessment/Plan:   1  Malignant gastrointestinal stromal tumor (GIST) of stomach ([address] )  2  Macrocytic anemia   Patient is an 77-year-old female with a history of GIST tumor of the stomach currently on Gleevec 400 mg p o  once daily tolerating without difficulty  Patient is not a surgical candidate secondary to age  She is able to function with assistance  She also has a history of Alzheimer's dementia contributing to her functional status  Her weight has improved by few lb  She continues to have frequent diarrhea and is using Lomotil and Imodium as needed  Patient's daughter states patient is unable to make it to the bathroom at times  We discussed adding Metamucil or other things to bulk up her stools without causing constipation  Daughter states she will adjust accordingly  Overall blood work is stable  We will request a CT scan for the next visit  Most recent scan in June appeared slightly decreased in size from the December scan, otherwise stable  We will see her in 3 months, patient and her daughter verbalized understanding and are in agreement the plan  - CBC and differential; Future  - Comprehensive metabolic panel; Future  - CT chest abdomen pelvis w contrast; Future    Goals and Barriers:    Current Goal:   Prolong Survival from Cancer  Barriers: None  Patient's Capacity to Self Care:  Patient his able to self care      [address]   ______________________________________________________________________________________________________________    Subjective     History of present illness/Cancer History:   Oncology History   GIST (gastrointestinal stromal tumor), malignant [facility])   12/4/2020 Biopsy    December  patient had a CT scan of the abdomen pelvis  This showed a large gastric mass with air tracking suggesting perforation  She was admitted to the hospital with sepsis syndrome  Repeat CT did not confirm perforation  EGD showed gastric mass  2020 biopsy showed findings consistent with GIST tumor, Ki-67 5%, positive       2020 -  Chemotherapy    Gleevec 400mg PO daily        Lab Results   Component Value Date    WBC 5 50 2022    HGB 11 2 (L) 2022    HCT 34 9 2022     (H) 2022     2022     Lab Results   Component Value Date     10/09/2014    SODIUM 136 2022    K 3 9 2022     2022    CO2 29 2022    ANIONGAP 8 10/09/2014    AGAP 3 (L) 2022    BUN 13 2022    CREATININE 0 61 2022    GLUC 102 2022    GLUF 87 2021    CALCIUM 8 9 2022    AST 25 2022    ALT 12 2022    ALKPHOS 83 2022    PROT 7 1 10/09/2014    TP 7 4 2022    BILITOT 0 80 10/09/2014    TBILI 0 58 2022    EGFR 85 2022     Interval history:  Clinically stable     ECO - Symptomatic but completely ambulatory    Review of Systems   Constitutional: Positive for fatigue  Negative for activity change, appetite change, fever and unexpected weight change  Respiratory: Negative for cough and shortness of breath  Cardiovascular: Negative for chest pain and leg swelling  Gastrointestinal: Positive for diarrhea  Negative for abdominal pain, constipation and nausea  Endocrine: Negative for cold intolerance and heat intolerance  Musculoskeletal: Negative for arthralgias and myalgias  Skin: Negative  Neurological: Positive for weakness  Negative for dizziness and headaches  Hematological: Negative for adenopathy  Does not bruise/bleed easily         Current Outpatient Medications:     diphenoxylate-atropine (LOMOTIL) 2 5-0 025 mg per tablet, Take 1 tablet by mouth 4 (four) times a day as needed for diarrhea, Disp: 60 tablet, Rfl: 0    ferrous sulfate 324 (65 Fe) mg, One tablet 3 times a week, Disp: 30 tablet, Rfl: 11    imatinib (GLEEVEC) 400 mg tablet, Take 1 tablet (400 mg total) by mouth daily  , Disp: 30 tablet, Rfl: 10    loratadine (CLARITIN) 10 mg tablet, Take by mouth, Disp: , Rfl:     mirtazapine (REMERON) 7 5 MG tablet, Take 1 tablet (7 5 mg total) by mouth daily at bedtime, Disp: 90 tablet, Rfl: 3    pantoprazole (PROTONIX) 20 mg tablet, TAKE 1 TABLET (20 MG TOTAL) BY MOUTH DAILY TO PREVENT HEARTBURN, Disp: 90 tablet, Rfl: 1    ondansetron (ZOFRAN-ODT) 4 mg disintegrating tablet, TAKE 1 TABLET (4 MG TOTAL) BY MOUTH EVERY 6 (SIX) HOURS AS NEEDED FOR NAUSEA OR VOMITING (Patient not taking: No sig reported), Disp: 60 tablet, Rfl: 1  Allergies   Allergen Reactions    Bactrim [Sulfamethoxazole-Trimethoprim]     Simvastatin Myalgia     Objective   /70 (BP Location: Left arm, Patient Position: Sitting, Cuff Size: Adult)   Pulse 105   Temp 98 °F (36 7 °C)   Resp 17   Ht 5' (1 524 m)   Wt 39 9 kg (88 lb)   SpO2 95%   BMI 17 19 kg/m²   Wt Readings from Last 6 Encounters:   09/08/22 39 9 kg (88 lb)   07/26/22 39 9 kg (88 lb)   06/09/22 38 8 kg (85 lb 8 oz)   05/24/22 39 1 kg (86 lb 3 2 oz)   03/14/22 36 3 kg (80 lb)   02/22/22 36 6 kg (80 lb 9 6 oz)     Physical Exam  Vitals reviewed  Constitutional:       Appearance: She is well-developed  Comments: Thin appearing   HENT:      Head: Normocephalic and atraumatic  Eyes:      Conjunctiva/sclera: Conjunctivae normal       Pupils: Pupils are equal, round, and reactive to light  Pulmonary:      Effort: Pulmonary effort is normal  No respiratory distress  Musculoskeletal:         General: Normal range of motion  Cervical back: Normal range of motion  Lymphadenopathy:      Cervical: No cervical adenopathy  Skin:     General: Skin is dry     Neurological:      Mental Status: She is alert and oriented to person, place, and time  Psychiatric:         Behavior: Behavior normal      The following historical data was reviewed:  Past Medical History:   Diagnosis Date    [facility]'s dementia ([facility] ) 12/21/2021    Closed fracture of right proximal humerus 12/06/2021    GIST (gastrointestinal stromal tumor), malignant ([facility] ) 12/14/2020    High cholesterol     HTN (hypertension) 12/04/2020     Past Surgical History:   Procedure Laterality Date    FL INJECTION LEFT HIP (NON ARTHROGRAM)  5/14/2019     Please note: This report has been generated by a voice recognition software system  Therefore there may be syntax, spelling, and/or grammatical errors  Please call if you have any questions

## 2022-09-26 ENCOUNTER — TELEPHONE (OUTPATIENT)
Dept: FAMILY MEDICINE CLINIC | Facility: CLINIC | Age: 82
End: 2022-09-26

## 2022-09-26 DIAGNOSIS — J40 BRONCHITIS: Primary | ICD-10-CM

## 2022-09-26 RX ORDER — GUAIFENESIN AND CODEINE PHOSPHATE 100; 10 MG/5ML; MG/5ML
SOLUTION ORAL
Qty: 120 ML | Refills: 1 | Status: SHIPPED | OUTPATIENT
Start: 2022-09-26

## 2022-09-26 NOTE — TELEPHONE ENCOUNTER
Patient daughter calling to request prescription strength cough syrup to be sent to the pharmacy for the patient   She has a persistent cough and is keeping her up at night

## 2022-09-27 ENCOUNTER — TELEMEDICINE (OUTPATIENT)
Dept: FAMILY MEDICINE CLINIC | Facility: CLINIC | Age: 82
End: 2022-09-27
Payer: MEDICARE

## 2022-09-27 DIAGNOSIS — Z51.5 END OF LIFE CARE: ICD-10-CM

## 2022-09-27 DIAGNOSIS — G30.1 LATE ONSET ALZHEIMER'S DEMENTIA WITHOUT BEHAVIORAL DISTURBANCE (HCC): Primary | ICD-10-CM

## 2022-09-27 DIAGNOSIS — F02.80 LATE ONSET ALZHEIMER'S DEMENTIA WITHOUT BEHAVIORAL DISTURBANCE (HCC): Primary | ICD-10-CM

## 2022-09-27 DIAGNOSIS — C49.A2 MALIGNANT GASTROINTESTINAL STROMAL TUMOR (GIST) OF STOMACH (HCC): ICD-10-CM

## 2022-09-27 PROCEDURE — 99214 OFFICE O/P EST MOD 30 MIN: CPT | Performed by: FAMILY MEDICINE

## 2022-09-27 NOTE — PROGRESS NOTES
Virtual Regular Visit    Verification of patient location:    Patient is located in the following state in which I hold an active license [place]      Assessment/Plan:    Problem List Items Addressed This Visit     GIST (gastrointestinal stromal tumor), malignant ([facility] )    Alzheimer's dementia ([facility] ) - Primary      Other Visit Diagnoses     End of life care            Patient Instructions   Sabi Oates appears to be approaching end of life  Fortunately, she is comfortable  Her daughter, Ginna Loja, feels comfortable at the present time caring for her at home  We did discuss possible hospitalization which would probably not at any quality benefit to her  Suggest bed all medications can be stopped except for medicines that make her more comfortable such as Lomotil to control diarrhea  Lomotil could be increased to 2 or 3 times a day  Robitussin with codeine as needed for cough  Hospice is an option and family will consider if they need additional assistance  Daughter knows to call me as needed  Reason for visit is   Chief Complaint   Patient presents with    Follow-up     Routine F/U Care: Alzheimer, HTN, Osteoporosis    Virtual Regular Visit        Encounter provider Niya Roman MD    Provider located at [address] [place] [id number]      Recent Visits  Date Type Provider Dept   09/26/22 Telephone [facility] recent visits within past 7 days and meeting all other requirements  Today's Visits  Date Type Provider Dept   09/27/22 Telemedicine Niya Roman MD [facility] today's visits and meeting all other requirements  Future Appointments  No visits were found meeting these conditions  Showing future appointments within next 150 days and meeting all other requirements       The patient was identified by name and date of birth   Lala Lassiter was informed that this is a telemedicine visit and that the visit is being conducted through [facility] and patient was informed this is a secure, HIPAA-complaint platform  She agrees to proceed     My office door was closed  No one else was in the room  She acknowledged consent and understanding of privacy and security of the video platform  The patient has agreed to participate and understands they can discontinue the visit at any time  Patient is aware this is a billable service  Marian Rivera is a 80 y o  female video visis for  follow-up of gastrointestinal stromal tumor, Alzheimer's dementia, and hypertension,   Has had a significant decline since 9/17  Caught a cold  Withdrawn  Unsteady  Poor walking  Before the 17th, was going out for supper  Sleeps most of the time  Eating next to nothing  Can't stand on her feet  Using a walker  Speech is low  For cough, I had called in [facility]  Getting lomotil every day for the diarrhea  Past Medical History:   Diagnosis Date    [facility] 12/21/2021    Closed fracture of right proximal humerus 12/06/2021    GIST (gastrointestinal stromal tumor), [facility] ) 12/14/2020    High cholesterol     HTN (hypertension) 12/04/2020       Past Surgical History:   Procedure Laterality Date    FL INJECTION LEFT HIP (NON ARTHROGRAM)  5/14/2019       Current Outpatient Medications   Medication Sig Dispense Refill    diphenoxylate-atropine (LOMOTIL) 2 5-0 025 mg per tablet Take 1 tablet by mouth 4 (four) times a day as needed for diarrhea 60 tablet 0    ferrous sulfate 324 (65 Fe) mg One tablet 3 times a week 30 tablet 11    guaifenesin-codeine (GUAIFENESIN AC) 100-10 MG/5ML liquid 1-2 tsp every 4 hours as needed for cough 120 mL 1    imatinib (GLEEVEC) 400 mg tablet Take 1 tablet (400 mg total) by mouth daily   30 tablet 10    loratadine (CLARITIN) 10 mg tablet Take by mouth      pantoprazole (PROTONIX) 20 mg tablet TAKE 1 TABLET (20 MG TOTAL) BY MOUTH DAILY TO PREVENT HEARTBURN 90 tablet 1    mirtazapine (REMERON) 7 5 MG tablet Take 1 tablet (7 5 mg total) by mouth daily at bedtime (Patient not taking: Reported on 9/27/2022) 90 tablet 3    ondansetron (ZOFRAN-ODT) 4 mg disintegrating tablet TAKE 1 TABLET (4 MG TOTAL) BY MOUTH EVERY 6 (SIX) HOURS AS NEEDED FOR NAUSEA OR VOMITING (Patient not taking: No sig reported) 60 tablet 1     No current facility-administered medications for this visit  Allergies   Allergen Reactions    Bactrim [Sulfamethoxazole-Trimethoprim]     Simvastatin Myalgia       Review of Systems    Video Exam    Vitals:       Physical Exam   Awake  Answers simple questions  Appears comfortable       I spent 16 minutes directly with the patient during this visit

## 2022-09-27 NOTE — PATIENT INSTRUCTIONS
Fidencio Pennington appears to be approaching end of life  Fortunately, she is comfortable  Her daughter, Marcelle Melendez, feels comfortable at the present time caring for her at home  We did discuss possible hospitalization which would probably not at any quality benefit to her  Suggest bed all medications can be stopped except for medicines that make her more comfortable such as Lomotil to control diarrhea  Lomotil could be increased to 2 or 3 times a day  Robitussin with codeine as needed for cough  Hospice is an option and family will consider if they need additional assistance  Daughter knows to call me as needed

## 2022-10-04 ENCOUNTER — TELEPHONE (OUTPATIENT)
Dept: HEMATOLOGY ONCOLOGY | Facility: CLINIC | Age: 82
End: 2022-10-04

## 2022-10-04 DIAGNOSIS — D50.0 IRON DEFICIENCY ANEMIA DUE TO CHRONIC BLOOD LOSS: Primary | ICD-10-CM

## 2022-10-04 RX ORDER — FERROUS SULFATE TAB EC 324 MG (65 MG FE EQUIVALENT) 324 (65 FE) MG
TABLET DELAYED RESPONSE ORAL
Qty: 30 TABLET | Refills: 11 | Status: SHIPPED | OUTPATIENT
Start: 2022-10-04

## 2022-10-04 NOTE — TELEPHONE ENCOUNTER
Medication Refill     Who is Calling daughter   Medication ferrous sulfate 324 (65 Fe) mg        How many pills left none   Preferred Pharmacy / Address [facility]/pharmacy #[id number] [place] [place] - [address]   [address], [address] [place] [address]   Phone:  [phone]  Fax:  [phone]    Who is your Physician?  Bernarda   Call back number [phone]   Relevant Information  none

## 2022-10-25 DIAGNOSIS — K52.9 CHRONIC DIARRHEA: ICD-10-CM

## 2022-10-25 RX ORDER — DIPHENOXYLATE HYDROCHLORIDE AND ATROPINE SULFATE 2.5; .025 MG/1; MG/1
1 TABLET ORAL 4 TIMES DAILY PRN
Qty: 60 TABLET | Refills: 0 | Status: SHIPPED | OUTPATIENT
Start: 2022-10-25

## 2022-10-28 ENCOUNTER — APPOINTMENT (OUTPATIENT)
Dept: RADIOLOGY | Facility: CLINIC | Age: 82
End: 2022-10-28

## 2022-10-28 ENCOUNTER — OFFICE VISIT (OUTPATIENT)
Dept: URGENT CARE | Facility: CLINIC | Age: 82
End: 2022-10-28
Payer: MEDICARE

## 2022-10-28 VITALS
SYSTOLIC BLOOD PRESSURE: 118 MMHG | RESPIRATION RATE: 18 BRPM | HEIGHT: 60 IN | TEMPERATURE: 98.2 F | WEIGHT: 88 LBS | HEART RATE: 96 BPM | DIASTOLIC BLOOD PRESSURE: 68 MMHG | BODY MASS INDEX: 17.28 KG/M2 | OXYGEN SATURATION: 98 %

## 2022-10-28 DIAGNOSIS — S09.90XA INJURY OF HEAD, INITIAL ENCOUNTER: ICD-10-CM

## 2022-10-28 DIAGNOSIS — S83.8X2A SPRAIN OF OTHER LIGAMENT OF LEFT KNEE, INITIAL ENCOUNTER: ICD-10-CM

## 2022-10-28 DIAGNOSIS — S69.92XA INJURY OF LEFT HAND, INITIAL ENCOUNTER: ICD-10-CM

## 2022-10-28 DIAGNOSIS — S60.222A CONTUSION OF LEFT HAND, INITIAL ENCOUNTER: Primary | ICD-10-CM

## 2022-10-28 PROCEDURE — G0463 HOSPITAL OUTPT CLINIC VISIT: HCPCS

## 2022-10-28 PROCEDURE — 99213 OFFICE O/P EST LOW 20 MIN: CPT

## 2022-10-28 NOTE — PROGRESS NOTES
[facility]        NAME: Zain Westbrook is a 80 y o  female  : 1940    MRN: [id number]  DATE: 2022  TIME: 11:12 AM    /68   Pulse 96   Temp 98 2 °F (36 8 °C)   Resp 18   Ht 5' (1 524 m)   Wt 39 9 kg (88 lb)   SpO2 98%   BMI 17 19 kg/m²     Assessment and Plan   Contusion of left hand, initial encounter [S60 222A]  1  Contusion of left hand, initial encounter  XR hand 3+ vw left    Compression bandages    Splint    Splint application    Ambulatory referral to Orthopedic Surgery   2  Sprain of other ligament of left knee, initial encounter  Splint application    Ambulatory referral to Orthopedic Surgery   3  Injury of head, initial encounter  Splint application    Ambulatory referral to Orthopedic Surgery         Patient Instructions       Follow up with PCP in 3-5 days  Proceed to  ER if symptoms worsen  Chief Complaint     Chief Complaint   Patient presents with   • Fall     Daughter states patient fell while sitting on the toilet last night resulting in left hand finger and finger pain  Reports left elbow abrasion and left knee abrasion  Daughter states no LOC at time of injury  Pt Jamul  History of Present Illness       Pt with fall in bathroom last you,  Pt fell while picking something up from the floor, pt with left hand and left knee pain,        Review of Systems   Review of Systems   Constitutional: Negative  HENT: Negative  Eyes: Negative  Respiratory: Negative  Cardiovascular: Negative  Gastrointestinal: Negative  Endocrine: Negative  Genitourinary: Negative  Musculoskeletal: Negative  Skin: Negative  Allergic/Immunologic: Negative  Neurological: Negative  Psychiatric/Behavioral: Negative  All other systems reviewed and are negative          Current Medications       Current Outpatient Medications:   •  diphenoxylate-atropine (LOMOTIL) 2 5-0 025 mg per tablet, Take 1 tablet by mouth 4 (four) times a day as needed for diarrhea, Disp: 60 tablet, Rfl: 0  •  ferrous sulfate 324 (65 Fe) mg, One tablet 3 times a week, Disp: 30 tablet, Rfl: 11  •  imatinib (GLEEVEC) 400 mg tablet, Take 1 tablet (400 mg total) by mouth daily  , Disp: 30 tablet, Rfl: 10  •  loratadine (CLARITIN) 10 mg tablet, Take by mouth, Disp: , Rfl:   •  pantoprazole (PROTONIX) 20 mg tablet, TAKE 1 TABLET (20 MG TOTAL) BY MOUTH DAILY TO PREVENT HEARTBURN, Disp: 90 tablet, Rfl: 1  •  guaifenesin-codeine (GUAIFENESIN AC) 100-10 MG/5ML liquid, 1-2 tsp every 4 hours as needed for cough (Patient not taking: Reported on 10/28/2022), Disp: 120 mL, Rfl: 1    Current Allergies     Allergies as of 10/28/2022 - Reviewed 10/28/2022   Allergen Reaction Noted   • Bactrim [sulfamethoxazole-trimethoprim]  08/01/2017   • Simvastatin Myalgia 01/29/2013            The following portions of the patient's history were reviewed and updated as appropriate: allergies, current medications, past family history, past medical history, past social history, past surgical history and problem list      Past Medical History:   Diagnosis Date   • Alzheimer's dementia ([facility] ) 12/21/2021   • Closed fracture of right proximal humerus 12/06/2021   • GIST (gastrointestinal stromal tumor), malignant ([facility] ) 12/14/2020   • High cholesterol    • HTN (hypertension) 12/04/2020       Past Surgical History:   Procedure Laterality Date   • FL INJECTION LEFT HIP (NON ARTHROGRAM)  5/14/2019       Family History   Problem Relation Age of Onset   • Colon cancer Mother    • Lung cancer Mother    • Tuberculosis Father          Medications have been verified  Objective   /68   Pulse 96   Temp 98 2 °F (36 8 °C)   Resp 18   Ht 5' (1 524 m)   Wt 39 9 kg (88 lb)   SpO2 98%   BMI 17 19 kg/m²          Physical Exam     Physical Exam  Vitals and nursing note reviewed  Constitutional:       Appearance: Normal appearance        Comments: Had discussion with pt and with family about er evaluation for head injury Family states pt ambulation is her normal     HENT:      Head:      Comments: Left forehead 2cm x 3cm ecchymosis and tenderness to palp      Right Ear: External ear normal       Left Ear: External ear normal       Ears:      Comments: + cerumen      Nose: Nose normal       Mouth/Throat:      Mouth: Mucous membranes are moist       Pharynx: Oropharynx is clear  Eyes:      Conjunctiva/sclera: Conjunctivae normal       Pupils: Pupils are equal, round, and reactive to light  Cardiovascular:      Rate and Rhythm: Normal rate and regular rhythm  Pulses: Normal pulses  Heart sounds: Normal heart sounds  Pulmonary:      Effort: Pulmonary effort is normal       Breath sounds: Normal breath sounds  Abdominal:      General: Abdomen is flat  Bowel sounds are normal       Palpations: Abdomen is soft  Musculoskeletal:         General: Normal range of motion  Cervical back: Normal range of motion and neck supple  Comments: Left hand dorsum swelling and slight ecchymosis 3rd mcp fingers from  No wrist pain   Wrist from     Left knee from tibial tuberosity abrasion and tenderness    Skin:     General: Skin is warm  Capillary Refill: Capillary refill takes less than 2 seconds  Neurological:      General: No focal deficit present  Mental Status: She is alert  Psychiatric:         Mood and Affect: Mood normal            Splint application    Date/Time: 10/28/2022 11:05 AM  Performed by: Tianna Damon PA-C  Authorized by: Tianna Damon PA-C   Universal Protocol:  Procedure performed by:  Consent: Verbal consent obtained  Written consent not obtained    Consent given by: patient  Patient identity confirmed: verbally with patient      Pre-procedure details:     Sensation:  Normal  Procedure details:     Laterality:  Left    Location:  Hand    Hand:  L hand    Strapping: no  Cast type:  Short arm      Splint type:  Short arm splint, static (forearm to hand)    Supplies: Waterproof  Post-procedure details:     Pain:  Improved    Sensation:  Normal    Patient tolerance of procedure:   Tolerated well, no immediate complications

## 2022-11-07 ENCOUNTER — TELEPHONE (OUTPATIENT)
Dept: FAMILY MEDICINE CLINIC | Facility: CLINIC | Age: 82
End: 2022-11-07

## 2022-11-07 DIAGNOSIS — F02.80 LATE ONSET ALZHEIMER'S DEMENTIA WITHOUT BEHAVIORAL DISTURBANCE (HCC): Primary | ICD-10-CM

## 2022-11-07 DIAGNOSIS — G30.1 LATE ONSET ALZHEIMER'S DEMENTIA WITHOUT BEHAVIORAL DISTURBANCE (HCC): Primary | ICD-10-CM

## 2022-11-07 NOTE — TELEPHONE ENCOUNTER
Daughter called and stated that the patient is not sleeping at night and is keeping her  awake all night, which then the  is calling the daughter to see if there is anything that she can do  Patient is sleeping during the day and  and daughter would like to know if you could prescribe her a sleeping pill for her to sleep at night so they both can get the rest they need    Please call Kristen Akers to advise

## 2022-11-09 RX ORDER — TRAZODONE HYDROCHLORIDE 50 MG/1
TABLET ORAL
Qty: 60 TABLET | Refills: 5 | Status: SHIPPED | OUTPATIENT
Start: 2022-11-09

## 2022-11-09 NOTE — TELEPHONE ENCOUNTER
Spoke to Arcenio Jarrett  Mother sleeping during the day and up frequently during the night  Would like to change at sleep cycle  Trial of trazodone 50 mg, 1-2 tablets at bedtime

## 2022-11-28 ENCOUNTER — APPOINTMENT (OUTPATIENT)
Dept: LAB | Facility: CLINIC | Age: 82
End: 2022-11-28

## 2022-11-28 DIAGNOSIS — C49.A2 MALIGNANT GASTROINTESTINAL STROMAL TUMOR (GIST) OF STOMACH (HCC): ICD-10-CM

## 2022-11-28 DIAGNOSIS — D53.9 MACROCYTIC ANEMIA: ICD-10-CM

## 2022-11-28 LAB
ALBUMIN SERPL BCP-MCNC: 2.2 G/DL (ref 3.5–5)
ALP SERPL-CCNC: 111 U/L (ref 46–116)
ALT SERPL W P-5'-P-CCNC: 11 U/L (ref 12–78)
ANION GAP SERPL CALCULATED.3IONS-SCNC: 5 MMOL/L (ref 4–13)
AST SERPL W P-5'-P-CCNC: 27 U/L (ref 5–45)
BASOPHILS # BLD AUTO: 0.03 THOUSANDS/ÂΜL (ref 0–0.1)
BASOPHILS NFR BLD AUTO: 1 % (ref 0–1)
BILIRUB SERPL-MCNC: 0.56 MG/DL (ref 0.2–1)
BUN SERPL-MCNC: 11 MG/DL (ref 5–25)
CALCIUM ALBUM COR SERPL-MCNC: 10.1 MG/DL (ref 8.3–10.1)
CALCIUM SERPL-MCNC: 8.7 MG/DL (ref 8.3–10.1)
CHLORIDE SERPL-SCNC: 101 MMOL/L (ref 96–108)
CO2 SERPL-SCNC: 27 MMOL/L (ref 21–32)
CREAT SERPL-MCNC: 0.49 MG/DL (ref 0.6–1.3)
EOSINOPHIL # BLD AUTO: 0.02 THOUSAND/ÂΜL (ref 0–0.61)
EOSINOPHIL NFR BLD AUTO: 1 % (ref 0–6)
ERYTHROCYTE [DISTWIDTH] IN BLOOD BY AUTOMATED COUNT: 14.4 % (ref 11.6–15.1)
GFR SERPL CREATININE-BSD FRML MDRD: 91 ML/MIN/1.73SQ M
GLUCOSE P FAST SERPL-MCNC: 98 MG/DL (ref 65–99)
HCT VFR BLD AUTO: 28.3 % (ref 34.8–46.1)
HGB BLD-MCNC: 8.8 G/DL (ref 11.5–15.4)
IMM GRANULOCYTES # BLD AUTO: 0.07 THOUSAND/UL (ref 0–0.2)
IMM GRANULOCYTES NFR BLD AUTO: 2 % (ref 0–2)
LYMPHOCYTES # BLD AUTO: 0.36 THOUSANDS/ÂΜL (ref 0.6–4.47)
LYMPHOCYTES NFR BLD AUTO: 9 % (ref 14–44)
MCH RBC QN AUTO: 31 PG (ref 26.8–34.3)
MCHC RBC AUTO-ENTMCNC: 31.1 G/DL (ref 31.4–37.4)
MCV RBC AUTO: 100 FL (ref 82–98)
MONOCYTES # BLD AUTO: 0.84 THOUSAND/ÂΜL (ref 0.17–1.22)
MONOCYTES NFR BLD AUTO: 21 % (ref 4–12)
NEUTROPHILS # BLD AUTO: 2.7 THOUSANDS/ÂΜL (ref 1.85–7.62)
NEUTS SEG NFR BLD AUTO: 66 % (ref 43–75)
NRBC BLD AUTO-RTO: 0 /100 WBCS
PLATELET # BLD AUTO: 162 THOUSANDS/UL (ref 149–390)
PMV BLD AUTO: 8.7 FL (ref 8.9–12.7)
POTASSIUM SERPL-SCNC: 3.8 MMOL/L (ref 3.5–5.3)
PROT SERPL-MCNC: 6.2 G/DL (ref 6.4–8.4)
RBC # BLD AUTO: 2.84 MILLION/UL (ref 3.81–5.12)
SODIUM SERPL-SCNC: 133 MMOL/L (ref 135–147)
WBC # BLD AUTO: 4.02 THOUSAND/UL (ref 4.31–10.16)

## 2022-12-04 ENCOUNTER — HOSPITAL ENCOUNTER (OUTPATIENT)
Dept: CT IMAGING | Facility: HOSPITAL | Age: 82
Discharge: HOME | End: 2022-12-04

## 2022-12-04 DIAGNOSIS — C49.A2 MALIGNANT GASTROINTESTINAL STROMAL TUMOR (GIST) OF STOMACH (HCC): ICD-10-CM

## 2022-12-04 RX ADMIN — IOHEXOL 85 ML: 350 INJECTION, SOLUTION INTRAVENOUS at 10:26

## 2022-12-08 ENCOUNTER — TELEPHONE (OUTPATIENT)
Dept: HEMATOLOGY ONCOLOGY | Facility: CLINIC | Age: 82
End: 2022-12-08

## 2022-12-12 DIAGNOSIS — C49.A2 MALIGNANT GASTROINTESTINAL STROMAL TUMOR (GIST) OF STOMACH (HCC): ICD-10-CM

## 2022-12-12 RX ORDER — PANTOPRAZOLE SODIUM 20 MG/1
20 TABLET, DELAYED RELEASE ORAL DAILY
Qty: 90 TABLET | Refills: 1 | Status: SHIPPED | OUTPATIENT
Start: 2022-12-12

## 2022-12-15 ENCOUNTER — HOSPICE ADMISSION (OUTPATIENT)
Dept: HOME HOSPICE | Facility: HOSPICE | Age: 82
End: 2022-12-15

## 2022-12-15 ENCOUNTER — OFFICE VISIT (OUTPATIENT)
Dept: HEMATOLOGY ONCOLOGY | Facility: CLINIC | Age: 82
End: 2022-12-15

## 2022-12-15 ENCOUNTER — HOME CARE VISIT (OUTPATIENT)
Dept: HOME HEALTH SERVICES | Facility: HOME HEALTHCARE | Age: 82
End: 2022-12-15

## 2022-12-15 VITALS
BODY MASS INDEX: 18.09 KG/M2 | WEIGHT: 95.8 LBS | HEIGHT: 61 IN | SYSTOLIC BLOOD PRESSURE: 108 MMHG | TEMPERATURE: 99.6 F | DIASTOLIC BLOOD PRESSURE: 68 MMHG | HEART RATE: 76 BPM | OXYGEN SATURATION: 91 %

## 2022-12-15 DIAGNOSIS — C49.A2 MALIGNANT GASTROINTESTINAL STROMAL TUMOR (GIST) OF STOMACH (HCC): Primary | ICD-10-CM

## 2022-12-15 NOTE — PROGRESS NOTES
[facility] [place]  [facility] [facility] [address] [phone]    Joselyn ALVAREZ,[birth year], [id number]  12/15/22    Discussion:   In summary, this is an 60-year-old female with history of GIST tumor as outlined  Over the past few months she has had progressive functional decline, increasing weakness, decreasing appetite, more somnolent  She has developed lower extremity edema  CAT scan of the chest pelvis shows left greater than right moderate pleural effusions, new hepatic lesions, suggestive of progressive disease  I suppose another malignancy could also be present  We discussed that further diagnostics and therapeutics could be considered  Alternatively, hospice is a reasonable consideration given her advanced age, poor performance status, etc   After reviewing the above the patient's daughter is agreeable to engage hospice and we made arrangements accordingly  ______________________________________________________________________    No chief complaint on file  HPI:  Oncology History   GIST (gastrointestinal stromal tumor), malignant ([facility] )   12/4/2020 Biopsy    December 2020 patient had a CT scan of the abdomen pelvis  This showed a large gastric mass with air tracking suggesting perforation  She was admitted to the hospital with sepsis syndrome  Repeat CT did not confirm perforation  EGD showed gastric mass  December 4, 2020 biopsy showed findings consistent with GIST tumor, Ki-67 5%, positive       12/30/2020 -  Chemotherapy    Gleevec 400mg PO daily         Interval History: See assessment and plan  ECOG-  3-symptomatic, greater than 50% sedentary  Review of Systems   Constitutional: Positive for fatigue  Negative for appetite change, diaphoresis and fever  HENT: Negative for sinus pain  Eyes: Negative for discharge  Respiratory: Negative for cough and shortness of breath      Cardiovascular: Negative for chest pain    Gastrointestinal: Positive for diarrhea  Negative for abdominal pain and constipation  Endocrine: Negative for cold intolerance  Genitourinary: Negative for difficulty urinating and hematuria  Musculoskeletal: Negative for joint swelling  Skin: Negative for rash  Allergic/Immunologic: Negative for environmental allergies  Neurological: Positive for weakness  Negative for dizziness and headaches  Hematological: Negative for adenopathy  Psychiatric/Behavioral: Positive for sleep disturbance  Negative for agitation  Past Medical History:   Diagnosis Date   • Alzheimer's dementia ([place] [address] ) 12/21/2021   • Closed fracture of right proximal humerus 12/06/2021   • GIST (gastrointestinal stromal tumor), malignant ([place] [address] ) 12/14/2020   • High cholesterol    • HTN (hypertension) 12/04/2020     Patient Active Problem List   Diagnosis   • Osteoporosis   • Lumbar spondylosis   • Iron deficiency anemia due to chronic blood loss   • HTN (hypertension)   • GIST (gastrointestinal stromal tumor), malignant (HCC)   • Protein-calorie malnutrition (HCC)   • CKD (chronic kidney disease) stage 2, GFR 60-89 ml/min   • Alzheimer's dementia (HCC)   • Leukopenia       Current Outpatient Medications:   •  diphenoxylate-atropine (LOMOTIL) 2 5-0 025 mg per tablet, Take 1 tablet by mouth 4 (four) times a day as needed for diarrhea, Disp: 60 tablet, Rfl: 0  •  ferrous sulfate 324 (65 Fe) mg, One tablet 3 times a week, Disp: 30 tablet, Rfl: 11  •  imatinib (GLEEVEC) 400 mg tablet, Take 1 tablet (400 mg total) by mouth daily  , Disp: 30 tablet, Rfl: 10  •  loratadine (CLARITIN) 10 mg tablet, Take by mouth, Disp: , Rfl:   •  pantoprazole (PROTONIX) 20 mg tablet, TAKE 1 TABLET (20 MG TOTAL) BY MOUTH DAILY TO PREVENT HEARTBURN, Disp: 90 tablet, Rfl: 1  •  traZODone (DESYREL) 50 mg tablet, TAKE 1 TO 2 TABLETS BY MOUTH AT BEDTIME AS NEEDED FOR SLEEP, Disp: 180 tablet, Rfl: 2  •  guaifenesin-codeine (GUAIFENESIN AC) 100-10 MG/5ML liquid, 1-2 tsp every 4 hours as needed for cough (Patient not taking: Reported on 12/15/2022), Disp: 120 mL, Rfl: 1  Allergies   Allergen Reactions   • Bactrim [Sulfamethoxazole-Trimethoprim]    • Simvastatin Myalgia     Past Surgical History:   Procedure Laterality Date   • FL INJECTION LEFT HIP (NON ARTHROGRAM)  5/14/2019     Social History     Objective:  Vitals:    12/15/22 0841   BP: 108/68   BP Location: Left arm   Patient Position: Sitting   Cuff Size: Standard   Pulse: 76   Temp: 99 6 °F (37 6 °C)   TempSrc: Temporal   SpO2: 91%   Weight: 43 5 kg (95 lb 12 8 oz)   Height: 5' 1" (1 549 m)     Physical Exam  Constitutional:       Appearance: She is well-developed  HENT:      Head: Normocephalic and atraumatic  Eyes:      Pupils: Pupils are equal, round, and reactive to light  Cardiovascular:      Rate and Rhythm: Normal rate  Heart sounds: No murmur heard  Pulmonary:      Effort: No respiratory distress  Breath sounds: No wheezing or rales  Abdominal:      General: There is no distension  Palpations: Abdomen is soft  Tenderness: There is no abdominal tenderness  There is no rebound  Musculoskeletal:         General: No tenderness  Cervical back: Neck supple  Lymphadenopathy:      Cervical: No cervical adenopathy  Skin:     General: Skin is warm  Findings: No rash  Neurological:      Mental Status: She is alert and oriented to person, place, and time  Deep Tendon Reflexes: Reflexes normal    Psychiatric:         Thought Content: Thought content normal            Labs: I personally reviewed the labs and imaging pertinent to this patient care

## 2022-12-16 ENCOUNTER — HOME CARE VISIT (OUTPATIENT)
Dept: HOME HEALTH SERVICES | Facility: HOME HEALTHCARE | Age: 82
End: 2022-12-16

## 2022-12-16 VITALS
SYSTOLIC BLOOD PRESSURE: 90 MMHG | TEMPERATURE: 99.7 F | RESPIRATION RATE: 22 BRPM | WEIGHT: 95 LBS | HEIGHT: 61 IN | BODY MASS INDEX: 17.94 KG/M2 | HEART RATE: 100 BPM | DIASTOLIC BLOOD PRESSURE: 62 MMHG

## 2022-12-20 ENCOUNTER — HOME CARE VISIT (OUTPATIENT)
Dept: HOME HOSPICE | Facility: HOSPICE | Age: 82
End: 2022-12-20

## 2022-12-21 ENCOUNTER — HOME CARE VISIT (OUTPATIENT)
Dept: HOME HEALTH SERVICES | Facility: HOME HEALTHCARE | Age: 82
End: 2022-12-21

## 2022-12-21 VITALS
SYSTOLIC BLOOD PRESSURE: 132 MMHG | TEMPERATURE: 98.9 F | RESPIRATION RATE: 18 BRPM | DIASTOLIC BLOOD PRESSURE: 68 MMHG | HEART RATE: 103 BPM

## 2022-12-29 ENCOUNTER — HOME CARE VISIT (OUTPATIENT)
Dept: HOME HEALTH SERVICES | Facility: HOME HEALTHCARE | Age: 82
End: 2022-12-29

## 2022-12-29 VITALS
HEART RATE: 94 BPM | TEMPERATURE: 99.1 F | SYSTOLIC BLOOD PRESSURE: 142 MMHG | DIASTOLIC BLOOD PRESSURE: 70 MMHG | RESPIRATION RATE: 20 BRPM

## 2023-01-01 ENCOUNTER — HOME CARE VISIT (OUTPATIENT)
Dept: HOME HOSPICE | Facility: HOSPICE | Age: 83
End: 2023-01-01

## 2023-01-01 ENCOUNTER — HOME CARE VISIT (OUTPATIENT)
Dept: HOME HEALTH SERVICES | Facility: HOME HEALTHCARE | Age: 83
End: 2023-01-01

## 2023-01-01 VITALS — RESPIRATION RATE: 20 BRPM | DIASTOLIC BLOOD PRESSURE: 60 MMHG | HEART RATE: 89 BPM | SYSTOLIC BLOOD PRESSURE: 90 MMHG

## 2023-01-01 VITALS — RESPIRATION RATE: 27 BRPM | HEART RATE: 110 BPM

## 2023-01-01 VITALS — HEART RATE: 76 BPM | RESPIRATION RATE: 16 BRPM

## 2023-01-01 DIAGNOSIS — K52.9 CHRONIC DIARRHEA: ICD-10-CM

## 2023-01-02 RX ORDER — DIPHENOXYLATE HYDROCHLORIDE AND ATROPINE SULFATE 2.5; .025 MG/1; MG/1
TABLET ORAL
Qty: 60 TABLET | Refills: 0 | Status: SHIPPED | OUTPATIENT
Start: 2023-01-02

## 2023-01-02 NOTE — TELEPHONE ENCOUNTER
Medication: Diphenoxylate Hcl-atropine Sulfate (Tablet)  [facility]   [id number] 10/25/2022 10/25/2022 Diphenoxylate Hcl-atropine Sulfate (Tablet) 60 0 15 0 025 MG-2 5 MG NA Wero Mccarthyon

## 2023-01-05 ENCOUNTER — HOME CARE VISIT (OUTPATIENT)
Dept: HOME HEALTH SERVICES | Facility: HOME HEALTHCARE | Age: 83
End: 2023-01-05

## 2023-01-05 VITALS
DIASTOLIC BLOOD PRESSURE: 64 MMHG | HEART RATE: 80 BPM | TEMPERATURE: 98.2 F | RESPIRATION RATE: 16 BRPM | SYSTOLIC BLOOD PRESSURE: 138 MMHG

## 2023-01-12 ENCOUNTER — HOME CARE VISIT (OUTPATIENT)
Dept: HOME HEALTH SERVICES | Facility: HOME HEALTHCARE | Age: 83
End: 2023-01-12

## 2023-01-12 VITALS
HEART RATE: 100 BPM | RESPIRATION RATE: 24 BRPM | SYSTOLIC BLOOD PRESSURE: 132 MMHG | TEMPERATURE: 97.7 F | DIASTOLIC BLOOD PRESSURE: 68 MMHG

## 2023-01-17 ENCOUNTER — APPOINTMENT (EMERGENCY)
Dept: RADIOLOGY | Facility: HOSPITAL | Age: 83
End: 2023-01-17

## 2023-01-17 ENCOUNTER — HOME CARE VISIT (OUTPATIENT)
Dept: HOME HOSPICE | Facility: HOSPICE | Age: 83
End: 2023-01-17

## 2023-01-17 ENCOUNTER — HOME CARE VISIT (OUTPATIENT)
Dept: HOME HEALTH SERVICES | Facility: HOME HEALTHCARE | Age: 83
End: 2023-01-17

## 2023-01-17 ENCOUNTER — HOSPITAL ENCOUNTER (EMERGENCY)
Facility: HOSPITAL | Age: 83
Discharge: HOME | End: 2023-01-18
Attending: EMERGENCY MEDICINE

## 2023-01-17 VITALS
RESPIRATION RATE: 20 BRPM | TEMPERATURE: 97.5 F | OXYGEN SATURATION: 100 % | SYSTOLIC BLOOD PRESSURE: 165 MMHG | HEART RATE: 104 BPM | DIASTOLIC BLOOD PRESSURE: 98 MMHG

## 2023-01-17 DIAGNOSIS — R26.2 AMBULATORY DYSFUNCTION: ICD-10-CM
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
## 2023-01-17 DIAGNOSIS — S09.90XA INJURY OF HEAD, INITIAL ENCOUNTER: ICD-10-CM

## 2023-01-17 RX ORDER — ACETAMINOPHEN 325 MG/1
650 TABLET ORAL ONCE
Status: COMPLETED | OUTPATIENT
Start: 2023-01-17 | End: 2023-01-17

## 2023-01-17 RX ADMIN — ACETAMINOPHEN 650 MG: 325 TABLET ORAL at 23:36

## 2023-01-18 ENCOUNTER — HOME CARE VISIT (OUTPATIENT)
Dept: HOME HEALTH SERVICES | Facility: HOME HEALTHCARE | Age: 83
End: 2023-01-18

## 2023-01-18 NOTE — ED ATTENDING ATTESTATION
1/17/2023  Joie ARMANDO MD, saw and evaluated the patient  I have discussed the patient with the resident/non-physician practitioner and agree with the resident's/non-physician practitioner's findings, Plan of Care, and MDM as documented in the resident's/non-physician practitioner's note, except where noted  All available labs and Radiology studies were reviewed  I was present for key portions of any procedure(s) performed by the resident/non-physician practitioner and I was immediately available to provide assistance  At this point I agree with the current assessment done in the Emergency Department  I have conducted an independent evaluation of this patient a history and physical is as follows:    ED Course  ED Course as of 01/18/23 0120   Tue Jan 17, 2023   2357 79 YO F ambulatory dysfunction; dementia; fell while trying to walk to commode ; no LOC; no AC; no AP ; no complaints in ED O: R forehead ecchymosis; I/P CTH; CTCS; hospice plan     Emergency Department Note- Ward Pan 80 y o  female MRN: [id number]    Unit/Bed#: ED 23 Encounter: [id number]    Ward Pan is a 80 y o  female who presents with   Chief Complaint   Patient presents with   • Fall     Pt fell forward hitting forehead, no thinners, no LOC  History of Present Illness   HPI:  Ward Pan is a 80 y o  female who presents for evaluation of:  Castillo Mustafa while walking to the commode this evening  Patient has a history of dementia and ambulatory dysfunction; she is not supposed to walk independently  Patient walked independently to the bedside commode, fell striking her head  She does not take any anticoagulants or antiplatelet medications  The patient has no complaints in the emergency department  She is unable to provide any further history or review of systems secondary to chronic dementia      Review of Systems   Unable to perform ROS: Dementia       Historical Information   Past Medical History:   Diagnosis Date   • Alzheimer's dementia ([facility] ) 12/21/2021   • Closed fracture of right proximal humerus 12/06/2021   • GIST (gastrointestinal stromal tumor), 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   • High cholesterol    • HTN (hypertension) 12/04/2020     Past Surgical History:   Procedure Laterality Date   • FL INJECTION LEFT HIP (NON ARTHROGRAM)  5/14/2019     Social History   Social History     Substance and Sexual Activity   Alcohol Use Yes    Comment: occasional     Social History     Substance and Sexual Activity   Drug Use Never     Social History     Tobacco Use   Smoking Status Never   Smokeless Tobacco Never     Family History:   Family History   Problem Relation Age of Onset   • Colon cancer Mother    • Lung cancer Mother    • Tuberculosis Father        Meds/Allergies   PTA meds:   Prior to Admission Medications   Prescriptions Last Dose Informant Patient Reported? Taking? LORazepam (Ativan) 0 5 mg tablet   Yes No   Sig: Take 0 5 mg by mouth every 6 (six) hours as needed for anxiety (restlessness or SOB)  CP on HOLD  Indications: Feeling Anxious, restlessness or SOB   Morphine Sulfate, Concentrate, 20 mg/mL concentrated solution   Yes No   Sig: Take 5 mg by mouth every 3 (three) hours as needed (pain or SOB)  CP on HOLD  Indications: Difficulty Breathing, Pain   QUEtiapine (SEROquel) 25 mg tablet   Yes No   Sig: Take 25 mg by mouth daily at bedtime  Indications: Restless   acetaminophen (TYLENOL) 325 mg tablet   Yes No   Sig: Take 650 mg by mouth every 6 (six) hours as needed for fever or mild pain  Indications: Fever, Pain   bisacodyl (DULCOLAX) 10 mg suppository   Yes No   Sig: Insert 10 mg into the rectum daily as needed for constipation   CP on HOLD  Indications: Constipation   diphenoxylate-atropine (LOMOTIL) 2 5-0 025 mg per tablet   No No   Sig: TAKE 1 TABLET BY MOUTH 4 TIMES A DAY AS NEEDED FOR DIARRHEA    haloperidol (HALDOL) oral concentrated solution   Yes No   Sig: Take 1 mg by mouth every 6 (six) hours as needed for agitation (nausea or vomiting)  CP on HOLD  Indications: Nausea and Vomiting, agitation   hyoscyamine (LEVSIN/SL) 0 125 mg SL tablet   Yes No   Sig: Take 0 125 mg by mouth every 4 (four) hours as needed (secretions)  CP on HOLD  Indications: secretions   loratadine (CLARITIN) 10 mg tablet   Yes No   Sig: Take by mouth   oxyCODONE (ROXICODONE) 5 immediate release tablet   Yes No   Sig: Take 5 mg by mouth every 6 (six) hours as needed (pain or SOB)  Indications: pain or sob   pantoprazole (PROTONIX) 20 mg tablet   Yes No   Sig: Take 20 mg by mouth daily  Exhaust patient supply then switch to formulary  Indications: Heartburn   prochlorperazine (COMPAZINE) 10 mg tablet   Yes No   Sig: Take 10 mg by mouth every 6 (six) hours as needed for nausea or vomiting  CP on HOLD  Indications: Nausea and Vomiting   senna (SENOKOT) 8 6 mg   Yes No   Sig: Take 8 6 mg by mouth daily at bedtime  Indications: Constipation   traZODone (DESYREL) 50 mg tablet   Yes No   Sig: Take 100 mg by mouth daily at bedtime  Indications: Trouble Sleeping   zinc oxide (BALMEX) 11 3 % cream   Yes No   Sig: Apply 1 application topically 3 (three) times a day as needed (skin irritation)  Indications: skin irritation      Facility-Administered Medications: None     Allergies   Allergen Reactions   • Bactrim [Sulfamethoxazole-Trimethoprim]    • Simvastatin Myalgia       Objective   First Vitals:   Blood Pressure: 165/98 (01/17/23 2244)  Pulse: 104 (01/17/23 2244)  Temperature: 97 5 °F (36 4 °C) (01/17/23 2244)  Respirations: 20 (01/17/23 2244)  SpO2: 100 % (01/17/23 2244)    Current Vitals:   Blood Pressure: 165/98 (01/17/23 2244)  Pulse: 104 (01/17/23 2244)  Temperature: 97 5 °F (36 4 °C) (01/17/23 2244)  Respirations: 20 (01/17/23 2244)  SpO2: 100 % (01/17/23 2244)    No intake or output data in the 24 hours ending 01/18/23 0120    Invasive Devices     None                 Physical Exam  Vitals and nursing note reviewed     Constitutional:       General: She is not in acute distress  Appearance: Normal appearance  She is well-developed  HENT:      Head: Normocephalic and atraumatic  Right Ear: External ear normal       Left Ear: External ear normal       Nose: Nose normal       Mouth/Throat:      Pharynx: No oropharyngeal exudate  Eyes:      Conjunctiva/sclera: Conjunctivae normal       Pupils: Pupils are equal, round, and reactive to light  Cardiovascular:      Rate and Rhythm: Normal rate and regular rhythm  Pulmonary:      Effort: Pulmonary effort is normal  No respiratory distress  Abdominal:      General: Abdomen is flat  There is no distension  Palpations: Abdomen is soft  Musculoskeletal:         General: No deformity  Normal range of motion  Cervical back: Normal range of motion and neck supple  Skin:     General: Skin is warm and dry  Capillary Refill: Capillary refill takes less than 2 seconds  Neurological:      General: No focal deficit present  Mental Status: She is alert  Mental status is at baseline  She is disoriented  Coordination: Coordination abnormal    Psychiatric:      Comments: Thought content, judgment, and decision-making capacity are impaired secondary to dementia  Medical Decision Makin  Fall with head strike: Head CT; CT scan C-spine  2   Chronic dementia and delirium  No results found for this or any previous visit (from the past 36 hour(s))  CT head without contrast   Final Result      No acute intracranial abnormality  Mild chronic small vessel ischemic changes  Workstation performed: [id number]         CT cervical spine without contrast   Final Result      No cervical spine fracture or traumatic malalignment  Workstation performed: [id number]               Portions of the record may have been created with voice recognition software   Occasional wrong word or "sound a like" substitutions may have occurred due to the inherent limitations of voice recognition software  Read the chart carefully and recognize, using context, where substitutions have occurred          Critical Care Time  Procedures

## 2023-01-19 ENCOUNTER — HOME CARE VISIT (OUTPATIENT)
Dept: HOME HEALTH SERVICES | Facility: HOME HEALTHCARE | Age: 83
End: 2023-01-19

## 2023-01-19 VITALS — HEART RATE: 82 BPM | DIASTOLIC BLOOD PRESSURE: 70 MMHG | RESPIRATION RATE: 17 BRPM | SYSTOLIC BLOOD PRESSURE: 140 MMHG

## 2023-01-20 ENCOUNTER — HOME CARE VISIT (OUTPATIENT)
Dept: HOME HOSPICE | Facility: HOSPICE | Age: 83
End: 2023-01-20

## 2023-01-24 ENCOUNTER — HOME CARE VISIT (OUTPATIENT)
Dept: HOME HEALTH SERVICES | Facility: HOME HEALTHCARE | Age: 83
End: 2023-01-24

## 2023-01-26 ENCOUNTER — HOME CARE VISIT (OUTPATIENT)
Dept: HOME HEALTH SERVICES | Facility: HOME HEALTHCARE | Age: 83
End: 2023-01-26

## 2023-01-26 VITALS — HEART RATE: 96 BPM | SYSTOLIC BLOOD PRESSURE: 110 MMHG | RESPIRATION RATE: 20 BRPM | DIASTOLIC BLOOD PRESSURE: 55 MMHG

## 2023-01-26 NOTE — ED PROVIDER NOTES
History  Chief Complaint   Patient presents with   • Fall     Pt fell forward hitting forehead, no thinners, no LOC  HPI    77-year-old female, past medical history significant for dementia, ambulatory dysfunction, presenting for evaluation after a fall  Criss Shank while attempting to walk to the commode this evening  Patient has baseline ambulatory dysfunction is not supposed to ambulate independently  Criss Shank forward striking her head  She is not on any anticoagulants or antiplatelets  Patient denies any symptoms at time of evaluation including but not limited to headache, neck pain, chest pain, abdominal pain, numbness, weakness  Prior to Admission Medications   Prescriptions Last Dose Informant Patient Reported? Taking? LORazepam (Ativan) 0 5 mg tablet   Yes No   Sig: Take 0 5 mg by mouth every 6 (six) hours as needed for anxiety (restlessness or SOB)  CP on HOLD  Indications: Feeling Anxious, restlessness or SOB   Morphine Sulfate, Concentrate, 20 mg/mL concentrated solution   Yes No   Sig: Take 5 mg by mouth every 3 (three) hours as needed (pain or SOB)  CP on HOLD  Indications: Difficulty Breathing, Pain   QUEtiapine (SEROquel) 25 mg tablet   Yes No   Sig: Take 25 mg by mouth daily at bedtime  Indications: Restless   acetaminophen (TYLENOL) 325 mg tablet   Yes No   Sig: Take 650 mg by mouth every 6 (six) hours as needed for fever or mild pain  Indications: Fever, Pain   bisacodyl (DULCOLAX) 10 mg suppository   Yes No   Sig: Insert 10 mg into the rectum daily as needed for constipation  CP on HOLD  Indications: Constipation   diphenoxylate-atropine (LOMOTIL) 2 5-0 025 mg per tablet   No No   Sig: TAKE 1 TABLET BY MOUTH 4 TIMES A DAY AS NEEDED FOR DIARRHEA    haloperidol (HALDOL) oral concentrated solution   Yes No   Sig: Take 1 mg by mouth every 6 (six) hours as needed for agitation (nausea or vomiting)   CP on HOLD  Indications: Nausea and Vomiting, agitation   hyoscyamine (LEVSIN/SL) 0 125 mg SL tablet Yes No   Sig: Take 0 125 mg by mouth every 4 (four) hours as needed (secretions)  CP on HOLD  Indications: secretions   loratadine (CLARITIN) 10 mg tablet   Yes No   Sig: Take by mouth   oxyCODONE (ROXICODONE) 5 immediate release tablet   Yes No   Sig: Take 5 mg by mouth every 6 (six) hours as needed (pain or SOB)  Indications: pain or sob   pantoprazole (PROTONIX) 20 mg tablet   Yes No   Sig: Take 20 mg by mouth daily  Exhaust patient supply then switch to formulary  Indications: Heartburn   prochlorperazine (COMPAZINE) 10 mg tablet   Yes No   Sig: Take 10 mg by mouth every 6 (six) hours as needed for nausea or vomiting  CP on HOLD  Indications: Nausea and Vomiting   senna (SENOKOT) 8 6 mg   Yes No   Sig: Take 8 6 mg by mouth daily at bedtime  Indications: Constipation   traZODone (DESYREL) 50 mg tablet   Yes No   Sig: Take 100 mg by mouth daily at bedtime  Indications: Trouble Sleeping   zinc oxide (BALMEX) 11 3 % cream   Yes No   Sig: Apply 1 application topically 3 (three) times a day as needed (skin irritation)  Indications: skin irritation      Facility-Administered Medications: None       Past Medical History:   Diagnosis Date   • Alzheimer's dementia ([facility] ) 12/21/2021   • Closed fracture of right proximal humerus 12/06/2021   • GIST (gastrointestinal stromal tumor), 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   • High cholesterol    • HTN (hypertension) 12/04/2020       Past Surgical History:   Procedure Laterality Date   • FL INJECTION LEFT HIP (NON ARTHROGRAM)  5/14/2019       Family History   Problem Relation Age of Onset   • Colon cancer Mother    • Lung cancer Mother    • Tuberculosis Father      I have reviewed and agree with the history as documented      E-Cigarette/Vaping   • E-Cigarette Use Never User      E-Cigarette/Vaping Substances   • Nicotine No    • THC No    • CBD No    • Flavoring No      Social History     Tobacco Use   • Smoking status: Never   • Smokeless tobacco: Never   Vaping Use   • Vaping Use: Never used   Substance Use Topics   • Alcohol use: Yes     Comment: occasional   • Drug use: Never        Review of Systems   Constitutional: Negative for chills and fever  HENT: Negative for sore throat  Respiratory: Negative for cough and shortness of breath  Cardiovascular: Negative for chest pain, palpitations and leg swelling  Gastrointestinal: Negative for abdominal pain, diarrhea, nausea and vomiting  Genitourinary: Negative for dysuria and frequency  Musculoskeletal: Negative for myalgias  Skin: Negative for rash and wound  Neurological: Negative for dizziness, weakness, light-headedness, numbness and headaches  All other systems reviewed and are negative  Physical Exam  ED Triage Vitals [01/17/23 2244]   Temperature Pulse Respirations Blood Pressure SpO2   97 5 °F (36 4 °C) 104 20 165/98 100 %      Temp src Heart Rate Source Patient Position - Orthostatic VS BP Location FiO2 (%)   -- -- -- -- --      Pain Score       --             Orthostatic Vital Signs  Vitals:    01/17/23 2244   BP: 165/98   Pulse: 104       Physical Exam  Vitals and nursing note reviewed  Constitutional:       General: She is not in acute distress  Appearance: Normal appearance  She is not ill-appearing or toxic-appearing  HENT:      Head: Normocephalic  Right Ear: External ear normal       Left Ear: External ear normal       Nose: Nose normal       Mouth/Throat:      Mouth: Mucous membranes are moist       Pharynx: Oropharynx is clear  Eyes:      General: No scleral icterus  Extraocular Movements: Extraocular movements intact  Cardiovascular:      Rate and Rhythm: Normal rate and regular rhythm  Pulses: Normal pulses  Pulmonary:      Effort: Pulmonary effort is normal  No respiratory distress  Breath sounds: Normal breath sounds  Abdominal:      Palpations: Abdomen is soft  Tenderness: There is no abdominal tenderness     Musculoskeletal:         General: Normal range of motion  Cervical back: Normal range of motion and neck supple  Skin:     General: Skin is warm  Capillary Refill: Capillary refill takes less than 2 seconds  Neurological:      General: No focal deficit present  Mental Status: She is alert  Comments: Oriented to person and place but not time or situation  ED Medications  Medications   acetaminophen (TYLENOL) tablet 650 mg (650 mg Oral Given 1/17/23 5526)       Diagnostic Studies  Results Reviewed     None                 CT head without contrast   Final Result by Tea Oquendo MD (01/18 0008)      No acute intracranial abnormality  Mild chronic small vessel ischemic changes  Workstation performed: [id number]         CT cervical spine without contrast   Final Result by Tea Oquendo MD (01/18 0014)      No cervical spine fracture or traumatic malalignment  Workstation performed: [id number]               Procedures  Procedures      ED Course               Identification of Seniors at [address] [place] [address] Most Recent Value   (ISAR) Identification of Seniors at Risk    Before the illness or injury that brought you to the Emergency, did you need someone to help you on a regular basis? 0 Filed at: 01/17/2023 2258   In the last 24 hours, have you needed more help than usual? 0 Filed at: 01/17/2023 2258   Have you been hospitalized for one or more nights during the past 6 months? --   In general, do you see well? 0 Filed at: 01/17/2023 2258   In general, do you have serious problems with your memory? 0 Filed at: 01/17/2023 2258   Do you take more than three different medications every day? --   ISAR Score 0 Filed at: 01/17/2023 2258                    SBIRT 20yo+    Flowsheet Row Most Recent Value   SBIRT (23 yo +)    In order to provide better care to our patients, we are screening all of our patients for alcohol and drug use  Would it be okay to ask you these screening questions?  No Filed at: 01/18/2023 0041                Medical Decision Making  80-year-old female, past medical history of dementia, amatory dysfunction, presenting for evaluation after a mechanical fall  She has no complaints but has a hematoma to her right forehead  Patient is not on anticoagulation or antiplatelet therapy  Remainder of exam is benign  Reviewed the patient's chart, patient is a hospice patient  I had a detailed discussion with the patient's daughter regarding goals of care, daughter would want an evaluation for traumatic injury and hospitalization if required  We will evaluate for intracranial hemorrhage, C-spine injury with CT head, CT C-spine  Independently interpreted and reviewed the patient's CT scans and agree with the radiologist interpretation  Discussed findings with patient and daughter, they are comfortable taking the patient home  They will call the patient's hospice provider in the morning to see if they can arrange for additional nursing assistance at home  I reviewed all testing with the patient: see above  I gave oral return precautions for what to return for in addition to the written return precautions  The patient (and any family present: daughter) verbalized understanding of the discharge instructions and warnings that would necessitate return to the Emergency Department  I specifically highlighted areas of special concern regarding the written and verbal discharge instructions and return precautions  All questions were answered prior to discharge  Ambulatory dysfunction: complicated acute illness or injury  Fall, initial encounter: complicated acute illness or injury  Injury of head, initial encounter: complicated acute illness or injury  Amount and/or Complexity of Data Reviewed  Radiology: ordered  Risk  OTC drugs              Disposition  Final diagnoses:   Fall, initial encounter   Injury of head, initial encounter   Ambulatory dysfunction     Time reflects when diagnosis was documented in both MDM as applicable and the Disposition within this note     Time User Action Codes Description Comment    1/18/2023  1:41 AM Nicko Powell Add [W19  YPDN] Fall, initial encounter     1/18/2023  1:41 AM Nicko Powell Add [A45 91EC] Injury of head, initial encounter     1/18/2023  1:41 AM Nicko Powell Add [R26 2] Ambulatory dysfunction       ED Disposition     ED Disposition   Discharge    Condition   Stable    Date/Time   Wed Jan 18, 2023  1:41 AM    Comment   Alexis Andrade discharge to home/self care  Follow-up Information     Follow up With Specialties Details Why Contact Info Additional Information    Nellie Shaw MD [facility] Medicine  For Emergency Department Follow-up [facility] [address] [facility] [place] [address]  [address] Emergency Department Emergency Medicine  If symptoms worsen [address] [id number]  [address] Emergency Department, [address], [address]          Discharge Medication List as of 1/18/2023  1:42 AM      CONTINUE these medications which have NOT CHANGED    Details   acetaminophen (TYLENOL) 325 mg tablet Take 650 mg by mouth every 6 (six) hours as needed for fever or mild pain  Indications: Fever, Pain, Starting Fri 12/16/2022, Historical Med      bisacodyl (DULCOLAX) 10 mg suppository Insert 10 mg into the rectum daily as needed for constipation  CP on HOLD  Indications: Constipation, Historical Med      diphenoxylate-atropine (LOMOTIL) 2 5-0 025 mg per tablet TAKE 1 TABLET BY MOUTH 4 TIMES A DAY AS NEEDED FOR DIARRHEA , Normal      haloperidol (HALDOL) oral concentrated solution Take 1 mg by mouth every 6 (six) hours as needed for agitation (nausea or vomiting)   CP on HOLD  Indications: Nausea and Vomiting, agitation, Historical Med      hyoscyamine (LEVSIN/SL) 0 125 mg SL tablet Take 0 125 mg by mouth every 4 (four) hours as needed (secretions)  CP on HOLD  Indications: secretions, Historical Med      loratadine (CLARITIN) 10 mg tablet Take by mouth, Starting Wed 10/8/2014, Historical Med      LORazepam (Ativan) 0 5 mg tablet Take 0 5 mg by mouth every 6 (six) hours as needed for anxiety (restlessness or SOB)  CP on HOLD  Indications: Feeling Anxious, restlessness or SOB, Historical Med      Morphine Sulfate, Concentrate, 20 mg/mL concentrated solution Take 5 mg by mouth every 3 (three) hours as needed (pain or SOB)  CP on HOLD  Indications: Difficulty Breathing, Pain, Historical Med      oxyCODONE (ROXICODONE) 5 immediate release tablet Take 5 mg by mouth every 6 (six) hours as needed (pain or SOB)  Indications: pain or sob, Starting Fri 12/16/2022, Historical Med      pantoprazole (PROTONIX) 20 mg tablet Take 20 mg by mouth daily  Exhaust patient supply then switch to formulary  Indications: Heartburn, Starting Fri 12/16/2022, Historical Med      prochlorperazine (COMPAZINE) 10 mg tablet Take 10 mg by mouth every 6 (six) hours as needed for nausea or vomiting  CP on HOLD  Indications: Nausea and Vomiting, Historical Med      QUEtiapine (SEROquel) 25 mg tablet Take 25 mg by mouth daily at bedtime  Indications: Restless, Starting Thu 1/5/2023, Historical Med      senna (SENOKOT) 8 6 mg Take 8 6 mg by mouth daily at bedtime  Indications: Constipation, Starting Thu 1/12/2023, Historical Med      traZODone (DESYREL) 50 mg tablet Take 100 mg by mouth daily at bedtime  Indications: Trouble Sleeping, Starting Thu 1/5/2023, Historical Med      zinc oxide (BALMEX) 11 3 % cream Apply 1 application topically 3 (three) times a day as needed (skin irritation)  Indications: skin irritation, Starting Fri 12/16/2022, Historical Med           No discharge procedures on file      PDMP Review       Value Time User    PDMP Reviewed  Yes 9/26/2022 10:31 AM Tiarra Seaman MD           ED Provider  Attending physically available and estuardo Anderson I managed the patient along with the ED Attending      Electronically Signed by         Abisai Ramachandran DO  01/26/23 5203

## 2023-01-31 ENCOUNTER — HOME CARE VISIT (OUTPATIENT)
Dept: HOME HOSPICE | Facility: HOSPICE | Age: 83
End: 2023-01-31

## 2023-01-31 ENCOUNTER — HOME CARE VISIT (OUTPATIENT)
Dept: HOME HEALTH SERVICES | Facility: HOME HEALTHCARE | Age: 83
End: 2023-01-31

## 2023-02-02 ENCOUNTER — HOME CARE VISIT (OUTPATIENT)
Dept: HOME HEALTH SERVICES | Facility: HOME HEALTHCARE | Age: 83
End: 2023-02-02

## 2023-02-21 ENCOUNTER — HOME CARE VISIT (OUTPATIENT)
Dept: HOME HOSPICE | Facility: HOSPICE | Age: 83
End: 2023-02-21

## 2023-03-03 ENCOUNTER — HOME CARE VISIT (OUTPATIENT)
Dept: HOME HOSPICE | Facility: HOSPICE | Age: 83
End: 2023-03-03

## 2025-04-04 NOTE — ASSESSMENT & PLAN NOTE
· Suspect oozing from gastric mass (mild mucosal oozing noted on EGD without evidence of a lesion)  · Hemoglobin remains stable  · Monitor closely  · Supportive care with blood transfusions as needed  · GI following, unfortunately there is not a great option for endoscopic hemostasis  · Continue PPI  · No sign of active bleeding at this time  · Advance diet No